# Patient Record
Sex: MALE | Race: WHITE | NOT HISPANIC OR LATINO | Employment: OTHER | URBAN - METROPOLITAN AREA
[De-identification: names, ages, dates, MRNs, and addresses within clinical notes are randomized per-mention and may not be internally consistent; named-entity substitution may affect disease eponyms.]

---

## 2020-08-15 ENCOUNTER — COMMUNICATION - HEALTHEAST (OUTPATIENT)
Dept: SCHEDULING | Facility: CLINIC | Age: 75
End: 2020-08-15

## 2020-10-13 ENCOUNTER — COMMUNICATION - HEALTHEAST (OUTPATIENT)
Dept: SCHEDULING | Facility: CLINIC | Age: 75
End: 2020-10-13

## 2021-01-31 ENCOUNTER — IMMUNIZATION (OUTPATIENT)
Dept: NURSING | Facility: CLINIC | Age: 76
End: 2021-01-31
Payer: COMMERCIAL

## 2021-01-31 PROCEDURE — 91300 PR COVID VAC PFIZER DIL RECON 30 MCG/0.3 ML IM: CPT

## 2021-01-31 PROCEDURE — 0001A PR COVID VAC PFIZER DIL RECON 30 MCG/0.3 ML IM: CPT

## 2021-02-21 ENCOUNTER — IMMUNIZATION (OUTPATIENT)
Dept: NURSING | Facility: CLINIC | Age: 76
End: 2021-02-21
Attending: FAMILY MEDICINE
Payer: COMMERCIAL

## 2021-02-21 PROCEDURE — 91300 PR COVID VAC PFIZER DIL RECON 30 MCG/0.3 ML IM: CPT

## 2021-02-21 PROCEDURE — 0002A PR COVID VAC PFIZER DIL RECON 30 MCG/0.3 ML IM: CPT

## 2021-03-07 ENCOUNTER — HEALTH MAINTENANCE LETTER (OUTPATIENT)
Age: 76
End: 2021-03-07

## 2021-06-16 PROBLEM — I10 HTN (HYPERTENSION): Status: ACTIVE | Noted: 2020-02-06

## 2021-06-16 PROBLEM — R09.02 HYPOXIA: Status: ACTIVE | Noted: 2020-08-13

## 2021-06-16 PROBLEM — F41.9 ANXIETY: Status: ACTIVE | Noted: 2020-02-06

## 2021-06-16 PROBLEM — E78.5 HYPERLIPIDEMIA: Status: ACTIVE | Noted: 2020-02-06

## 2021-06-16 PROBLEM — G47.9 SLEEP DISORDER: Status: ACTIVE | Noted: 2020-02-06

## 2021-10-11 ENCOUNTER — HEALTH MAINTENANCE LETTER (OUTPATIENT)
Age: 76
End: 2021-10-11

## 2022-03-27 ENCOUNTER — HEALTH MAINTENANCE LETTER (OUTPATIENT)
Age: 77
End: 2022-03-27

## 2022-09-02 ENCOUNTER — HOSPITAL ENCOUNTER (OUTPATIENT)
Facility: HOSPITAL | Age: 77
End: 2022-09-02
Attending: SURGERY | Admitting: SURGERY
Payer: COMMERCIAL

## 2022-09-02 ENCOUNTER — APPOINTMENT (OUTPATIENT)
Dept: ULTRASOUND IMAGING | Facility: HOSPITAL | Age: 77
DRG: 342 | End: 2022-09-02
Attending: FAMILY MEDICINE
Payer: COMMERCIAL

## 2022-09-02 ENCOUNTER — ANESTHESIA (OUTPATIENT)
Dept: SURGERY | Facility: HOSPITAL | Age: 77
DRG: 342 | End: 2022-09-02
Payer: COMMERCIAL

## 2022-09-02 ENCOUNTER — ANESTHESIA EVENT (OUTPATIENT)
Dept: SURGERY | Facility: HOSPITAL | Age: 77
DRG: 342 | End: 2022-09-02
Payer: COMMERCIAL

## 2022-09-02 ENCOUNTER — HOSPITAL ENCOUNTER (INPATIENT)
Facility: HOSPITAL | Age: 77
LOS: 1 days | Discharge: HOME | DRG: 342 | End: 2022-09-03
Attending: FAMILY MEDICINE | Admitting: SURGERY
Payer: COMMERCIAL

## 2022-09-02 ENCOUNTER — APPOINTMENT (OUTPATIENT)
Dept: RADIOLOGY | Facility: HOSPITAL | Age: 77
DRG: 342 | End: 2022-09-02
Attending: FAMILY MEDICINE
Payer: COMMERCIAL

## 2022-09-02 ENCOUNTER — APPOINTMENT (OUTPATIENT)
Dept: CT IMAGING | Facility: HOSPITAL | Age: 77
DRG: 342 | End: 2022-09-02
Attending: FAMILY MEDICINE
Payer: COMMERCIAL

## 2022-09-02 DIAGNOSIS — K35.30 ACUTE APPENDICITIS WITH LOCALIZED PERITONITIS, WITHOUT PERFORATION, ABSCESS, OR GANGRENE: ICD-10-CM

## 2022-09-02 DIAGNOSIS — I10 HYPERTENSION, UNSPECIFIED TYPE: Primary | ICD-10-CM

## 2022-09-02 DIAGNOSIS — R91.8 PULMONARY NODULES: ICD-10-CM

## 2022-09-02 PROBLEM — Z86.39 HISTORY OF DIABETES MELLITUS: Status: ACTIVE | Noted: 2022-09-02

## 2022-09-02 PROBLEM — E87.20 LACTIC ACIDEMIA: Status: ACTIVE | Noted: 2022-09-02

## 2022-09-02 PROBLEM — E87.1 HYPONATREMIA: Status: ACTIVE | Noted: 2022-09-02

## 2022-09-02 LAB
ALBUMIN SERPL-MCNC: 3.9 G/DL (ref 3.5–5)
ALBUMIN UR-MCNC: 20 MG/DL
ALP SERPL-CCNC: 49 U/L (ref 45–120)
ALT SERPL W P-5'-P-CCNC: 24 U/L (ref 0–45)
AMORPH CRY #/AREA URNS HPF: ABNORMAL /HPF
ANION GAP SERPL CALCULATED.3IONS-SCNC: 8 MMOL/L (ref 5–18)
APPEARANCE UR: CLEAR
AST SERPL W P-5'-P-CCNC: 21 U/L (ref 0–40)
BASOPHILS # BLD AUTO: 0.1 10E3/UL (ref 0–0.2)
BASOPHILS NFR BLD AUTO: 0 %
BILIRUB DIRECT SERPL-MCNC: 0.3 MG/DL
BILIRUB SERPL-MCNC: 1 MG/DL (ref 0–1)
BILIRUB UR QL STRIP: NEGATIVE
BUN SERPL-MCNC: 13 MG/DL (ref 8–28)
CALCIUM SERPL-MCNC: 10.6 MG/DL (ref 8.5–10.5)
CHLORIDE BLD-SCNC: 95 MMOL/L (ref 98–107)
CO2 SERPL-SCNC: 26 MMOL/L (ref 22–31)
COLOR UR AUTO: ABNORMAL
CREAT BLD-MCNC: 0.7 MG/DL (ref 0.7–1.3)
CREAT SERPL-MCNC: 0.9 MG/DL (ref 0.7–1.3)
EOSINOPHIL # BLD AUTO: 0 10E3/UL (ref 0–0.7)
EOSINOPHIL NFR BLD AUTO: 0 %
ERYTHROCYTE [DISTWIDTH] IN BLOOD BY AUTOMATED COUNT: 13.6 % (ref 10–15)
GFR SERPL CREATININE-BSD FRML MDRD: 87 ML/MIN/1.73M2
GFR SERPL CREATININE-BSD FRML MDRD: >60 ML/MIN/1.73M2
GLUCOSE BLD-MCNC: 154 MG/DL (ref 70–125)
GLUCOSE BLDC GLUCOMTR-MCNC: 160 MG/DL (ref 70–99)
GLUCOSE BLDC GLUCOMTR-MCNC: 190 MG/DL (ref 70–99)
GLUCOSE UR STRIP-MCNC: NEGATIVE MG/DL
HCT VFR BLD AUTO: 46.1 % (ref 40–53)
HGB BLD-MCNC: 15.8 G/DL (ref 13.3–17.7)
HGB UR QL STRIP: ABNORMAL
HOLD SPECIMEN: NORMAL
HOLD SPECIMEN: NORMAL
IMM GRANULOCYTES # BLD: 0.1 10E3/UL
IMM GRANULOCYTES NFR BLD: 1 %
KETONES UR STRIP-MCNC: NEGATIVE MG/DL
LACTATE SERPL-SCNC: 2.5 MMOL/L (ref 0.7–2)
LACTATE SERPL-SCNC: 2.5 MMOL/L (ref 0.7–2)
LEUKOCYTE ESTERASE UR QL STRIP: NEGATIVE
LIPASE SERPL-CCNC: 19 U/L (ref 0–52)
LYMPHOCYTES # BLD AUTO: 0.6 10E3/UL (ref 0.8–5.3)
LYMPHOCYTES NFR BLD AUTO: 4 %
MCH RBC QN AUTO: 30.4 PG (ref 26.5–33)
MCHC RBC AUTO-ENTMCNC: 34.3 G/DL (ref 31.5–36.5)
MCV RBC AUTO: 89 FL (ref 78–100)
MONOCYTES # BLD AUTO: 0.8 10E3/UL (ref 0–1.3)
MONOCYTES NFR BLD AUTO: 5 %
MUCOUS THREADS #/AREA URNS LPF: PRESENT /LPF
NEUTROPHILS # BLD AUTO: 15.2 10E3/UL (ref 1.6–8.3)
NEUTROPHILS NFR BLD AUTO: 90 %
NITRATE UR QL: NEGATIVE
NRBC # BLD AUTO: 0 10E3/UL
NRBC BLD AUTO-RTO: 0 /100
PH UR STRIP: 7 [PH] (ref 5–7)
PLATELET # BLD AUTO: 121 10E3/UL (ref 150–450)
PLATELET # BLD AUTO: 186 10E3/UL (ref 150–450)
POTASSIUM BLD-SCNC: 4.3 MMOL/L (ref 3.5–5)
PROT SERPL-MCNC: 7.2 G/DL (ref 6–8)
RBC # BLD AUTO: 5.2 10E6/UL (ref 4.4–5.9)
RBC URINE: 3 /HPF
SARS-COV-2 RNA RESP QL NAA+PROBE: NEGATIVE
SODIUM SERPL-SCNC: 129 MMOL/L (ref 136–145)
SP GR UR STRIP: 1.02 (ref 1–1.03)
UROBILINOGEN UR STRIP-MCNC: <2 MG/DL
WBC # BLD AUTO: 16.7 10E3/UL (ref 4–11)
WBC URINE: 1 /HPF

## 2022-09-02 PROCEDURE — 272N000001 HC OR GENERAL SUPPLY STERILE: Performed by: SURGERY

## 2022-09-02 PROCEDURE — 710N000009 HC RECOVERY PHASE 1, LEVEL 1, PER MIN: Performed by: SURGERY

## 2022-09-02 PROCEDURE — 250N000013 HC RX MED GY IP 250 OP 250 PS 637: Performed by: SURGERY

## 2022-09-02 PROCEDURE — 258N000003 HC RX IP 258 OP 636: Performed by: ANESTHESIOLOGY

## 2022-09-02 PROCEDURE — 83605 ASSAY OF LACTIC ACID: CPT | Performed by: FAMILY MEDICINE

## 2022-09-02 PROCEDURE — 96374 THER/PROPH/DIAG INJ IV PUSH: CPT | Mod: 59

## 2022-09-02 PROCEDURE — 120N000001 HC R&B MED SURG/OB

## 2022-09-02 PROCEDURE — 258N000003 HC RX IP 258 OP 636: Performed by: FAMILY MEDICINE

## 2022-09-02 PROCEDURE — 250N000009 HC RX 250: Performed by: NURSE ANESTHETIST, CERTIFIED REGISTERED

## 2022-09-02 PROCEDURE — 76705 ECHO EXAM OF ABDOMEN: CPT

## 2022-09-02 PROCEDURE — 83605 ASSAY OF LACTIC ACID: CPT | Performed by: INTERNAL MEDICINE

## 2022-09-02 PROCEDURE — 74177 CT ABD & PELVIS W/CONTRAST: CPT

## 2022-09-02 PROCEDURE — 99285 EMERGENCY DEPT VISIT HI MDM: CPT | Mod: 25

## 2022-09-02 PROCEDURE — 85049 AUTOMATED PLATELET COUNT: CPT | Performed by: SURGERY

## 2022-09-02 PROCEDURE — 250N000011 HC RX IP 250 OP 636: Performed by: SURGERY

## 2022-09-02 PROCEDURE — 82248 BILIRUBIN DIRECT: CPT | Performed by: FAMILY MEDICINE

## 2022-09-02 PROCEDURE — 250N000011 HC RX IP 250 OP 636: Performed by: NURSE ANESTHETIST, CERTIFIED REGISTERED

## 2022-09-02 PROCEDURE — 36415 COLL VENOUS BLD VENIPUNCTURE: CPT | Performed by: FAMILY MEDICINE

## 2022-09-02 PROCEDURE — 88304 TISSUE EXAM BY PATHOLOGIST: CPT | Mod: TC | Performed by: SURGERY

## 2022-09-02 PROCEDURE — 999N000141 HC STATISTIC PRE-PROCEDURE NURSING ASSESSMENT: Performed by: SURGERY

## 2022-09-02 PROCEDURE — C9803 HOPD COVID-19 SPEC COLLECT: HCPCS

## 2022-09-02 PROCEDURE — 93005 ELECTROCARDIOGRAM TRACING: CPT | Performed by: FAMILY MEDICINE

## 2022-09-02 PROCEDURE — 81001 URINALYSIS AUTO W/SCOPE: CPT | Performed by: FAMILY MEDICINE

## 2022-09-02 PROCEDURE — 85025 COMPLETE CBC W/AUTO DIFF WBC: CPT | Performed by: FAMILY MEDICINE

## 2022-09-02 PROCEDURE — 250N000013 HC RX MED GY IP 250 OP 250 PS 637: Performed by: INTERNAL MEDICINE

## 2022-09-02 PROCEDURE — 0DTJ4ZZ RESECTION OF APPENDIX, PERCUTANEOUS ENDOSCOPIC APPROACH: ICD-10-PCS | Performed by: SURGERY

## 2022-09-02 PROCEDURE — G0378 HOSPITAL OBSERVATION PER HR: HCPCS

## 2022-09-02 PROCEDURE — 250N000012 HC RX MED GY IP 250 OP 636 PS 637: Performed by: INTERNAL MEDICINE

## 2022-09-02 PROCEDURE — 82565 ASSAY OF CREATININE: CPT

## 2022-09-02 PROCEDURE — 250N000025 HC SEVOFLURANE, PER MIN: Performed by: SURGERY

## 2022-09-02 PROCEDURE — 258N000003 HC RX IP 258 OP 636: Performed by: NURSE ANESTHETIST, CERTIFIED REGISTERED

## 2022-09-02 PROCEDURE — 360N000076 HC SURGERY LEVEL 3, PER MIN: Performed by: SURGERY

## 2022-09-02 PROCEDURE — 82310 ASSAY OF CALCIUM: CPT | Performed by: FAMILY MEDICINE

## 2022-09-02 PROCEDURE — 250N000011 HC RX IP 250 OP 636: Performed by: FAMILY MEDICINE

## 2022-09-02 PROCEDURE — 250N000009 HC RX 250: Performed by: SURGERY

## 2022-09-02 PROCEDURE — U0003 INFECTIOUS AGENT DETECTION BY NUCLEIC ACID (DNA OR RNA); SEVERE ACUTE RESPIRATORY SYNDROME CORONAVIRUS 2 (SARS-COV-2) (CORONAVIRUS DISEASE [COVID-19]), AMPLIFIED PROBE TECHNIQUE, MAKING USE OF HIGH THROUGHPUT TECHNOLOGIES AS DESCRIBED BY CMS-2020-01-R: HCPCS | Performed by: FAMILY MEDICINE

## 2022-09-02 PROCEDURE — 36415 COLL VENOUS BLD VENIPUNCTURE: CPT | Performed by: SURGERY

## 2022-09-02 PROCEDURE — 258N000003 HC RX IP 258 OP 636: Performed by: INTERNAL MEDICINE

## 2022-09-02 PROCEDURE — 99222 1ST HOSP IP/OBS MODERATE 55: CPT | Mod: 57 | Performed by: SURGERY

## 2022-09-02 PROCEDURE — 370N000017 HC ANESTHESIA TECHNICAL FEE, PER MIN: Performed by: SURGERY

## 2022-09-02 PROCEDURE — 99233 SBSQ HOSP IP/OBS HIGH 50: CPT | Performed by: INTERNAL MEDICINE

## 2022-09-02 PROCEDURE — 44970 LAPAROSCOPY APPENDECTOMY: CPT | Performed by: SURGERY

## 2022-09-02 PROCEDURE — 71045 X-RAY EXAM CHEST 1 VIEW: CPT

## 2022-09-02 PROCEDURE — 83690 ASSAY OF LIPASE: CPT | Performed by: FAMILY MEDICINE

## 2022-09-02 RX ORDER — ONDANSETRON 2 MG/ML
INJECTION INTRAMUSCULAR; INTRAVENOUS PRN
Status: DISCONTINUED | OUTPATIENT
Start: 2022-09-02 | End: 2022-09-02

## 2022-09-02 RX ORDER — HEPARIN SODIUM 5000 [USP'U]/.5ML
5000 INJECTION, SOLUTION INTRAVENOUS; SUBCUTANEOUS EVERY 8 HOURS
Status: DISCONTINUED | OUTPATIENT
Start: 2022-09-03 | End: 2022-09-03 | Stop reason: HOSPADM

## 2022-09-02 RX ORDER — ASPIRIN/SOD BICARB/CITRIC ACID 324 MG
1 TABLET, EFFERVESCENT ORAL 2 TIMES DAILY PRN
Status: ON HOLD | COMMUNITY
End: 2023-06-21

## 2022-09-02 RX ORDER — ONDANSETRON 4 MG/1
4 TABLET, ORALLY DISINTEGRATING ORAL EVERY 30 MIN PRN
Status: DISCONTINUED | OUTPATIENT
Start: 2022-09-02 | End: 2022-09-02 | Stop reason: HOSPADM

## 2022-09-02 RX ORDER — DEXAMETHASONE SODIUM PHOSPHATE 4 MG/ML
INJECTION, SOLUTION INTRA-ARTICULAR; INTRALESIONAL; INTRAMUSCULAR; INTRAVENOUS; SOFT TISSUE PRN
Status: DISCONTINUED | OUTPATIENT
Start: 2022-09-02 | End: 2022-09-02

## 2022-09-02 RX ORDER — PIPERACILLIN SODIUM, TAZOBACTAM SODIUM 3; .375 G/15ML; G/15ML
3.38 INJECTION, POWDER, LYOPHILIZED, FOR SOLUTION INTRAVENOUS ONCE
Status: COMPLETED | OUTPATIENT
Start: 2022-09-02 | End: 2022-09-02

## 2022-09-02 RX ORDER — ACETAMINOPHEN 325 MG/1
975 TABLET ORAL ONCE
Status: COMPLETED | OUTPATIENT
Start: 2022-09-02 | End: 2022-09-02

## 2022-09-02 RX ORDER — IOPAMIDOL 755 MG/ML
100 INJECTION, SOLUTION INTRAVASCULAR ONCE
Status: COMPLETED | OUTPATIENT
Start: 2022-09-02 | End: 2022-09-02

## 2022-09-02 RX ORDER — FENTANYL CITRATE 50 UG/ML
25 INJECTION, SOLUTION INTRAMUSCULAR; INTRAVENOUS EVERY 5 MIN PRN
Status: DISCONTINUED | OUTPATIENT
Start: 2022-09-02 | End: 2022-09-02 | Stop reason: HOSPADM

## 2022-09-02 RX ORDER — POLYETHYLENE GLYCOL 3350 17 G/17G
17 POWDER, FOR SOLUTION ORAL DAILY
Status: DISCONTINUED | OUTPATIENT
Start: 2022-09-03 | End: 2022-09-03 | Stop reason: HOSPADM

## 2022-09-02 RX ORDER — CALCIUM CARBONATE 500 MG/1
500 TABLET, CHEWABLE ORAL 3 TIMES DAILY PRN
Status: DISCONTINUED | OUTPATIENT
Start: 2022-09-02 | End: 2022-09-03 | Stop reason: HOSPADM

## 2022-09-02 RX ORDER — CEFAZOLIN SODIUM/WATER 2 G/20 ML
2 SYRINGE (ML) INTRAVENOUS
Status: DISCONTINUED | OUTPATIENT
Start: 2022-09-02 | End: 2022-09-02 | Stop reason: HOSPADM

## 2022-09-02 RX ORDER — SODIUM CHLORIDE 9 MG/ML
INJECTION, SOLUTION INTRAVENOUS CONTINUOUS
Status: DISCONTINUED | OUTPATIENT
Start: 2022-09-02 | End: 2022-09-03

## 2022-09-02 RX ORDER — HYDRALAZINE HYDROCHLORIDE 20 MG/ML
10 INJECTION INTRAMUSCULAR; INTRAVENOUS EVERY 4 HOURS PRN
Status: DISCONTINUED | OUTPATIENT
Start: 2022-09-02 | End: 2022-09-03 | Stop reason: HOSPADM

## 2022-09-02 RX ORDER — FENTANYL CITRATE 50 UG/ML
25 INJECTION, SOLUTION INTRAMUSCULAR; INTRAVENOUS
Status: CANCELLED | OUTPATIENT
Start: 2022-09-02

## 2022-09-02 RX ORDER — BISACODYL 10 MG
10 SUPPOSITORY, RECTAL RECTAL DAILY PRN
Status: DISCONTINUED | OUTPATIENT
Start: 2022-09-02 | End: 2022-09-03 | Stop reason: HOSPADM

## 2022-09-02 RX ORDER — HYDROMORPHONE HYDROCHLORIDE 1 MG/ML
0.2 INJECTION, SOLUTION INTRAMUSCULAR; INTRAVENOUS; SUBCUTANEOUS EVERY 5 MIN PRN
Status: DISCONTINUED | OUTPATIENT
Start: 2022-09-02 | End: 2022-09-02 | Stop reason: HOSPADM

## 2022-09-02 RX ORDER — MEPERIDINE HYDROCHLORIDE 25 MG/ML
12.5 INJECTION INTRAMUSCULAR; INTRAVENOUS; SUBCUTANEOUS
Status: DISCONTINUED | OUTPATIENT
Start: 2022-09-02 | End: 2022-09-02 | Stop reason: HOSPADM

## 2022-09-02 RX ORDER — PIPERACILLIN SODIUM, TAZOBACTAM SODIUM 3; .375 G/15ML; G/15ML
3.38 INJECTION, POWDER, LYOPHILIZED, FOR SOLUTION INTRAVENOUS EVERY 8 HOURS
Status: DISCONTINUED | OUTPATIENT
Start: 2022-09-02 | End: 2022-09-03 | Stop reason: HOSPADM

## 2022-09-02 RX ORDER — ACETAMINOPHEN 325 MG/1
650 TABLET ORAL EVERY 4 HOURS PRN
Status: DISCONTINUED | OUTPATIENT
Start: 2022-09-05 | End: 2022-09-03 | Stop reason: HOSPADM

## 2022-09-02 RX ORDER — SODIUM CHLORIDE, SODIUM LACTATE, POTASSIUM CHLORIDE, CALCIUM CHLORIDE 600; 310; 30; 20 MG/100ML; MG/100ML; MG/100ML; MG/100ML
INJECTION, SOLUTION INTRAVENOUS CONTINUOUS
Status: DISCONTINUED | OUTPATIENT
Start: 2022-09-02 | End: 2022-09-02 | Stop reason: HOSPADM

## 2022-09-02 RX ORDER — NALOXONE HYDROCHLORIDE 0.4 MG/ML
0.2 INJECTION, SOLUTION INTRAMUSCULAR; INTRAVENOUS; SUBCUTANEOUS
Status: DISCONTINUED | OUTPATIENT
Start: 2022-09-02 | End: 2022-09-03 | Stop reason: HOSPADM

## 2022-09-02 RX ORDER — LANOLIN ALCOHOL/MO/W.PET/CERES
3 CREAM (GRAM) TOPICAL
Status: DISCONTINUED | OUTPATIENT
Start: 2022-09-02 | End: 2022-09-03 | Stop reason: HOSPADM

## 2022-09-02 RX ORDER — ONDANSETRON 2 MG/ML
4 INJECTION INTRAMUSCULAR; INTRAVENOUS EVERY 6 HOURS PRN
Status: DISCONTINUED | OUTPATIENT
Start: 2022-09-02 | End: 2022-09-03 | Stop reason: HOSPADM

## 2022-09-02 RX ORDER — NALOXONE HYDROCHLORIDE 0.4 MG/ML
0.4 INJECTION, SOLUTION INTRAMUSCULAR; INTRAVENOUS; SUBCUTANEOUS
Status: DISCONTINUED | OUTPATIENT
Start: 2022-09-02 | End: 2022-09-03 | Stop reason: HOSPADM

## 2022-09-02 RX ORDER — DIPHENHYDRAMINE HYDROCHLORIDE 50 MG/ML
12.5 INJECTION INTRAMUSCULAR; INTRAVENOUS EVERY 6 HOURS PRN
Status: DISCONTINUED | OUTPATIENT
Start: 2022-09-02 | End: 2022-09-03 | Stop reason: HOSPADM

## 2022-09-02 RX ORDER — DEXTROSE MONOHYDRATE 25 G/50ML
25-50 INJECTION, SOLUTION INTRAVENOUS
Status: DISCONTINUED | OUTPATIENT
Start: 2022-09-02 | End: 2022-09-03 | Stop reason: HOSPADM

## 2022-09-02 RX ORDER — BUPIVACAINE HYDROCHLORIDE AND EPINEPHRINE 2.5; 5 MG/ML; UG/ML
INJECTION, SOLUTION INFILTRATION; PERINEURAL PRN
Status: DISCONTINUED | OUTPATIENT
Start: 2022-09-02 | End: 2022-09-02 | Stop reason: HOSPADM

## 2022-09-02 RX ORDER — DIPHENHYDRAMINE HCL 12.5MG/5ML
12.5 LIQUID (ML) ORAL EVERY 6 HOURS PRN
Status: DISCONTINUED | OUTPATIENT
Start: 2022-09-02 | End: 2022-09-03 | Stop reason: HOSPADM

## 2022-09-02 RX ORDER — OXYCODONE HYDROCHLORIDE 5 MG/1
5 TABLET ORAL EVERY 4 HOURS PRN
Status: DISCONTINUED | OUTPATIENT
Start: 2022-09-02 | End: 2022-09-02 | Stop reason: HOSPADM

## 2022-09-02 RX ORDER — FAMOTIDINE 20 MG/1
20 TABLET, FILM COATED ORAL EVERY 12 HOURS
Status: DISCONTINUED | OUTPATIENT
Start: 2022-09-02 | End: 2022-09-03 | Stop reason: HOSPADM

## 2022-09-02 RX ORDER — LIDOCAINE HYDROCHLORIDE 10 MG/ML
INJECTION, SOLUTION INFILTRATION; PERINEURAL PRN
Status: DISCONTINUED | OUTPATIENT
Start: 2022-09-02 | End: 2022-09-02

## 2022-09-02 RX ORDER — NICOTINE POLACRILEX 4 MG
15-30 LOZENGE BUCCAL
Status: DISCONTINUED | OUTPATIENT
Start: 2022-09-02 | End: 2022-09-03 | Stop reason: HOSPADM

## 2022-09-02 RX ORDER — ONDANSETRON 2 MG/ML
4 INJECTION INTRAMUSCULAR; INTRAVENOUS EVERY 30 MIN PRN
Status: DISCONTINUED | OUTPATIENT
Start: 2022-09-02 | End: 2022-09-02 | Stop reason: HOSPADM

## 2022-09-02 RX ORDER — ONDANSETRON 4 MG/1
4 TABLET, ORALLY DISINTEGRATING ORAL EVERY 6 HOURS PRN
Status: DISCONTINUED | OUTPATIENT
Start: 2022-09-02 | End: 2022-09-03 | Stop reason: HOSPADM

## 2022-09-02 RX ORDER — HYDROMORPHONE HYDROCHLORIDE 1 MG/ML
.5-1 INJECTION, SOLUTION INTRAMUSCULAR; INTRAVENOUS; SUBCUTANEOUS
Status: DISCONTINUED | OUTPATIENT
Start: 2022-09-02 | End: 2022-09-03 | Stop reason: HOSPADM

## 2022-09-02 RX ORDER — DEXTROSE MONOHYDRATE, SODIUM CHLORIDE, AND POTASSIUM CHLORIDE 50; 1.49; 4.5 G/1000ML; G/1000ML; G/1000ML
INJECTION, SOLUTION INTRAVENOUS CONTINUOUS
Status: DISCONTINUED | OUTPATIENT
Start: 2022-09-02 | End: 2022-09-02

## 2022-09-02 RX ORDER — TRAZODONE HYDROCHLORIDE 50 MG/1
50 TABLET, FILM COATED ORAL AT BEDTIME
Status: DISCONTINUED | OUTPATIENT
Start: 2022-09-02 | End: 2022-09-03 | Stop reason: HOSPADM

## 2022-09-02 RX ORDER — TRAMADOL HYDROCHLORIDE 50 MG/1
50 TABLET ORAL EVERY 6 HOURS PRN
Status: DISCONTINUED | OUTPATIENT
Start: 2022-09-02 | End: 2022-09-03 | Stop reason: HOSPADM

## 2022-09-02 RX ORDER — FENTANYL CITRATE 50 UG/ML
INJECTION, SOLUTION INTRAMUSCULAR; INTRAVENOUS PRN
Status: DISCONTINUED | OUTPATIENT
Start: 2022-09-02 | End: 2022-09-02

## 2022-09-02 RX ORDER — PROPOFOL 10 MG/ML
INJECTION, EMULSION INTRAVENOUS PRN
Status: DISCONTINUED | OUTPATIENT
Start: 2022-09-02 | End: 2022-09-02

## 2022-09-02 RX ORDER — HYDROMORPHONE HCL IN WATER/PF 6 MG/30 ML
0.4 PATIENT CONTROLLED ANALGESIA SYRINGE INTRAVENOUS
Status: DISCONTINUED | OUTPATIENT
Start: 2022-09-02 | End: 2022-09-03 | Stop reason: HOSPADM

## 2022-09-02 RX ORDER — ACETAMINOPHEN 325 MG/1
975 TABLET ORAL EVERY 8 HOURS
Status: DISCONTINUED | OUTPATIENT
Start: 2022-09-02 | End: 2022-09-03 | Stop reason: HOSPADM

## 2022-09-02 RX ORDER — LIDOCAINE 40 MG/G
CREAM TOPICAL
Status: DISCONTINUED | OUTPATIENT
Start: 2022-09-02 | End: 2022-09-02 | Stop reason: HOSPADM

## 2022-09-02 RX ORDER — CEFAZOLIN SODIUM/WATER 2 G/20 ML
2 SYRINGE (ML) INTRAVENOUS SEE ADMIN INSTRUCTIONS
Status: DISCONTINUED | OUTPATIENT
Start: 2022-09-02 | End: 2022-09-02 | Stop reason: HOSPADM

## 2022-09-02 RX ORDER — AMOXICILLIN 250 MG
1 CAPSULE ORAL 2 TIMES DAILY
Status: DISCONTINUED | OUTPATIENT
Start: 2022-09-02 | End: 2022-09-03 | Stop reason: HOSPADM

## 2022-09-02 RX ORDER — PROCHLORPERAZINE MALEATE 5 MG
5 TABLET ORAL EVERY 6 HOURS PRN
Status: DISCONTINUED | OUTPATIENT
Start: 2022-09-02 | End: 2022-09-03 | Stop reason: HOSPADM

## 2022-09-02 RX ADMIN — SODIUM CHLORIDE, POTASSIUM CHLORIDE, SODIUM LACTATE AND CALCIUM CHLORIDE: 600; 310; 30; 20 INJECTION, SOLUTION INTRAVENOUS at 13:37

## 2022-09-02 RX ADMIN — ACETAMINOPHEN 975 MG: 325 TABLET ORAL at 20:47

## 2022-09-02 RX ADMIN — PHENYLEPHRINE HYDROCHLORIDE 100 MCG: 10 INJECTION INTRAVENOUS at 14:51

## 2022-09-02 RX ADMIN — ACETAMINOPHEN 975 MG: 325 TABLET ORAL at 13:38

## 2022-09-02 RX ADMIN — TRAZODONE HYDROCHLORIDE 50 MG: 50 TABLET ORAL at 20:47

## 2022-09-02 RX ADMIN — SUGAMMADEX 400 MG: 100 INJECTION, SOLUTION INTRAVENOUS at 15:18

## 2022-09-02 RX ADMIN — PIPERACILLIN AND TAZOBACTAM 3.38 G: 3; .375 INJECTION, POWDER, LYOPHILIZED, FOR SOLUTION INTRAVENOUS at 12:14

## 2022-09-02 RX ADMIN — PROPOFOL 200 MG: 10 INJECTION, EMULSION INTRAVENOUS at 14:13

## 2022-09-02 RX ADMIN — ROCURONIUM BROMIDE 50 MG: 50 INJECTION, SOLUTION INTRAVENOUS at 14:13

## 2022-09-02 RX ADMIN — FENTANYL CITRATE 100 MCG: 50 INJECTION, SOLUTION INTRAMUSCULAR; INTRAVENOUS at 14:13

## 2022-09-02 RX ADMIN — SODIUM CHLORIDE, POTASSIUM CHLORIDE, SODIUM LACTATE AND CALCIUM CHLORIDE 1000 ML: 600; 310; 30; 20 INJECTION, SOLUTION INTRAVENOUS at 12:08

## 2022-09-02 RX ADMIN — LIDOCAINE HYDROCHLORIDE 5 ML: 10 INJECTION, SOLUTION INFILTRATION; PERINEURAL at 14:13

## 2022-09-02 RX ADMIN — DOCUSATE SODIUM 50 MG AND SENNOSIDES 8.6 MG 1 TABLET: 8.6; 5 TABLET, FILM COATED ORAL at 20:47

## 2022-09-02 RX ADMIN — ONDANSETRON 4 MG: 2 INJECTION INTRAMUSCULAR; INTRAVENOUS at 14:44

## 2022-09-02 RX ADMIN — FAMOTIDINE 20 MG: 20 TABLET ORAL at 20:47

## 2022-09-02 RX ADMIN — INSULIN ASPART 2 UNITS: 100 INJECTION, SOLUTION INTRAVENOUS; SUBCUTANEOUS at 18:13

## 2022-09-02 RX ADMIN — SODIUM CHLORIDE, PRESERVATIVE FREE: 5 INJECTION INTRAVENOUS at 18:07

## 2022-09-02 RX ADMIN — HYDROMORPHONE HYDROCHLORIDE 0.5 MG: 1 INJECTION, SOLUTION INTRAMUSCULAR; INTRAVENOUS; SUBCUTANEOUS at 14:33

## 2022-09-02 RX ADMIN — IOPAMIDOL 100 ML: 755 INJECTION, SOLUTION INTRAVENOUS at 10:36

## 2022-09-02 RX ADMIN — DEXAMETHASONE SODIUM PHOSPHATE 4 MG: 4 INJECTION, SOLUTION INTRA-ARTICULAR; INTRALESIONAL; INTRAMUSCULAR; INTRAVENOUS; SOFT TISSUE at 14:13

## 2022-09-02 RX ADMIN — PIPERACILLIN AND TAZOBACTAM 3.38 G: 3; .375 INJECTION, POWDER, LYOPHILIZED, FOR SOLUTION INTRAVENOUS at 18:07

## 2022-09-02 ASSESSMENT — ACTIVITIES OF DAILY LIVING (ADL)
ADLS_ACUITY_SCORE: 35

## 2022-09-02 ASSESSMENT — ENCOUNTER SYMPTOMS
VOMITING: 1
ABDOMINAL PAIN: 1
SHORTNESS OF BREATH: 0
NAUSEA: 0

## 2022-09-02 NOTE — ANESTHESIA PREPROCEDURE EVALUATION
Anesthesia Pre-Procedure Evaluation    Patient: Kimani Glover   MRN: [id number] : 1945        Procedure : Procedure(s):  APPENDECTOMY, LAPAROSCOPIC          History reviewed. No pertinent past medical history.   History reviewed. No pertinent surgical history.   No Known Allergies   Social History     Tobacco Use     Smoking status: Never Smoker     Smokeless tobacco: Never Used   Substance Use Topics     Alcohol use: Yes     Alcohol/week: 15.0 standard drinks      Wt Readings from Last 1 Encounters:   22 106.6 kg (235 lb)        Anesthesia Evaluation            ROS/MED HX  ENT/Pulmonary:  - neg pulmonary ROS  (-) sleep apnea   Neurologic:  - neg neurologic ROS     Cardiovascular:     (+) Dyslipidemia hypertension-----    METS/Exercise Tolerance:     Hematologic:  - neg hematologic  ROS     Musculoskeletal:   (+) arthritis,     GI/Hepatic:  - neg GI/hepatic ROS     Renal/Genitourinary:  - neg Renal ROS     Endo:     (+) Obesity,     Psychiatric/Substance Use:     (+) psychiatric history anxiety     Infectious Disease:  - neg infectious disease ROS     Malignancy:       Other:            Physical Exam    Airway        Mallampati: III   TM distance: < 3 FB   Neck ROM: full   Mouth opening: > 3 cm    Respiratory Devices and Support         Dental     Comment: Very poor dentition.  Lower left canine very loose.    (+) loose, missing and chipped      Cardiovascular   cardiovascular exam normal          Pulmonary   pulmonary exam normal                OUTSIDE LABS:  CBC:   Lab Results   Component Value Date    WBC 16.7 (H) 2022    WBC 7.1 08/15/2020    HGB 15.8 2022    HGB 13.5 (L) 08/15/2020    HCT 46.1 2022    HCT 40.0 08/15/2020     2022     (L) 08/15/2020     BMP:   Lab Results   Component Value Date     (L) 2022     2020    POTASSIUM 4.3 2022    POTASSIUM 4.1 2020    CHLORIDE 95 (L) 2022    CHLORIDE 107 2020    CO2  26 09/02/2022    CO2 24 08/14/2020    BUN 13 09/02/2022    BUN 17 08/14/2020    CR 0.7 09/02/2022    CR 0.90 09/02/2022     (H) 09/02/2022     08/15/2020     COAGS: No results found for: PTT, INR, FIBR  POC: No results found for: BGM, HCG, HCGS  HEPATIC:   Lab Results   Component Value Date    ALBUMIN 3.9 09/02/2022    PROTTOTAL 7.2 09/02/2022    ALT 24 09/02/2022    AST 21 09/02/2022    ALKPHOS 49 09/02/2022    BILITOTAL 1.0 09/02/2022     OTHER:   Lab Results   Component Value Date    LACT 2.5 (H) 09/02/2022    A1C 5.3 08/14/2020    SUSAN 10.6 (H) 09/02/2022    LIPASE 19 09/02/2022       Anesthesia Plan    ASA Status:  3   NPO Status:  NPO Appropriate    Anesthesia Type: General.     - Airway: ETT   Induction: Intravenous, RSI, Propofol.   Maintenance: Balanced.        Consents    Anesthesia Plan(s) and associated risks, benefits, and realistic alternatives discussed. Questions answered and patient/representative(s) expressed understanding.     - Discussed: Risks, Benefits and Alternatives for BOTH SEDATION and the PROCEDURE were discussed     - Discussed with:  Patient      - Extended Intubation/Ventilatory Support Discussed: No.      - Patient is DNR/DNI Status: No    Use of blood products discussed: No .     Postoperative Care    Pain management: IV analgesics, Multi-modal analgesia.   PONV prophylaxis: Ondansetron (or other 5HT-3), Dexamethasone or Solumedrol     Comments:    Other Comments: GETA (glidescope)    RSI with succ    Ponv ppx            Hussain Lerma MD

## 2022-09-02 NOTE — ED PROVIDER NOTES
EMERGENCY DEPARTMENT ENCOUNTER      NAME: Kimani Glover  AGE: 76 year old male  YOB: 1945  MRN: [id number]  EVALUATION DATE & TIME: No admission date for patient encounter.    PCP: Anibal Chamorro    ED PROVIDER: Galindo Blackwood M.D.    Chief Complaint   Patient presents with     Abdominal Pain       FINAL IMPRESSION:  1. Acute appendicitis with localized peritonitis, without perforation, abscess, or gangrene    2. Pulmonary nodules      ED COURSE & MEDICAL DECISION MAKING:    Pertinent Labs & Imaging studies personally reviewed and interpreted by me. (See chart for details)    8:30 AM Patient seen and examined, prior records reviewed.  Patient was initially seen and history performed by medical student.  I met with the patient to gather history and to perform my initial exam. I discussed the plan for care while in the Emergency Department. PPE (gloves and surgical mask) was worn during patient encounters.  Differential diagnosis includes but not limited to gastritis, cholecystitis, pancreatitis, colitis, enteritis, diverticulitis, urinary tract infection, myocardial infarction, pneumonia appendicitis, AAA, cholelithiasis, ischemic bowel.  Patient presents with right upper quadrant and epigastric abdominal pain since last night.  On exam significant right upper quadrant tenderness and some epigastric tenderness.  Bedside ultrasound does not demonstrate any pericholecystic fluid or gallbladder wall thickening although the common bile duct appears mildly distended, there is no intrahepatic ductal dilatation.  Labs and ultrasound are ordered.  9:45 AM delay in care due to lab not starting testing.  Called lab.  10:24 AM I updated patient on results.  Leukocytosis, otherwise lipase and LFTs are normal and US negative for acute findings to explain pain.    11:07 AM CT scan personally reviewed and interpreted by me appears to show findings consistent with acute appendicitis.  Radiology interpretation  agrees, no perforation.  Zosyn is ordered and patient will be discussed with general surgery with plan for appendectomy.  11:51 AM I spoke with Dr. Carmona of General Surgery regarding patient.  Patient will likely need to be admitted postoperatively as procedure will not be done until later this afternoon.  Bed request is made.    At the conclusion of the encounter I discussed the results of all of the tests and the disposition. The questions were answered. The patient or family acknowledged understanding and was agreeable with the care plan.     PROCEDURES:   Procedures    MEDICATIONS GIVEN IN THE EMERGENCY:  Medications   lactated ringers BOLUS 1,000 mL (1,000 mLs Intravenous New Bag 9/2/22 1208)   iopamidol (ISOVUE-370) solution 100 mL (100 mLs Intravenous Given 9/2/22 1036)   piperacillin-tazobactam (ZOSYN) 3.375 g vial to attach to  mL bag (3.375 g Intravenous Given 9/2/22 1214)       NEW PRESCRIPTIONS STARTED AT TODAY'S ER VISIT  New Prescriptions    No medications on file       =================================================================    HPI    Patient information was obtained from: Patient      Kimani Glover is a 76 year old male with a pertinent history of anxiety, hyperlipidemia, and hypertension who presents to this ED via private vehicle for evaluation of abdominal pain. Patient states he has had RUQ abdominal pain since 1600 (~16 hours ago) following dinner yesterday. Patient has not eaten since. Pain is exacerbated with movement and has been constant since onset. He notes that he had a similar episode ~3 weeks ago that was intermittent but resolved on its own. Patient states that he vomited on 9/1 (~1 day ago) but not toady and he was dry heaving this morning. Patient denies urinary/bowel symptoms, nausea, chest pain, and shortness of breath. He has no history of abdominal surgery. Patient denies additional medical concerns or complaints at this time.       REVIEW OF SYSTEMS   Review  "of Systems   Respiratory: Negative for shortness of breath.    Cardiovascular: Negative for chest pain.   Gastrointestinal: Positive for abdominal pain (RUQ) and vomiting. Negative for nausea.   Genitourinary: Negative.    All other systems reviewed and are negative.      PAST MEDICAL HISTORY:  History reviewed. No pertinent past medical history.    PAST SURGICAL HISTORY:  History reviewed. No pertinent surgical history.    CURRENT MEDICATIONS:    Current Facility-Administered Medications   Medication     lactated ringers BOLUS 1,000 mL     Current Outpatient Medications   Medication     aspirin effervescent (HEIDE-SELTZER) 325 MG TBEF tablet     losartan-hydrochlorothiazide (HYZAAR) 100-12.5 mg per tablet     Multiple Vitamins-Minerals (MENS 50+ MULTI VITAMIN/MIN PO)     pravastatin (PRAVACHOL) 10 MG tablet     traZODone (DESYREL) 50 MG tablet     benzocaine-menthoL (CEPACOL) 15-3.6 mg     fish oil-omega-3 fatty acids (FISH OIL) 300-1,000 mg capsule     \  ALLERGIES:  No Known Allergies    FAMILY HISTORY:  History reviewed. No pertinent family history.    SOCIAL HISTORY:   Social History     Socioeconomic History     Marital status: Single   Tobacco Use     Smoking status: Never Smoker     Smokeless tobacco: Never Used   Substance and Sexual Activity     Alcohol use: Yes     Alcohol/week: 15.0 standard drinks     Drug use: Never     Sexual activity: Yes     Partners: Female     Birth control/protection: Condom       VITALS:  /68   Pulse 97   Temp 99.5  F (37.5  C) (Oral)   Resp 24   Ht 1.778 m (5' 10\")   Wt 106.6 kg (235 lb)   SpO2 100%   BMI 33.72 kg/m      PHYSICAL EXAM:  Physical Exam  Vitals and nursing note reviewed.   Constitutional:       Appearance: Normal appearance.   HENT:      Head: Normocephalic and atraumatic.      Right Ear: External ear normal.      Left Ear: External ear normal.      Nose: Nose normal.      Mouth/Throat:      Mouth: Mucous membranes are moist.   Eyes:      Extraocular " Movements: Extraocular movements intact.      Conjunctiva/sclera: Conjunctivae normal.      Pupils: Pupils are equal, round, and reactive to light.   Cardiovascular:      Rate and Rhythm: Normal rate and regular rhythm.   Pulmonary:      Effort: Pulmonary effort is normal.      Breath sounds: Normal breath sounds. No wheezing or rales.   Abdominal:      General: Abdomen is flat. There is distension.      Palpations: Abdomen is soft.      Tenderness: There is abdominal tenderness in the right upper quadrant. There is guarding and rebound. Positive signs include Starkey's sign.   Musculoskeletal:         General: Normal range of motion.      Cervical back: Normal range of motion and neck supple.      Right lower leg: No edema.      Left lower leg: No edema.   Lymphadenopathy:      Cervical: No cervical adenopathy.   Skin:     General: Skin is warm and dry.   Neurological:      General: No focal deficit present.      Mental Status: He is alert and oriented to person, place, and time. Mental status is at baseline.      Comments: No gross focal neurologic deficits   Psychiatric:         Mood and Affect: Mood normal.         Behavior: Behavior normal.         Thought Content: Thought content normal.          LAB:  All pertinent labs reviewed and interpreted.  Results for orders placed or performed during the hospital encounter of 09/02/22   US Abdomen Limited (RUQ)    Impression    IMPRESSION:  1.  Normal limited abdominal ultrasound.       CT Abdomen Pelvis w Contrast    Impression    IMPRESSION:   1.  Uncomplicated acute appendicitis right lower quadrant .  2.  Partially visualized smoothly marginated 10 mm right middle lobe nodule has developed and is indeterminate. Recommend CT chest.  3.  Moderate diffuse hepatic steatosis.    Extra Green Top (Lithium Heparin) Tube   Result Value Ref Range    Hold Specimen JIC    Extra Purple Top Tube   Result Value Ref Range    Hold Specimen JIC    Result Value Ref Range    Lipase 19  0 - 52 U/L   Basic metabolic panel   Result Value Ref Range    Sodium 129 (L) 136 - 145 mmol/L    Potassium 4.3 3.5 - 5.0 mmol/L    Chloride 95 (L) 98 - 107 mmol/L    Carbon Dioxide (CO2) 26 22 - 31 mmol/L    Anion Gap 8 5 - 18 mmol/L    Urea Nitrogen 13 8 - 28 mg/dL    Creatinine 0.90 0.70 - 1.30 mg/dL    Calcium 10.6 (H) 8.5 - 10.5 mg/dL    Glucose 154 (H) 70 - 125 mg/dL    GFR Estimate 87 >60 mL/min/1.73m2   Hepatic function panel   Result Value Ref Range    Bilirubin Total 1.0 0.0 - 1.0 mg/dL    Bilirubin Direct 0.3 <=0.5 mg/dL    Protein Total 7.2 6.0 - 8.0 g/dL    Albumin 3.9 3.5 - 5.0 g/dL    Alkaline Phosphatase 49 45 - 120 U/L    AST 21 0 - 40 U/L    ALT 24 0 - 45 U/L   UA with Microscopic reflex to Culture    Specimen: Urine, Clean Catch   Result Value Ref Range    Color Urine Orange (A) Colorless, Straw, Light Yellow, Yellow    Appearance Urine Clear Clear    Glucose Urine Negative Negative mg/dL    Bilirubin Urine Negative Negative    Ketones Urine Negative Negative mg/dL    Specific Gravity Urine 1.025 1.001 - 1.030    Blood Urine 0.03 mg/dL (A) Negative    pH Urine 7.0 5.0 - 7.0    Protein Albumin Urine 20  (A) Negative mg/dL    Urobilinogen Urine <2.0 <2.0 mg/dL    Nitrite Urine Negative Negative    Leukocyte Esterase Urine Negative Negative    Mucus Urine Present (A) None Seen /LPF    Amorphous Crystals Urine Few (A) None Seen /HPF    RBC Urine 3 (H) <=2 /HPF    WBC Urine 1 <=5 /HPF   CBC with platelets and differential   Result Value Ref Range    WBC Count 16.7 (H) 4.0 - 11.0 10e3/uL    RBC Count 5.20 4.40 - 5.90 10e6/uL    Hemoglobin 15.8 13.3 - 17.7 g/dL    Hematocrit 46.1 40.0 - 53.0 %    MCV 89 78 - 100 fL    MCH 30.4 26.5 - 33.0 pg    MCHC 34.3 31.5 - 36.5 g/dL    RDW 13.6 10.0 - 15.0 %    Platelet Count 186 150 - 450 10e3/uL    % Neutrophils 90 %    % Lymphocytes 4 %    % Monocytes 5 %    % Eosinophils 0 %    % Basophils 0 %    % Immature Granulocytes 1 %    NRBCs per 100 WBC 0 <1 /100     Absolute Neutrophils 15.2 (H) 1.6 - 8.3 10e3/uL    Absolute Lymphocytes 0.6 (L) 0.8 - 5.3 10e3/uL    Absolute Monocytes 0.8 0.0 - 1.3 10e3/uL    Absolute Eosinophils 0.0 0.0 - 0.7 10e3/uL    Absolute Basophils 0.1 0.0 - 0.2 10e3/uL    Absolute Immature Granulocytes 0.1 <=0.4 10e3/uL    Absolute NRBCs 0.0 10e3/uL   Lactic acid whole blood   Result Value Ref Range    Lactic Acid 2.5 (H) 0.7 - 2.0 mmol/L   Creatinine POCT   Result Value Ref Range    Creatinine POCT 0.7 0.7 - 1.3 mg/dL    GFR, ESTIMATED POCT >60 >60 mL/min/1.73m2     RADIOLOGY:  Reviewed all pertinent imaging. Please see official radiology report.  CT Abdomen Pelvis w Contrast   Final Result   IMPRESSION:    1.  Uncomplicated acute appendicitis right lower quadrant .   2.  Partially visualized smoothly marginated 10 mm right middle lobe nodule has developed and is indeterminate. Recommend CT chest.   3.  Moderate diffuse hepatic steatosis.       US Abdomen Limited (RUQ)   Final Result   IMPRESSION:   1.  Normal limited abdominal ultrasound.            XR Chest Port 1 View    (Results Pending)     EKG:    EKG personally reviewed and interpreted by me performed at 12:40 PM demonstrates sinus rhythm with first-degree AV block rate 96, , QTc 424, no acute ischemic changes, normal axis.  Compared to prior of August 2020, no acute changes are seen.    I, Maida Valera, am serving as a scribe to document services personally performed by Dr. Blackwood based on my observation and the provider's statements to me. I, Galindo Blackwood MD attest that Maida Valera is acting in a scribe capacity, has observed my performance of the services and has documented them in accordance with my direction.    Galindo Blackwood M.D.  Emergency Medicine  [facility] EMERGENCY DEPARTMENT  [facility] [facility] [id number] [phone]  Dept: [phone]     Galnido Blackwood MD  09/02/22 9480

## 2022-09-02 NOTE — CONSULTS
General surgery consult         ASSESSMENT     1. Acute appendicitis with localized peritonitis, without perforation, abscess, or gangrene             PLAN      Laparoscopic appendectomy         CHIEF COMPLAINT     Chief Complaint   Patient presents with     Abdominal Pain         HPI     Kimani Glover is a 76 year old male who presents to [facility] emergency department with right upper quadrant pain that started 16 hours prior to his arrival at 4 PM yesterday after eating.  The pain is exacerbated with movement.  He has had nausea and vomiting over the course of the last day.  He denies any chest pain denies any shortness of breath.  He has no history of prior abdominal surgery.  The patient was evaluated the CT scan found to have evidence of an acute appendicitis.         PAST MEDICAL HISTORY SURGICAL HISTORY     History reviewed. No pertinent past medical history. History reviewed. No pertinent surgical history.       CURRENT MEDICATIONS ALLERGIES     Current Outpatient Rx   Medication Sig Dispense Refill     benzocaine-menthoL (CEPACOL) 15-3.6 mg [BENZOCAINE-MENTHOL (CEPACOL) 15-3.6 MG] Take 1 lozenge by mouth every hour as needed.  0     fish oil-omega-3 fatty acids (FISH OIL) 300-1,000 mg capsule [FISH OIL-OMEGA-3 FATTY ACIDS (FISH OIL) 300-1,000 MG CAPSULE] Take 1 g by mouth daily.       FLUoxetine (PROZAC) 10 MG tablet [FLUOXETINE (PROZAC) 10 MG TABLET] Take 10 mg by mouth daily.       losartan-hydrochlorothiazide (HYZAAR) 100-12.5 mg per tablet [LOSARTAN-HYDROCHLOROTHIAZIDE (HYZAAR) 100-12.5 MG PER TABLET] Take 1 tablet by mouth daily.       multivitamin with minerals (THERA-M) 9 mg iron-400 mcg Tab tablet [MULTIVITAMIN WITH MINERALS (THERA-M) 9 MG IRON-400 MCG TAB TABLET] Take 1 tablet by mouth daily.       pravastatin (PRAVACHOL) 10 MG tablet [PRAVASTATIN (PRAVACHOL) 10 MG TABLET] Take 10 mg by mouth at bedtime.       traZODone (DESYREL) 50 MG tablet [TRAZODONE (DESYREL) 50 MG TABLET] Take 50 mg by  "mouth at bedtime.      No Known Allergies       FAMILY HISTORY   History reviewed. No pertinent family history.      SOCIAL HISTORY     Social History     Socioeconomic History     Marital status: Single     Spouse name: None     Number of children: None     Years of education: None     Highest education level: None   Tobacco Use     Smoking status: Never Smoker     Smokeless tobacco: Never Used   Substance and Sexual Activity     Alcohol use: Yes     Alcohol/week: 15.0 standard drinks     Drug use: Never     Sexual activity: Yes     Partners: Female     Birth control/protection: Condom         REVIEW OF SYSTEMS       12 point review of systems are unremarkable except for the symptoms described in the HPI    PHYSICAL EXAM     VITAL SIGNS: BP (!) 142/72   Pulse 95   Temp 99.4  F (37.4  C) (Oral)   Resp 16   Ht 1.778 m (5' 10\")   Wt 106.6 kg (235 lb)   SpO2 97%   BMI 33.72 kg/m     General : Alert, cooperative, appears stated age   Skin: Skin color, texture, turgor normal, no rashes or lesions   Lymph nodes: No obvious adenopathy   Head: Normocephalic, without obvious abnormality, atraumatic   HEENT:  conjunctiva/corneas clear, EOM's intact, no scleral icterus   Throat: Lips, mucosa, and tongue normal; teeth and gums normal    Neck: Supple, thyroid not enlarged   Back: No CVA tenderness   Lungs: Clear to auscultation bilaterally, respirations unlabored, no rales, rhonchi or wheezes   Chest Wall:No tenderness or deformity   Heart: Regular rate and rhythm, S1, S2 normal, no murmur, rub or gallop   Abdomen: Soft, tender to palpation in the right lower quadrant no guarding, + bowel sounds active,   Extremities : No obvious swelling,  Neurologic: Cranial Nerves II-XII normal, moves all extremities equally, no focal findings          RADIOLOGY      CT Abdomen Pelvis w Contrast   Final Result   IMPRESSION:    1.  Uncomplicated acute appendicitis right lower quadrant .   2.  Partially visualized smoothly marginated 10 " mm right middle lobe nodule has developed and is indeterminate. Recommend CT chest.   3.  Moderate diffuse hepatic steatosis.       US Abdomen Limited (RUQ)   Final Result   IMPRESSION:   1.  Normal limited abdominal ultrasound.            XR Chest Port 1 View    (Results Pending)       EKG     Reviewed        LABS     Results for orders placed or performed during the hospital encounter of 09/02/22   US Abdomen Limited (RUQ)    Impression    IMPRESSION:  1.  Normal limited abdominal ultrasound.       CT Abdomen Pelvis w Contrast    Impression    IMPRESSION:   1.  Uncomplicated acute appendicitis right lower quadrant .  2.  Partially visualized smoothly marginated 10 mm right middle lobe nodule has developed and is indeterminate. Recommend CT chest.  3.  Moderate diffuse hepatic steatosis.    Extra Green Top (Lithium Heparin) Tube   Result Value Ref Range    Hold Specimen JIC    Extra Purple Top Tube   Result Value Ref Range    Hold Specimen JIC    Result Value Ref Range    Lipase 19 0 - 52 U/L   Basic metabolic panel   Result Value Ref Range    Sodium 129 (L) 136 - 145 mmol/L    Potassium 4.3 3.5 - 5.0 mmol/L    Chloride 95 (L) 98 - 107 mmol/L    Carbon Dioxide (CO2) 26 22 - 31 mmol/L    Anion Gap 8 5 - 18 mmol/L    Urea Nitrogen 13 8 - 28 mg/dL    Creatinine 0.90 0.70 - 1.30 mg/dL    Calcium 10.6 (H) 8.5 - 10.5 mg/dL    Glucose 154 (H) 70 - 125 mg/dL    GFR Estimate 87 >60 mL/min/1.73m2   Hepatic function panel   Result Value Ref Range    Bilirubin Total 1.0 0.0 - 1.0 mg/dL    Bilirubin Direct 0.3 <=0.5 mg/dL    Protein Total 7.2 6.0 - 8.0 g/dL    Albumin 3.9 3.5 - 5.0 g/dL    Alkaline Phosphatase 49 45 - 120 U/L    AST 21 0 - 40 U/L    ALT 24 0 - 45 U/L   UA with Microscopic reflex to Culture    Specimen: Urine, Clean Catch   Result Value Ref Range    Color Urine Orange (A) Colorless, Straw, Light Yellow, Yellow    Appearance Urine Clear Clear    Glucose Urine Negative Negative mg/dL    Bilirubin Urine Negative  Negative    Ketones Urine Negative Negative mg/dL    Specific Gravity Urine 1.025 1.001 - 1.030    Blood Urine 0.03 mg/dL (A) Negative    pH Urine 7.0 5.0 - 7.0    Protein Albumin Urine 20  (A) Negative mg/dL    Urobilinogen Urine <2.0 <2.0 mg/dL    Nitrite Urine Negative Negative    Leukocyte Esterase Urine Negative Negative    Mucus Urine Present (A) None Seen /LPF    Amorphous Crystals Urine Few (A) None Seen /HPF    RBC Urine 3 (H) <=2 /HPF    WBC Urine 1 <=5 /HPF   CBC with platelets and differential   Result Value Ref Range    WBC Count 16.7 (H) 4.0 - 11.0 10e3/uL    RBC Count 5.20 4.40 - 5.90 10e6/uL    Hemoglobin 15.8 13.3 - 17.7 g/dL    Hematocrit 46.1 40.0 - 53.0 %    MCV 89 78 - 100 fL    MCH 30.4 26.5 - 33.0 pg    MCHC 34.3 31.5 - 36.5 g/dL    RDW 13.6 10.0 - 15.0 %    Platelet Count 186 150 - 450 10e3/uL    % Neutrophils 90 %    % Lymphocytes 4 %    % Monocytes 5 %    % Eosinophils 0 %    % Basophils 0 %    % Immature Granulocytes 1 %    NRBCs per 100 WBC 0 <1 /100    Absolute Neutrophils 15.2 (H) 1.6 - 8.3 10e3/uL    Absolute Lymphocytes 0.6 (L) 0.8 - 5.3 10e3/uL    Absolute Monocytes 0.8 0.0 - 1.3 10e3/uL    Absolute Eosinophils 0.0 0.0 - 0.7 10e3/uL    Absolute Basophils 0.1 0.0 - 0.2 10e3/uL    Absolute Immature Granulocytes 0.1 <=0.4 10e3/uL    Absolute NRBCs 0.0 10e3/uL   Lactic acid whole blood   Result Value Ref Range    Lactic Acid 2.5 (H) 0.7 - 2.0 mmol/L   Creatinine POCT   Result Value Ref Range    Creatinine POCT 0.7 0.7 - 1.3 mg/dL    GFR, ESTIMATED POCT >60 >60 mL/min/1.73m2           [facility]  [phone]

## 2022-09-02 NOTE — PROGRESS NOTES
[facility] [place] [facility] [place]      ASSESSMENT:    Principal Problem:    History of diabetes mellitus  Active Problems:    HTN (hypertension)    Acute appendicitis with localized peritonitis, without perforation, abscess, or gangrene    Pulmonary nodules    Hyponatremia    Lactic acidemia      PLAN:     76-year-old male with history of hypertension, hyperlipidemia, anxiety and sleep disorder and remote history of DM2 presents with appendicitis  [facility] [place]      History of DM2: Patient has not been on diabetic medications for years.  Last hemoglobin A1c was 5.4 9/15/21  -- Stop dextrose containing IV fluid  -- Placed on IV normal saline  -- Trend glucoses and cover with sliding scale insulin  -- Follow-up for glucose control      Acute appendicitis: Status post lap appendectomy 9/2/2022 with evidence of gangrenous appendix and localized peritonitis  --Continue IV Zosyn and IV fluids      Lactic acidemia: Likely secondary to above  --Trend for improvement with IV fluids      Hyponatremia: Likely secondary to elevated ADH state from acute infection abdominal pain  --Monitor for improvement with IV fluids in the a.m.      Incidental finding partially visualized smoothly marginated 10 mm right middle lobe nodule   -- Recommend CT chest as outpatient      History of hypertension: Hold home Hyzaar given acute illness, have as needed hydralazine available      History of hyperlipidemia: Resume home statin after discharge      History of anxiety and sleep disorder: Continue home trazodone          DVT PPX: Subcu heparin          Walt Ignacio D.O.              -------------------------------------------------------------------------------------------------------------  SUBJECTIVE: NAD. Denies any nausea, vomiting, abdominal pain, chest pain, SOB, new swelling, fevers, chills, confusion or headache.     Exam:  BP (!) 147/75 (BP Location: Right arm, Patient Position: Supine)   Pulse 88    "Temp 99.3  F (37.4  C) (Oral)   Resp 20   Ht 1.778 m (5' 10\")   Wt 111.9 kg (246 lb 9.6 oz)   SpO2 95%   BMI 35.38 kg/m    General: NAD  RESPIRATORY: Breathing nonlabored  CARDIOVASCULAR: No le edema bilat.   ABDOMEN: Deferred  NEUROLOGIC: Alert, motor grossly intact, speech clear      Diagnostics Reviewed:      Recent Results (from the past 24 hour(s))   Extra Green Top (Lithium Heparin) Tube    Collection Time: 09/02/22  8:26 AM   Result Value Ref Range    Hold Specimen JIC    Extra Purple Top Tube    Collection Time: 09/02/22  8:26 AM   Result Value Ref Range    Hold Specimen JIC    Lipase    Collection Time: 09/02/22  8:26 AM   Result Value Ref Range    Lipase 19 0 - 52 U/L   Basic metabolic panel    Collection Time: 09/02/22  8:26 AM   Result Value Ref Range    Sodium 129 (L) 136 - 145 mmol/L    Potassium 4.3 3.5 - 5.0 mmol/L    Chloride 95 (L) 98 - 107 mmol/L    Carbon Dioxide (CO2) 26 22 - 31 mmol/L    Anion Gap 8 5 - 18 mmol/L    Urea Nitrogen 13 8 - 28 mg/dL    Creatinine 0.90 0.70 - 1.30 mg/dL    Calcium 10.6 (H) 8.5 - 10.5 mg/dL    Glucose 154 (H) 70 - 125 mg/dL    GFR Estimate 87 >60 mL/min/1.73m2   Hepatic function panel    Collection Time: 09/02/22  8:26 AM   Result Value Ref Range    Bilirubin Total 1.0 0.0 - 1.0 mg/dL    Bilirubin Direct 0.3 <=0.5 mg/dL    Protein Total 7.2 6.0 - 8.0 g/dL    Albumin 3.9 3.5 - 5.0 g/dL    Alkaline Phosphatase 49 45 - 120 U/L    AST 21 0 - 40 U/L    ALT 24 0 - 45 U/L   CBC with platelets and differential    Collection Time: 09/02/22  8:26 AM   Result Value Ref Range    WBC Count 16.7 (H) 4.0 - 11.0 10e3/uL    RBC Count 5.20 4.40 - 5.90 10e6/uL    Hemoglobin 15.8 13.3 - 17.7 g/dL    Hematocrit 46.1 40.0 - 53.0 %    MCV 89 78 - 100 fL    MCH 30.4 26.5 - 33.0 pg    MCHC 34.3 31.5 - 36.5 g/dL    RDW 13.6 10.0 - 15.0 %    Platelet Count 186 150 - 450 10e3/uL    % Neutrophils 90 %    % Lymphocytes 4 %    % Monocytes 5 %    % Eosinophils 0 %    % Basophils 0 %    % " Immature Granulocytes 1 %    NRBCs per 100 WBC 0 <1 /100    Absolute Neutrophils 15.2 (H) 1.6 - 8.3 10e3/uL    Absolute Lymphocytes 0.6 (L) 0.8 - 5.3 10e3/uL    Absolute Monocytes 0.8 0.0 - 1.3 10e3/uL    Absolute Eosinophils 0.0 0.0 - 0.7 10e3/uL    Absolute Basophils 0.1 0.0 - 0.2 10e3/uL    Absolute Immature Granulocytes 0.1 <=0.4 10e3/uL    Absolute NRBCs 0.0 10e3/uL   UA with Microscopic reflex to Culture    Collection Time: 09/02/22  9:04 AM    Specimen: Urine, Clean Catch   Result Value Ref Range    Color Urine Orange (A) Colorless, Straw, Light Yellow, Yellow    Appearance Urine Clear Clear    Glucose Urine Negative Negative mg/dL    Bilirubin Urine Negative Negative    Ketones Urine Negative Negative mg/dL    Specific Gravity Urine 1.025 1.001 - 1.030    Blood Urine 0.03 mg/dL (A) Negative    pH Urine 7.0 5.0 - 7.0    Protein Albumin Urine 20  (A) Negative mg/dL    Urobilinogen Urine <2.0 <2.0 mg/dL    Nitrite Urine Negative Negative    Leukocyte Esterase Urine Negative Negative    Mucus Urine Present (A) None Seen /LPF    Amorphous Crystals Urine Few (A) None Seen /HPF    RBC Urine 3 (H) <=2 /HPF    WBC Urine 1 <=5 /HPF   Creatinine POCT    Collection Time: 09/02/22 10:35 AM   Result Value Ref Range    Creatinine POCT 0.7 0.7 - 1.3 mg/dL    GFR, ESTIMATED POCT >60 >60 mL/min/1.73m2   Lactic acid whole blood    Collection Time: 09/02/22 10:52 AM   Result Value Ref Range    Lactic Acid 2.5 (H) 0.7 - 2.0 mmol/L   Asymptomatic COVID-19 Virus (Coronavirus) by PCR Nasopharyngeal    Collection Time: 09/02/22 11:58 AM    Specimen: Nasopharyngeal; Swab   Result Value Ref Range    SARS CoV2 PCR Negative Negative   Platelet count    Collection Time: 09/02/22  5:14 PM   Result Value Ref Range    Platelet Count 121 (L) 150 - 450 10e3/uL   Lactic acid whole blood    Collection Time: 09/02/22  5:14 PM   Result Value Ref Range    Lactic Acid 2.5 (H) 0.7 - 2.0 mmol/L

## 2022-09-02 NOTE — PHARMACY-ADMISSION MEDICATION HISTORY
Pharmacy Note - Admission Medication History    Pertinent Provider Information: likely had aspirin this am in [employer] [place] product     ______________________________________________________________________    Prior To Admission (PTA) med list completed and updated in EMR.       PTA Med List   Medication Sig Last Dose     aspirin effervescent (HEIDE-SELTZER) 325 MG TBEF tablet Take 1 tablet by mouth 2 times daily as needed 9/2/2022 at Unknown time     losartan-hydrochlorothiazide (HYZAAR) 100-12.5 mg per tablet [LOSARTAN-HYDROCHLOROTHIAZIDE (HYZAAR) 100-12.5 MG PER TABLET] Take 1 tablet by mouth daily. 9/2/2022 at Unknown time     Multiple Vitamins-Minerals (MENS 50+ MULTI VITAMIN/MIN PO) Take 1 tablet by mouth daily 9/2/2022 at Unknown time     pravastatin (PRAVACHOL) 10 MG tablet [PRAVASTATIN (PRAVACHOL) 10 MG TABLET] Take 10 mg by mouth at bedtime. 9/1/2022 at Unknown time     traZODone (DESYREL) 50 MG tablet [TRAZODONE (DESYREL) 50 MG TABLET] Take 50 mg by mouth at bedtime. 9/1/2022 at Unknown time       Information source(s): Patient, Family member and [facility]  Method of interview communication: in-person    Summary of Changes to PTA Med List  New: heide seltzer  Discontinued: fluoxetine, fish oil  Changed: none    Patient was asked about OTC/herbal products specifically.  PTA med list reflects this.    In the past week, patient estimated taking medication this percent of the time:  greater than 90%.    Allergies were reviewed, assessed, and updated with the patient.      Patient does not use any multi-dose medications prior to admission.    The information provided in this note is only as accurate as the sources available at the time of the update(s).    Thank you for the opportunity to participate in the care of this patient.    Angelique Che RPH  9/2/2022 12:46 PM

## 2022-09-02 NOTE — ANESTHESIA PROCEDURE NOTES
Airway       Patient location during procedure: OR       Procedure Start/Stop Times: 9/2/2022 2:16 PM  Staff -        CRNA: Speedy Johnson APRN CRNA       Performed By: CRNA  Consent for Airway        Urgency: elective  Indications and Patient Condition       Indications for airway management: richie-procedural       Induction type:RSI (modified)       Mask difficulty assessment: 1 - vent by mask (minimal)    Final Airway Details       Final airway type: endotracheal airway       Successful airway: ETT - single  Endotracheal Airway Details        ETT size (mm): 8.0       Cuffed: yes       Successful intubation technique: video laryngoscopy (smooth intubation)       VL Blade Size: Glidescope 4       Grade View of Cords: 1       Adjucts: stylet       Position: Right       Measured from: gums/teeth       Secured at (cm): 24       Bite block used: None    Post intubation assessment        Placement verified by: capnometry, equal breath sounds and chest rise        Number of attempts at approach: 1       Secured with: silk tape       Ease of procedure: easy       Dentition: Intact and Unchanged    Medication(s) Administered   Medication Administration Time: 9/2/2022 2:16 PM

## 2022-09-02 NOTE — OP NOTE
Operative Note:  Laparoscopic Appendectomy;      Kimani Glover  1945    APPENDECTOMY, LAPAROSCOPIC (N/A)    Pre-Procedure Diagnosis:  Appendicitis [K37]     Post-Procedure Diagnosis:    *Gangrenous Appendicits with Localized Peritonitis    Surgeon(s):  Robi Carmona MD    Anesthesia Type:  GET      Findings:  Gangrenous appendix with localized peritonitis      Operative Report:    The patient was brought to the operating room placed in the supine position and given general endotracheal anesthesia.    The infra umbilical area was infused with local anesthesia and a 5 mm incision was made.  The 5 mm trocar was advanced into the intra-abdominal cavity under direct visualization of the laparoscope utilizing the Optiview technique.  A pneumoperitoneum was brought up to 15 mmHg and then under direct visualization 2 additional trochars were placed under direct vision additional laparoscope and a 5 mm trochars placed in the lower midline and a 5 mm trochars placed in the left lower quadrant of the abdomen.  Later in the case the periumbilical trocar was upsized to a 12 mm trocar.    The appendix was then identified by visualizing the right lower quadrant of the abdomen.  Was very deep in the fatty tissues inferomedial to the cecum.  To get to these tissues I did dissected through some omentum and epiploic all extension from the sigmoid colon with a white nodule in this fatty tissue.  This was dissected free and taken out as a specimen.      The appendix was gangrenous and buried in surrounding tissues with localized peritonitis.  The appendix was isolated from its surrounding structures with use of the [place] LigaSure device.  I dissected up and around the cecum such that I could see posterior to where the appendiceal orifice was located.  I skeletonized the tissues around the base of the appendix.  I then brought in a 60 mm blue load Endo SPEEDY stapler.  I stapled across the base of the appendix including some  of the cecum in that staple line to get back to good healthy tissue.  This one staple line completely mobilized the appendix which I placed in an Endo Catch bag and brought out through the 12 mm trocar.  I then closed that trocar site with a 0 Vicryl suture utilizing the Endo fascial closure device under direct visualization of the laparoscope.  I then left that suture loose at the fascia of the 12 mm trocar site and readvanced the trocar.  I irrigated the abdominal cavity copiously and cleaned the operative field   The pneumoperitoneum was aspirated with suction.  The trochars were removed the 0 Vicryl at the 12 mm trocar site was used to close the fascia of that trocar site.  All skin incisions were closed with 4-0 subcuticular Monocryl suture.  The wounds were dressed with Telfa and Tegaderm.  The site where the tube comes out was closed with gauze and tape.  The patient was extubated and taken to recovery.    Estimated Blood Loss: 5 cc    Specimens:    Appendix       Drains:   Drain Anterior Abdomen 19 Fr. (Active)       Complications:    None    Robi Carmona

## 2022-09-02 NOTE — ANESTHESIA CARE TRANSFER NOTE
Patient: Kimani Glover    Procedure: Procedure(s):  APPENDECTOMY, LAPAROSCOPIC       Diagnosis: Acute appendicitis with localized peritonitis, without perforation, abscess, or gangrene [K35.30]  Diagnosis Additional Information: No value filed.    Anesthesia Type:   General     Note:    Oropharynx: oropharynx clear of all foreign objects and spontaneously breathing  Level of Consciousness: awake and drowsy  Oxygen Supplementation: face mask  Level of Supplemental Oxygen (L/min / FiO2): 8    Dentition: dentition unchanged  Vital Signs Stable: post-procedure vital signs reviewed and stable  Report to RN Given: handoff report given  Patient transferred to: PACU    Handoff Report: Identifed the Patient, Identified the Reponsible Provider, Reviewed the pertinent medical history, Discussed the surgical course, Reviewed Intra-OP anesthesia mangement and issues during anesthesia, Set expectations for post-procedure period and Allowed opportunity for questions and acknowledgement of understanding      Vitals:  Vitals Value Taken Time   /76 09/02/22 1525   Temp     Pulse 94 09/02/22 1530   Resp 23 09/02/22 1530   SpO2 100 % 09/02/22 1530   Vitals shown include unvalidated device data.    Electronically Signed By: MARIAH Willard CRNA  September 2, 2022  3:31 PM

## 2022-09-02 NOTE — ANESTHESIA POSTPROCEDURE EVALUATION
Patient: Kimani Glover    Procedure: Procedure(s):  APPENDECTOMY, LAPAROSCOPIC       Anesthesia Type:  General    Note:     Postop Pain Control: Uneventful            Sign Out: Well controlled pain   PONV: No   Neuro/Psych: Uneventful            Sign Out: Acceptable/Baseline neuro status   Airway/Respiratory: Uneventful            Sign Out: Acceptable/Baseline resp. status   CV/Hemodynamics: Uneventful            Sign Out: Acceptable CV status; No obvious hypovolemia; No obvious fluid overload   Other NRE: NONE   DID A NON-ROUTINE EVENT OCCUR? No           Last vitals:  Vitals Value Taken Time   /77 09/02/22 1635   Temp 36.9  C (98.4  F) 09/02/22 1635   Pulse 92 09/02/22 1635   Resp 20 09/02/22 1635   SpO2 94 % 09/02/22 1635       Electronically Signed By: Princess Li MD  September 2, 2022  5:55 PM

## 2022-09-02 NOTE — ED TRIAGE NOTES
Pt arrives with RUQ pain since yesterday, worsening today - particularly with movement. Dry heaving this morning. No urinary symptoms.

## 2022-09-03 VITALS
WEIGHT: 246.6 LBS | TEMPERATURE: 98.1 F | HEART RATE: 73 BPM | HEIGHT: 70 IN | OXYGEN SATURATION: 95 % | RESPIRATION RATE: 20 BRPM | DIASTOLIC BLOOD PRESSURE: 56 MMHG | SYSTOLIC BLOOD PRESSURE: 121 MMHG | BODY MASS INDEX: 35.3 KG/M2

## 2022-09-03 LAB
ALBUMIN SERPL-MCNC: 3.1 G/DL (ref 3.5–5)
ALP SERPL-CCNC: 35 U/L (ref 45–120)
ALT SERPL W P-5'-P-CCNC: 20 U/L (ref 0–45)
ANION GAP SERPL CALCULATED.3IONS-SCNC: 5 MMOL/L (ref 5–18)
AST SERPL W P-5'-P-CCNC: 18 U/L (ref 0–40)
BILIRUB SERPL-MCNC: 0.8 MG/DL (ref 0–1)
BUN SERPL-MCNC: 11 MG/DL (ref 8–28)
CALCIUM SERPL-MCNC: 9.5 MG/DL (ref 8.5–10.5)
CHLORIDE BLD-SCNC: 104 MMOL/L (ref 98–107)
CO2 SERPL-SCNC: 25 MMOL/L (ref 22–31)
CREAT SERPL-MCNC: 0.85 MG/DL (ref 0.7–1.3)
ERYTHROCYTE [DISTWIDTH] IN BLOOD BY AUTOMATED COUNT: 14 % (ref 10–15)
GFR SERPL CREATININE-BSD FRML MDRD: 89 ML/MIN/1.73M2
GLUCOSE BLD-MCNC: 128 MG/DL (ref 70–125)
GLUCOSE BLDC GLUCOMTR-MCNC: 125 MG/DL (ref 70–99)
GLUCOSE BLDC GLUCOMTR-MCNC: 96 MG/DL (ref 70–99)
HCT VFR BLD AUTO: 39.6 % (ref 40–53)
HGB BLD-MCNC: 13.1 G/DL (ref 13.3–17.7)
LACTATE SERPL-SCNC: 1 MMOL/L (ref 0.7–2)
MCH RBC QN AUTO: 30 PG (ref 26.5–33)
MCHC RBC AUTO-ENTMCNC: 33.1 G/DL (ref 31.5–36.5)
MCV RBC AUTO: 91 FL (ref 78–100)
PLATELET # BLD AUTO: 120 10E3/UL (ref 150–450)
POTASSIUM BLD-SCNC: 4.3 MMOL/L (ref 3.5–5)
PROT SERPL-MCNC: 6 G/DL (ref 6–8)
RBC # BLD AUTO: 4.36 10E6/UL (ref 4.4–5.9)
SODIUM SERPL-SCNC: 134 MMOL/L (ref 136–145)
WBC # BLD AUTO: 12.3 10E3/UL (ref 4–11)

## 2022-09-03 PROCEDURE — 258N000003 HC RX IP 258 OP 636: Performed by: INTERNAL MEDICINE

## 2022-09-03 PROCEDURE — 85027 COMPLETE CBC AUTOMATED: CPT | Performed by: SURGERY

## 2022-09-03 PROCEDURE — 36415 COLL VENOUS BLD VENIPUNCTURE: CPT | Performed by: INTERNAL MEDICINE

## 2022-09-03 PROCEDURE — 83605 ASSAY OF LACTIC ACID: CPT | Performed by: INTERNAL MEDICINE

## 2022-09-03 PROCEDURE — 250N000013 HC RX MED GY IP 250 OP 250 PS 637: Performed by: SURGERY

## 2022-09-03 PROCEDURE — 250N000011 HC RX IP 250 OP 636: Performed by: SURGERY

## 2022-09-03 PROCEDURE — 99232 SBSQ HOSP IP/OBS MODERATE 35: CPT | Performed by: INTERNAL MEDICINE

## 2022-09-03 PROCEDURE — 80053 COMPREHEN METABOLIC PANEL: CPT | Performed by: SURGERY

## 2022-09-03 RX ORDER — ACETAMINOPHEN 500 MG
1000 TABLET ORAL EVERY 6 HOURS PRN
COMMUNITY
Start: 2022-09-03

## 2022-09-03 RX ORDER — LOSARTAN POTASSIUM AND HYDROCHLOROTHIAZIDE 12.5; 1 MG/1; MG/1
1 TABLET ORAL DAILY
Status: ON HOLD
Start: 2022-09-04 | End: 2023-06-21

## 2022-09-03 RX ADMIN — PIPERACILLIN AND TAZOBACTAM 3.38 G: 3; .375 INJECTION, POWDER, LYOPHILIZED, FOR SOLUTION INTRAVENOUS at 02:10

## 2022-09-03 RX ADMIN — PIPERACILLIN AND TAZOBACTAM 3.38 G: 3; .375 INJECTION, POWDER, LYOPHILIZED, FOR SOLUTION INTRAVENOUS at 09:00

## 2022-09-03 RX ADMIN — SODIUM CHLORIDE, PRESERVATIVE FREE: 5 INJECTION INTRAVENOUS at 05:13

## 2022-09-03 RX ADMIN — HEPARIN SODIUM 5000 UNITS: 10000 INJECTION, SOLUTION INTRAVENOUS; SUBCUTANEOUS at 08:59

## 2022-09-03 RX ADMIN — ACETAMINOPHEN 975 MG: 325 TABLET ORAL at 05:05

## 2022-09-03 RX ADMIN — POLYETHYLENE GLYCOL 3350 17 G: 17 POWDER, FOR SOLUTION ORAL at 08:15

## 2022-09-03 RX ADMIN — ACETAMINOPHEN 975 MG: 325 TABLET ORAL at 12:28

## 2022-09-03 RX ADMIN — DOCUSATE SODIUM 50 MG AND SENNOSIDES 8.6 MG 1 TABLET: 8.6; 5 TABLET, FILM COATED ORAL at 08:15

## 2022-09-03 RX ADMIN — FAMOTIDINE 20 MG: 20 TABLET ORAL at 08:16

## 2022-09-03 ASSESSMENT — ACTIVITIES OF DAILY LIVING (ADL)
ADLS_ACUITY_SCORE: 35

## 2022-09-03 NOTE — PLAN OF CARE
"  Problem: Plan of Care - These are the overarching goals to be used throughout the patient stay.    Goal: Plan of Care Review/Shift Note  Description: The Plan of Care Review/Shift note should be completed every shift.  The Outcome Evaluation is a brief statement about your assessment that the patient is improving, declining, or no change.  This information will be displayed automatically on your shift note.  9/2/2022 1903 by Ivett Gurrola RN  Outcome: Ongoing, Progressing  9/2/2022 1903 by Ivett Gurrola RN  Outcome: Ongoing, Progressing  Goal: Patient-Specific Goal (Individualized)  Description: You can add care plan individualizations to a care plan. Examples of Individualization might be:  \"Parent requests to be called daily at 9am for status\", \"I have a hard time hearing out of my right ear\", or \"Do not touch me to wake me up as it startles me\".  9/2/2022 1903 by Ivett Gurrola RN  Outcome: Ongoing, Progressing  9/2/2022 1903 by Ivett Gurrola RN  Outcome: Ongoing, Progressing  Goal: Absence of Hospital-Acquired Illness or Injury  9/2/2022 1903 by Ivett Gurrola RN  Outcome: Ongoing, Progressing  9/2/2022 1903 by Ivett Gurrola RN  Outcome: Ongoing, Progressing  Intervention: Identify and Manage Fall Risk  Recent Flowsheet Documentation  Taken 9/2/2022 1700 by Ivett Gurrola RN  Safety Promotion/Fall Prevention:   activity supervised   assistive device/personal items within reach   clutter free environment maintained   patient and family education   lighting adjusted  Intervention: Prevent and Manage VTE (Venous Thromboembolism) Risk  Recent Flowsheet Documentation  Taken 9/2/2022 1700 by Ivett Gurrola RN  VTE Prevention/Management: SCDs (sequential compression devices) on  Goal: Optimal Comfort and Wellbeing  9/2/2022 1903 by Ivett Gurrola RN  Outcome: Ongoing, Progressing  9/2/2022 1903 by Ivett Gurrola RN  Outcome: Ongoing, Progressing  Goal: Readiness " "for Transition of Care  9/2/2022 1903 by Ivett Gurrola, RN  Outcome: Ongoing, Progressing  9/2/2022 1903 by Ivett Gurrola, RN  Outcome: Ongoing, Progressing     Problem: Fluid and Electrolyte Imbalance (Surgery Nonspecified)  Goal: Fluid and Electrolyte Balance  Outcome: Ongoing, Progressing     Problem: Infection (Surgery Nonspecified)  Goal: Absence of Infection Signs and Symptoms  Outcome: Ongoing, Progressing     Problem: Pain (Surgery Nonspecified)  Goal: Acceptable Pain Control  Outcome: Ongoing, Progressing     Problem: Postoperative Urinary Retention (Surgery Nonspecified)  Goal: Effective Urinary Elimination  Outcome: Ongoing, Progressing     Problem: Respiratory Compromise (Surgery Nonspecified)  Goal: Effective Oxygenation and Ventilation  Outcome: Ongoing, Progressing  VSS post op. RA sats >90%. SCD\"S in place. Pt informed to call for assist to stand at bedside for next void. Pt voided 650cc clear, pale yellow urine. Bladder scan 56 after. Pt eating small amount clears, decreased appetite at this time. No c/o pain. Pt states having high pain tolerance. Educated re pain scale and not to wait to long if pain level increases. Pt refused ice to incisions at this time.     Goal Outcome Evaluation:                      "

## 2022-09-03 NOTE — PROGRESS NOTES
ASSESSMENT:  1. Acute appendicitis with localized peritonitis, without perforation, abscess, or gangrene    2. Pulmonary nodules        Kimani Glover is a 76 year old male who is s/p laparoscopic appendectomy on September 2.  He is recovering well.  Foresee no issues with him discharging home today.    PLAN:  1.  Okay for discharge home today  2.  With near normalization of his leukocyte count, I do not think he needs additional IV antibiotics or oral antibiotics on discharge    SUBJECTIVE:   He is doing well this morning.  Urinating without difficulty, pain is well controlled and tolerating a diet without any issue.      Patient Vitals for the past 24 hrs:   BP Temp Temp src Pulse Resp SpO2 Weight   09/03/22 0906 -- -- -- -- -- 96 % --   09/03/22 0824 -- -- -- -- -- 93 % --   09/03/22 0708 112/72 98.1  F (36.7  C) Oral 76 20 98 % --   09/03/22 0506 -- -- -- -- -- 97 % --   09/02/22 2343 111/61 98.4  F (36.9  C) Oral 82 20 97 % --   09/02/22 2000 -- -- -- -- -- (!) 86 % --   09/02/22 1905 118/58 98.8  F (37.1  C) Oral 91 20 92 % --   09/02/22 1805 130/68 99.2  F (37.3  C) Oral 91 18 93 % --   09/02/22 1704 (!) 147/75 99.3  F (37.4  C) Oral 88 20 95 % --   09/02/22 1635 (!) 142/77 98.4  F (36.9  C) Oral 92 20 94 % 111.9 kg (246 lb 9.6 oz)   09/02/22 1600 (!) 159/72 98.2  F (36.8  C) -- 103 -- 92 % --   09/02/22 1550 (!) 165/73 -- -- 102 24 97 % --   09/02/22 1540 121/57 -- -- 89 21 98 % --   09/02/22 1530 (!) 147/64 -- -- 94 23 100 % --   09/02/22 1520 (!) 163/76 99.6  F (37.6  C) Temporal -- -- 100 % --   09/02/22 1314 (!) 168/80 -- Oral 93 18 99 % --   09/02/22 1245 -- -- -- 97 24 100 % --   09/02/22 1230 -- -- -- 96 26 98 % --   09/02/22 1220 124/68 99.5  F (37.5  C) Oral 99 -- 98 % --   09/02/22 1215 124/68 -- -- 101 -- 98 % --         PHYSICAL EXAM:  GEN: No acute distress, comfortable  LUNGS: CTA bilaterally  CV:RRR  ABD: Soft, surgical dressings in place, not taken down during exam.  Only mild distention and  appropriate tenderness for postoperative day 1.  EXT:No cyanosis, edema or obvious abnormalities    09/02 0700 - 09/03 0659  In: 1910 [I.V.:1910]  Out: 1505 [Urine:1500]    Admission on 09/02/2022   Component Date Value     Hold Specimen 09/02/2022 JIC      Hold Specimen 09/02/2022 JIC      Lipase 09/02/2022 19      Sodium 09/02/2022 129 (A)     Potassium 09/02/2022 4.3      Chloride 09/02/2022 95 (A)     Carbon Dioxide (CO2) 09/02/2022 26      Anion Gap 09/02/2022 8      Urea Nitrogen 09/02/2022 13      Creatinine 09/02/2022 0.90      Calcium 09/02/2022 10.6 (A)     Glucose 09/02/2022 154 (A)     GFR Estimate 09/02/2022 87      Bilirubin Total 09/02/2022 1.0      Bilirubin Direct 09/02/2022 0.3      Protein Total 09/02/2022 7.2      Albumin 09/02/2022 3.9      Alkaline Phosphatase 09/02/2022 49      AST 09/02/2022 21      ALT 09/02/2022 24      Color Urine 09/02/2022 Orange (A)     Appearance Urine 09/02/2022 Clear      Glucose Urine 09/02/2022 Negative      Bilirubin Urine 09/02/2022 Negative      Ketones Urine 09/02/2022 Negative      Specific Gravity Urine 09/02/2022 1.025      Blood Urine 09/02/2022 0.03 mg/dL (A)     pH Urine 09/02/2022 7.0      Protein Albumin Urine 09/02/2022 20  (A)     Urobilinogen Urine 09/02/2022 <2.0      Nitrite Urine 09/02/2022 Negative      Leukocyte Esterase Urine 09/02/2022 Negative      Mucus Urine 09/02/2022 Present (A)     Amorphous Crystals Urine 09/02/2022 Few (A)     RBC Urine 09/02/2022 3 (A)     WBC Urine 09/02/2022 1      WBC Count 09/02/2022 16.7 (A)     RBC Count 09/02/2022 5.20      Hemoglobin 09/02/2022 15.8      Hematocrit 09/02/2022 46.1      MCV 09/02/2022 89      MCH 09/02/2022 30.4      MCHC 09/02/2022 34.3      RDW 09/02/2022 13.6      Platelet Count 09/02/2022 186      % Neutrophils 09/02/2022 90      % Lymphocytes 09/02/2022 4      % Monocytes 09/02/2022 5      % Eosinophils 09/02/2022 0      % Basophils 09/02/2022 0      % Immature Granulocytes 09/02/2022 1       NRBCs per 100 WBC 09/02/2022 0      Absolute Neutrophils 09/02/2022 15.2 (A)     Absolute Lymphocytes 09/02/2022 0.6 (A)     Absolute Monocytes 09/02/2022 0.8      Absolute Eosinophils 09/02/2022 0.0      Absolute Basophils 09/02/2022 0.1      Absolute Immature Granul* 09/02/2022 0.1      Absolute NRBCs 09/02/2022 0.0      Lactic Acid 09/02/2022 2.5 (A)     Creatinine POCT 09/02/2022 0.7      GFR, ESTIMATED POCT 09/02/2022 >60      SARS CoV2 PCR 09/02/2022 Negative      Platelet Count 09/02/2022 121 (A)     Lactic Acid 09/02/2022 2.5 (A)     GLUCOSE BY METER POCT 09/02/2022 190 (A)     GLUCOSE BY METER POCT 09/02/2022 160 (A)     Sodium 09/03/2022 134 (A)     Potassium 09/03/2022 4.3      Chloride 09/03/2022 104      Carbon Dioxide (CO2) 09/03/2022 25      Anion Gap 09/03/2022 5      Urea Nitrogen 09/03/2022 11      Creatinine 09/03/2022 0.85      Calcium 09/03/2022 9.5      Glucose 09/03/2022 128 (A)     Alkaline Phosphatase 09/03/2022 35 (A)     AST 09/03/2022 18      ALT 09/03/2022 20      Protein Total 09/03/2022 6.0      Albumin 09/03/2022 3.1 (A)     Bilirubin Total 09/03/2022 0.8      GFR Estimate 09/03/2022 89      WBC Count 09/03/2022 12.3 (A)     RBC Count 09/03/2022 4.36 (A)     Hemoglobin 09/03/2022 13.1 (A)     Hematocrit 09/03/2022 39.6 (A)     MCV 09/03/2022 91      MCH 09/03/2022 30.0      MCHC 09/03/2022 33.1      RDW 09/03/2022 14.0      Platelet Count 09/03/2022 120 (A)     Lactic Acid 09/03/2022 1.0      GLUCOSE BY METER POCT 09/03/2022 125 (A)          Caleb Mccarthy MD

## 2022-09-03 NOTE — PLAN OF CARE
"Goal Outcome Evaluation:  Patient able to sleep between cares.  Denies pain but states the lap sites on abdomen are \"tight\".   Voiding in large amounts and passed PVR's.  On scheduled tylenol and continues on IV zosyn.  Drinking well and will advance diet to regular for breakfast.  Did get up to the restroom overnight.                      "

## 2022-09-03 NOTE — ADDENDUM NOTE
Addendum  created 09/03/22 1142 by Ana Ferro APRN CRNA    Intraprocedure Meds edited, Intraprocedure Staff edited

## 2022-09-03 NOTE — DISCHARGE INSTRUCTIONS
Follow up: It is our practice to have all patients follow up with us 2-3 weeks after their surgery to ensure they are recovering well.  For straightforward laparoscopic procedures, this can be done either in clinic as a scheduled follow up appointment, or over the phone.  If you would like a scheduled follow up appointment in clinic, please call us at [phone] to schedule an appointment at your convenience.  If you would prefer to follow up with us by phone please let us know so that we may contact you 2-3 weeks following your procedure.        Diet: Regular diet. Patients can have difficulty with constipation following surgery, due in part to the administration of narcotic medications.  If you are suffering with constipation, you should avoid foods such as hard cheeses or red meat.  Foods high in fiber are recommended.      Activity: You should continue to be active at home, including ambulating frequently.  If possible try to limit the amount of time spent in bed.    Restrictions: You have no lifting restrictions post operatively, but may wish to avoid strenuous physical activity for 1-2 weeks.  You should limit your physical activity if it causes you discomfort; however, this should resolve within 1-2 weeks.   Walking does not count as strenuous physical activity.  You are safe to walk up and down stairs.  Following 2 weeks you may resume all normal physical activity.    Wound / drain care: You may remove your Band-aids after a period of 48 hours.  The small white strips on the incisions act like artificial scabs, and will begin to peel at the edges at around 7-10 days.  These can then be removed.    It is normal to have a small rim of red present around the incisions. This should not, however, extend beyond 1/4 inch from the incision.  If your incisions become increasingly tender, red, or draining, please contact us.       Bathing: You may shower after 48 hours from surgery.  It is ok to get your incisions  wet, but avoid rubbing them.  Avoid soaking in bath tubs, or swimming in lakes, pools, or streams for 4 weeks following surgery.

## 2022-09-03 NOTE — PROGRESS NOTES
[facility] Progress Note      ASSESSMENT:    Principal Problem:    History of diabetes mellitus  Active Problems:    HTN (hypertension)    Acute appendicitis with localized peritonitis, without perforation, abscess, or gangrene    Pulmonary nodules    Hyponatremia    Lactic acidemia      PLAN:     76-year-old male with history of hypertension, hyperlipidemia, anxiety and sleep disorder and remote history of DM2 presents with appendicitis  [facility] consulted for medical management      History of DM2: Patient has not been on diabetic medications for years.  Last hemoglobin A1c was 5.4 9/15/21  -- patient only required 2 U on insulin overnight despite getting dexamethasone and dextrose containing IV fluid perioperatively  -- if patient not requiring any more insulin today will stop checking  -- continue sliding scale insulin only as needed and change to diabetic diet      Acute appendicitis: Status post lap appendectomy 9/2/2022 with evidence of gangrenous appendix and localized peritonitis  -- defer antibiotics to surgery team      Lactic acidemia: Likely secondary to above  --resolved with IV fluids, will stop further IVF for now      Hyponatremia:   -- improved with IV fluids, will stop further IVF for now       Incidental finding partially visualized smoothly marginated 10 mm right middle lobe nodule   -- CT chest as outpatient ordered      History of hypertension: Resume home Hyzaar after dishcharge      History of hyperlipidemia: Resume home statin after discharge      History of anxiety and sleep disorder: Continue home trazodone          DVT PPX: Subcu heparin          Walt Ignacio D.O.              -------------------------------------------------------------------------------------------------------------  SUBJECTIVE: NAD. Denies any nausea, vomiting, abdominal pain, chest pain, SOB, new swelling, fevers, chills, confusion or headache.     Exam:  /72 (BP Location: Right arm)   Pulse 76    "Temp 98.1  F (36.7  C) (Oral)   Resp 20   Ht 1.778 m (5' 10\")   Wt 111.9 kg (246 lb 9.6 oz)   SpO2 98%   BMI 35.38 kg/m    General: NAD  RESPIRATORY: Breathing nonlabored  CARDIOVASCULAR: No le edema bilat.   ABDOMEN: mildly tender to palpation bilateral lower quadrants  NEUROLOGIC: Alert, motor grossly intact, speech clear      Diagnostics Reviewed:      Recent Results (from the past 24 hour(s))   Extra Green Top (Lithium Heparin) Tube    Collection Time: 09/02/22  8:26 AM   Result Value Ref Range    Hold Specimen JIC    Extra Purple Top Tube    Collection Time: 09/02/22  8:26 AM   Result Value Ref Range    Hold Specimen JIC    Lipase    Collection Time: 09/02/22  8:26 AM   Result Value Ref Range    Lipase 19 0 - 52 U/L   Basic metabolic panel    Collection Time: 09/02/22  8:26 AM   Result Value Ref Range    Sodium 129 (L) 136 - 145 mmol/L    Potassium 4.3 3.5 - 5.0 mmol/L    Chloride 95 (L) 98 - 107 mmol/L    Carbon Dioxide (CO2) 26 22 - 31 mmol/L    Anion Gap 8 5 - 18 mmol/L    Urea Nitrogen 13 8 - 28 mg/dL    Creatinine 0.90 0.70 - 1.30 mg/dL    Calcium 10.6 (H) 8.5 - 10.5 mg/dL    Glucose 154 (H) 70 - 125 mg/dL    GFR Estimate 87 >60 mL/min/1.73m2   Hepatic function panel    Collection Time: 09/02/22  8:26 AM   Result Value Ref Range    Bilirubin Total 1.0 0.0 - 1.0 mg/dL    Bilirubin Direct 0.3 <=0.5 mg/dL    Protein Total 7.2 6.0 - 8.0 g/dL    Albumin 3.9 3.5 - 5.0 g/dL    Alkaline Phosphatase 49 45 - 120 U/L    AST 21 0 - 40 U/L    ALT 24 0 - 45 U/L   CBC with platelets and differential    Collection Time: 09/02/22  8:26 AM   Result Value Ref Range    WBC Count 16.7 (H) 4.0 - 11.0 10e3/uL    RBC Count 5.20 4.40 - 5.90 10e6/uL    Hemoglobin 15.8 13.3 - 17.7 g/dL    Hematocrit 46.1 40.0 - 53.0 %    MCV 89 78 - 100 fL    MCH 30.4 26.5 - 33.0 pg    MCHC 34.3 31.5 - 36.5 g/dL    RDW 13.6 10.0 - 15.0 %    Platelet Count 186 150 - 450 10e3/uL    % Neutrophils 90 %    % Lymphocytes 4 %    % Monocytes 5 %    % " Eosinophils 0 %    % Basophils 0 %    % Immature Granulocytes 1 %    NRBCs per 100 WBC 0 <1 /100    Absolute Neutrophils 15.2 (H) 1.6 - 8.3 10e3/uL    Absolute Lymphocytes 0.6 (L) 0.8 - 5.3 10e3/uL    Absolute Monocytes 0.8 0.0 - 1.3 10e3/uL    Absolute Eosinophils 0.0 0.0 - 0.7 10e3/uL    Absolute Basophils 0.1 0.0 - 0.2 10e3/uL    Absolute Immature Granulocytes 0.1 <=0.4 10e3/uL    Absolute NRBCs 0.0 10e3/uL   UA with Microscopic reflex to Culture    Collection Time: 09/02/22  9:04 AM    Specimen: Urine, Clean Catch   Result Value Ref Range    Color Urine Orange (A) Colorless, Straw, Light Yellow, Yellow    Appearance Urine Clear Clear    Glucose Urine Negative Negative mg/dL    Bilirubin Urine Negative Negative    Ketones Urine Negative Negative mg/dL    Specific Gravity Urine 1.025 1.001 - 1.030    Blood Urine 0.03 mg/dL (A) Negative    pH Urine 7.0 5.0 - 7.0    Protein Albumin Urine 20  (A) Negative mg/dL    Urobilinogen Urine <2.0 <2.0 mg/dL    Nitrite Urine Negative Negative    Leukocyte Esterase Urine Negative Negative    Mucus Urine Present (A) None Seen /LPF    Amorphous Crystals Urine Few (A) None Seen /HPF    RBC Urine 3 (H) <=2 /HPF    WBC Urine 1 <=5 /HPF   Creatinine POCT    Collection Time: 09/02/22 10:35 AM   Result Value Ref Range    Creatinine POCT 0.7 0.7 - 1.3 mg/dL    GFR, ESTIMATED POCT >60 >60 mL/min/1.73m2   Lactic acid whole blood    Collection Time: 09/02/22 10:52 AM   Result Value Ref Range    Lactic Acid 2.5 (H) 0.7 - 2.0 mmol/L   Asymptomatic COVID-19 Virus (Coronavirus) by PCR Nasopharyngeal    Collection Time: 09/02/22 11:58 AM    Specimen: Nasopharyngeal; Swab   Result Value Ref Range    SARS CoV2 PCR Negative Negative   Platelet count    Collection Time: 09/02/22  5:14 PM   Result Value Ref Range    Platelet Count 121 (L) 150 - 450 10e3/uL   Lactic acid whole blood    Collection Time: 09/02/22  5:14 PM   Result Value Ref Range    Lactic Acid 2.5 (H) 0.7 - 2.0 mmol/L   Glucose by  meter    Collection Time: 09/02/22  5:56 PM   Result Value Ref Range    GLUCOSE BY METER POCT 190 (H) 70 - 99 mg/dL   Glucose by meter    Collection Time: 09/02/22  8:51 PM   Result Value Ref Range    GLUCOSE BY METER POCT 160 (H) 70 - 99 mg/dL   Comprehensive metabolic panel    Collection Time: 09/03/22  6:06 AM   Result Value Ref Range    Sodium 134 (L) 136 - 145 mmol/L    Potassium 4.3 3.5 - 5.0 mmol/L    Chloride 104 98 - 107 mmol/L    Carbon Dioxide (CO2) 25 22 - 31 mmol/L    Anion Gap 5 5 - 18 mmol/L    Urea Nitrogen 11 8 - 28 mg/dL    Creatinine 0.85 0.70 - 1.30 mg/dL    Calcium 9.5 8.5 - 10.5 mg/dL    Glucose 128 (H) 70 - 125 mg/dL    Alkaline Phosphatase 35 (L) 45 - 120 U/L    AST 18 0 - 40 U/L    ALT 20 0 - 45 U/L    Protein Total 6.0 6.0 - 8.0 g/dL    Albumin 3.1 (L) 3.5 - 5.0 g/dL    Bilirubin Total 0.8 0.0 - 1.0 mg/dL    GFR Estimate 89 >60 mL/min/1.73m2   CBC with platelets    Collection Time: 09/03/22  6:06 AM   Result Value Ref Range    WBC Count 12.3 (H) 4.0 - 11.0 10e3/uL    RBC Count 4.36 (L) 4.40 - 5.90 10e6/uL    Hemoglobin 13.1 (L) 13.3 - 17.7 g/dL    Hematocrit 39.6 (L) 40.0 - 53.0 %    MCV 91 78 - 100 fL    MCH 30.0 26.5 - 33.0 pg    MCHC 33.1 31.5 - 36.5 g/dL    RDW 14.0 10.0 - 15.0 %    Platelet Count 120 (L) 150 - 450 10e3/uL   Lactic acid whole blood    Collection Time: 09/03/22  6:06 AM   Result Value Ref Range    Lactic Acid 1.0 0.7 - 2.0 mmol/L   Glucose by meter    Collection Time: 09/03/22  7:29 AM   Result Value Ref Range    GLUCOSE BY METER POCT 125 (H) 70 - 99 mg/dL

## 2022-09-05 LAB
ATRIAL RATE - MUSE: 96 BPM
DIASTOLIC BLOOD PRESSURE - MUSE: 68 MMHG
INTERPRETATION ECG - MUSE: NORMAL
P AXIS - MUSE: 79 DEGREES
PR INTERVAL - MUSE: 252 MS
QRS DURATION - MUSE: 86 MS
QT - MUSE: 336 MS
QTC - MUSE: 424 MS
R AXIS - MUSE: 54 DEGREES
SYSTOLIC BLOOD PRESSURE - MUSE: 124 MMHG
T AXIS - MUSE: 17 DEGREES
VENTRICULAR RATE- MUSE: 96 BPM

## 2022-09-06 LAB
PATH REPORT.COMMENTS IMP SPEC: NORMAL
PATH REPORT.COMMENTS IMP SPEC: NORMAL
PATH REPORT.FINAL DX SPEC: NORMAL
PATH REPORT.GROSS SPEC: NORMAL
PATH REPORT.MICROSCOPIC SPEC OTHER STN: NORMAL
PATH REPORT.RELEVANT HX SPEC: NORMAL
PHOTO IMAGE: NORMAL

## 2022-09-06 PROCEDURE — 88304 TISSUE EXAM BY PATHOLOGIST: CPT | Mod: 26 | Performed by: PATHOLOGY

## 2022-09-06 NOTE — DISCHARGE SUMMARY
[facility] MEDICINE  DISCHARGE SUMMARY      Primary Care Physician: Anibal Chamorro              Admission Date: 9/2/2022    Discharge Provider: Walt Ignacio DO Discharge Date: 9/3/2022    Diet: see discharge orders below Code Status: Full Code    Activity: as tolerated       Condition at Discharge: Stable      REASON FOR ADMISSION (See Admission Note for Details)      Appendicitis     PRINCIPAL DISCHARGE DIAGNOSIS    Principal Problem:    History of diabetes mellitus  Active Problems:    HTN (hypertension)    Acute appendicitis with localized peritonitis, without perforation, abscess, or gangrene    Pulmonary nodules    Hyponatremia    Lactic acidemia        SIGNIFICANT FINDINGS (Imaging, labs):      See below    PENDING LABS      None    PROCEDURES ( this hospitalization only)       Lap appy    RECOMMENDATION FOR F/U VISIT      See below    DISPOSITION ( home, home care, TCU...)      Home    SUMMARY OF HOSPITAL COURSE:       76-year-old male with history of hypertension, hyperlipidemia, anxiety and sleep disorder and remote history of DM2 presents with appendicitis  [facility] consulted for medical management        History of DM2: Patient has not been on diabetic medications for years.  Last hemoglobin A1c was 5.4 9/15/21  -- patient only required 2 U on insulin overnight despite getting dexamethasone and dextrose containing IV fluid perioperatively  -- outpatient PCP follow up        Acute appendicitis: Status post lap appendectomy 9/2/2022 with evidence of gangrenous appendix and localized peritonitis  -- surgery ok with discharge home, no need for further ABX        Lactic acidemia: Likely secondary to above  --resolved with IV fluids         Hyponatremia:   -- improved with IV fluids         Incidental finding partially visualized smoothly marginated 10 mm right middle lobe nodule   -- CT chest ordered as outpatient         History of hypertension: Resume home  Hyzaar after dishcharge        History of hyperlipidemia: Resume home statin after discharge        History of anxiety and sleep disorder: Continue home trazodone       Discharge Medications with Med changes:         Review of your medicines      START taking      Dose / Directions   acetaminophen 500 MG tablet  Commonly known as: TYLENOL  Used for: Acute appendicitis with localized peritonitis, without perforation, abscess, or gangrene      Dose: 1,000 mg  Take 2 tablets (1,000 mg) by mouth every 6 hours as needed for mild pain  Refills: 0        CONTINUE these medicines which have NOT CHANGED      Dose / Directions   [place]iz[place] 325 MG Tbef tablet  Generic drug: aspirin effervescent      Dose: 1 tablet  Take 1 tablet by mouth 2 times daily as needed  Refills: 0     benzocaine-menthol 15-3.6 MG lozenge  Commonly known as: CEPACOL  Used for: Viral URI with cough      Dose: 1 lozenge  [BENZOCAINE-MENTHOL (CEPACOL) 15-3.6 MG] Take 1 lozenge by mouth every hour as needed.  Refills: 0     fish oil-omega-3 fatty acids 1000 MG capsule      Dose: 1 g  [FISH OIL-OMEGA-3 FATTY ACIDS (FISH OIL) 300-1,000 MG CAPSULE] Take 1 g by mouth daily.  Refills: 0     losartan-hydrochlorothiazide 100-12.5 MG tablet  Commonly known as: HYZAAR  Used for: Hypertension, unspecified type      Dose: 1 tablet  Take 1 tablet by mouth daily  Refills: 0     MENS 50+ MULTI VITAMIN/MIN PO      Dose: 1 tablet  Take 1 tablet by mouth daily  Refills: 0     pravastatin 10 MG tablet  Commonly known as: PRAVACHOL      Dose: 10 mg  [PRAVASTATIN (PRAVACHOL) 10 MG TABLET] Take 10 mg by mouth at bedtime.  Refills: 0     traZODone 50 MG tablet  Commonly known as: DESYREL      Dose: 50 mg  [TRAZODONE (DESYREL) 50 MG TABLET] Take 50 mg by mouth at bedtime.  Refills: 0           Where to get your medicines      Some of these will need a paper prescription and others can be bought over the counter. Ask your nurse if you have questions.    You don't need a  prescription for these medications    acetaminophen 500 MG tablet                      Discharge Procedure Orders   CT Chest w/o Contrast   Standing Status: Future Standing Exp. Date: 09/03/23     Order Specific Question Answer Comments   Clinical Info for the Interpreting Provider Incidental finding partially visualized smoothly marginated 10 mm right middle lobe nodule    Priority Routine      Reason for your hospital stay   Order Comments: Appendicitis     Follow-up and recommended labs and tests    Order Comments: Follow up with primary care provider, Anibal Chamorro, within 7 days for hospital follow- up.     Activity   Order Comments: Your activity upon discharge: activity as tolerated     Order Specific Question Answer Comments   Is discharge order? Yes      Diet   Order Comments: Follow this diet upon discharge: Regular diet     Order Specific Question Answer Comments   Is discharge order? Yes                 CC:Anibal Chamorro

## 2022-09-25 ENCOUNTER — HEALTH MAINTENANCE LETTER (OUTPATIENT)
Age: 77
End: 2022-09-25

## 2023-01-30 ENCOUNTER — HEALTH MAINTENANCE LETTER (OUTPATIENT)
Age: 78
End: 2023-01-30

## 2023-06-11 ENCOUNTER — APPOINTMENT (OUTPATIENT)
Dept: RADIOLOGY | Facility: HOSPITAL | Age: 78
DRG: 270 | End: 2023-06-11
Attending: PHYSICIAN ASSISTANT
Payer: COMMERCIAL

## 2023-06-11 ENCOUNTER — HOSPITAL ENCOUNTER (INPATIENT)
Facility: CLINIC | Age: 78
LOS: 10 days | Discharge: HOME | DRG: 270 | End: 2023-06-21
Attending: INTERNAL MEDICINE | Admitting: INTERNAL MEDICINE
Payer: COMMERCIAL

## 2023-06-11 ENCOUNTER — HOSPITAL ENCOUNTER (INPATIENT)
Facility: HOSPITAL | Age: 78
LOS: 1 days | Discharge: ANOTHER HOSPITAL | DRG: 270 | End: 2023-06-11
Attending: EMERGENCY MEDICINE | Admitting: EMERGENCY MEDICINE
Payer: COMMERCIAL

## 2023-06-11 ENCOUNTER — APPOINTMENT (OUTPATIENT)
Dept: CARDIOLOGY | Facility: HOSPITAL | Age: 78
DRG: 270 | End: 2023-06-11
Attending: INTERNAL MEDICINE
Payer: COMMERCIAL

## 2023-06-11 ENCOUNTER — APPOINTMENT (OUTPATIENT)
Dept: GENERAL RADIOLOGY | Facility: CLINIC | Age: 78
DRG: 270 | End: 2023-06-11
Attending: STUDENT IN AN ORGANIZED HEALTH CARE EDUCATION/TRAINING PROGRAM
Payer: COMMERCIAL

## 2023-06-11 VITALS
HEIGHT: 69 IN | OXYGEN SATURATION: 99 % | BODY MASS INDEX: 34.07 KG/M2 | SYSTOLIC BLOOD PRESSURE: 64 MMHG | DIASTOLIC BLOOD PRESSURE: 38 MMHG | HEART RATE: 2 BPM | WEIGHT: 230 LBS | RESPIRATION RATE: 14 BRPM | TEMPERATURE: 94.4 F

## 2023-06-11 DIAGNOSIS — R00.1 BRADYCARDIA: ICD-10-CM

## 2023-06-11 DIAGNOSIS — R57.0 CARDIOGENIC SHOCK (H): ICD-10-CM

## 2023-06-11 DIAGNOSIS — I95.9 HYPOTENSION, UNSPECIFIED HYPOTENSION TYPE: ICD-10-CM

## 2023-06-11 DIAGNOSIS — I21.3 ST ELEVATION MYOCARDIAL INFARCTION (STEMI), UNSPECIFIED ARTERY (H): ICD-10-CM

## 2023-06-11 DIAGNOSIS — I24.9 ACS (ACUTE CORONARY SYNDROME) (H): ICD-10-CM

## 2023-06-11 DIAGNOSIS — I10 PRIMARY HYPERTENSION: ICD-10-CM

## 2023-06-11 DIAGNOSIS — E78.5 HYPERLIPIDEMIA, UNSPECIFIED HYPERLIPIDEMIA TYPE: ICD-10-CM

## 2023-06-11 DIAGNOSIS — I48.0 PAROXYSMAL ATRIAL FIBRILLATION (H): ICD-10-CM

## 2023-06-11 DIAGNOSIS — T68.XXXA HYPOTHERMIA, INITIAL ENCOUNTER: ICD-10-CM

## 2023-06-11 DIAGNOSIS — I21.11 ST ELEVATION MYOCARDIAL INFARCTION INVOLVING RIGHT CORONARY ARTERY (H): Primary | ICD-10-CM

## 2023-06-11 DIAGNOSIS — I45.9 HEART BLOCK: ICD-10-CM

## 2023-06-11 DIAGNOSIS — I21.4 NSTEMI (NON-ST ELEVATED MYOCARDIAL INFARCTION) (H): Primary | ICD-10-CM

## 2023-06-11 LAB
ABO/RH(D): NORMAL
ACT BLD: 160 SECONDS (ref 74–150)
ACT BLD: 284 SECONDS (ref 74–150)
ACT BLD: 380 SECONDS (ref 74–150)
ACT BLD: 467 SECONDS (ref 74–150)
ALBUMIN SERPL BCG-MCNC: 3.2 G/DL (ref 3.5–5.2)
ALBUMIN SERPL BCG-MCNC: 3.5 G/DL (ref 3.5–5.2)
ALLEN'S TEST: ABNORMAL
ALLEN'S TEST: ABNORMAL
ALP SERPL-CCNC: 48 U/L (ref 40–129)
ALP SERPL-CCNC: 60 U/L (ref 40–129)
ALT SERPL W P-5'-P-CCNC: 138 U/L (ref 10–50)
ALT SERPL W P-5'-P-CCNC: 162 U/L (ref 10–50)
ANION GAP SERPL CALCULATED.3IONS-SCNC: 15 MMOL/L (ref 7–15)
ANION GAP SERPL CALCULATED.3IONS-SCNC: 18 MMOL/L (ref 7–15)
ANION GAP SERPL CALCULATED.3IONS-SCNC: 18 MMOL/L (ref 7–15)
ANION GAP SERPL CALCULATED.3IONS-SCNC: 21 MMOL/L (ref 7–15)
ANTIBODY SCREEN: NEGATIVE
APTT PPP: 28 SECONDS (ref 22–38)
APTT PPP: >240 SECONDS (ref 22–38)
AST SERPL W P-5'-P-CCNC: 189 U/L (ref 10–50)
AST SERPL W P-5'-P-CCNC: 307 U/L (ref 10–50)
BASE EXCESS BLDA CALC-SCNC: -6.9 MMOL/L (ref -9–1.8)
BASE EXCESS BLDA CALC-SCNC: -7.4 MMOL/L (ref -9–1.8)
BASOPHILS # BLD AUTO: 0.1 10E3/UL (ref 0–0.2)
BASOPHILS # BLD AUTO: 0.1 10E3/UL (ref 0–0.2)
BASOPHILS NFR BLD AUTO: 0 %
BASOPHILS NFR BLD AUTO: 1 %
BILIRUB SERPL-MCNC: 0.2 MG/DL
BILIRUB SERPL-MCNC: 0.3 MG/DL
BLD PROD TYP BPU: NORMAL
BLD PROD TYP BPU: NORMAL
BLOOD COMPONENT TYPE: NORMAL
BLOOD COMPONENT TYPE: NORMAL
BUN SERPL-MCNC: 24.2 MG/DL (ref 8–23)
BUN SERPL-MCNC: 24.3 MG/DL (ref 8–23)
BUN SERPL-MCNC: 25.3 MG/DL (ref 8–23)
BUN SERPL-MCNC: 25.4 MG/DL (ref 8–23)
CA-I BLD-MCNC: 4.9 MG/DL (ref 4.4–5.2)
CALCIUM SERPL-MCNC: 8.9 MG/DL (ref 8.8–10.2)
CALCIUM SERPL-MCNC: 8.9 MG/DL (ref 8.8–10.2)
CALCIUM SERPL-MCNC: 9.3 MG/DL (ref 8.8–10.2)
CALCIUM SERPL-MCNC: 9.5 MG/DL (ref 8.8–10.2)
CHLORIDE SERPL-SCNC: 103 MMOL/L (ref 98–107)
CHLORIDE SERPL-SCNC: 104 MMOL/L (ref 98–107)
CHLORIDE SERPL-SCNC: 105 MMOL/L (ref 98–107)
CHLORIDE SERPL-SCNC: 106 MMOL/L (ref 98–107)
CODING SYSTEM: NORMAL
CODING SYSTEM: NORMAL
CREAT SERPL-MCNC: 1.65 MG/DL (ref 0.67–1.17)
CREAT SERPL-MCNC: 1.7 MG/DL (ref 0.67–1.17)
CREAT SERPL-MCNC: 1.72 MG/DL (ref 0.67–1.17)
CREAT SERPL-MCNC: 1.75 MG/DL (ref 0.67–1.17)
CROSSMATCH: NORMAL
CROSSMATCH: NORMAL
DEPRECATED HCO3 PLAS-SCNC: 14 MMOL/L (ref 22–29)
DEPRECATED HCO3 PLAS-SCNC: 16 MMOL/L (ref 22–29)
EOSINOPHIL # BLD AUTO: 0 10E3/UL (ref 0–0.7)
EOSINOPHIL # BLD AUTO: 0.1 10E3/UL (ref 0–0.7)
EOSINOPHIL NFR BLD AUTO: 0 %
EOSINOPHIL NFR BLD AUTO: 1 %
ERYTHROCYTE [DISTWIDTH] IN BLOOD BY AUTOMATED COUNT: 14.6 % (ref 10–15)
ERYTHROCYTE [DISTWIDTH] IN BLOOD BY AUTOMATED COUNT: 14.6 % (ref 10–15)
ERYTHROCYTE [DISTWIDTH] IN BLOOD BY AUTOMATED COUNT: 14.7 % (ref 10–15)
ERYTHROCYTE [DISTWIDTH] IN BLOOD BY AUTOMATED COUNT: 14.8 % (ref 10–15)
FIBRINOGEN PPP-MCNC: 318 MG/DL (ref 170–490)
GFR SERPL CREATININE-BSD FRML MDRD: 39 ML/MIN/1.73M2
GFR SERPL CREATININE-BSD FRML MDRD: 40 ML/MIN/1.73M2
GFR SERPL CREATININE-BSD FRML MDRD: 41 ML/MIN/1.73M2
GFR SERPL CREATININE-BSD FRML MDRD: 42 ML/MIN/1.73M2
GLUCOSE BLDC GLUCOMTR-MCNC: 253 MG/DL (ref 70–99)
GLUCOSE BLDC GLUCOMTR-MCNC: 293 MG/DL (ref 70–99)
GLUCOSE SERPL-MCNC: 256 MG/DL (ref 70–99)
GLUCOSE SERPL-MCNC: 258 MG/DL (ref 70–99)
GLUCOSE SERPL-MCNC: 271 MG/DL (ref 70–99)
GLUCOSE SERPL-MCNC: 309 MG/DL (ref 70–99)
HCO3 BLD-SCNC: 17 MMOL/L (ref 21–28)
HCO3 BLD-SCNC: 19 MMOL/L (ref 21–28)
HCT VFR BLD AUTO: 36 % (ref 40–53)
HCT VFR BLD AUTO: 36.5 % (ref 40–53)
HCT VFR BLD AUTO: 39.9 % (ref 40–53)
HCT VFR BLD AUTO: 40.8 % (ref 40–53)
HGB BLD-MCNC: 11.9 G/DL (ref 13.3–17.7)
HGB BLD-MCNC: 12.3 G/DL (ref 13.3–17.7)
HGB BLD-MCNC: 13.4 G/DL (ref 13.3–17.7)
HGB BLD-MCNC: 13.4 G/DL (ref 13.3–17.7)
IMM GRANULOCYTES # BLD: 0.1 10E3/UL
IMM GRANULOCYTES # BLD: 0.2 10E3/UL
IMM GRANULOCYTES NFR BLD: 1 %
IMM GRANULOCYTES NFR BLD: 1 %
INR PPP: 1.07 (ref 0.85–1.15)
INR PPP: 1.18 (ref 0.85–1.15)
LACTATE SERPL-SCNC: 3.1 MMOL/L (ref 0.7–2)
LACTATE SERPL-SCNC: 5.3 MMOL/L (ref 0.7–2)
LACTATE SERPL-SCNC: 5.6 MMOL/L (ref 0.7–2)
LACTATE SERPL-SCNC: 7.5 MMOL/L (ref 0.7–2)
LVEF ECHO: NORMAL
LYMPHOCYTES # BLD AUTO: 1.2 10E3/UL (ref 0.8–5.3)
LYMPHOCYTES # BLD AUTO: 1.7 10E3/UL (ref 0.8–5.3)
LYMPHOCYTES NFR BLD AUTO: 13 %
LYMPHOCYTES NFR BLD AUTO: 6 %
MAGNESIUM SERPL-MCNC: 1.6 MG/DL (ref 1.7–2.3)
MAGNESIUM SERPL-MCNC: 2.1 MG/DL (ref 1.7–2.3)
MCH RBC QN AUTO: 28.4 PG (ref 26.5–33)
MCH RBC QN AUTO: 28.5 PG (ref 26.5–33)
MCH RBC QN AUTO: 28.7 PG (ref 26.5–33)
MCH RBC QN AUTO: 29.1 PG (ref 26.5–33)
MCHC RBC AUTO-ENTMCNC: 32.6 G/DL (ref 31.5–36.5)
MCHC RBC AUTO-ENTMCNC: 32.8 G/DL (ref 31.5–36.5)
MCHC RBC AUTO-ENTMCNC: 33.6 G/DL (ref 31.5–36.5)
MCHC RBC AUTO-ENTMCNC: 34.2 G/DL (ref 31.5–36.5)
MCV RBC AUTO: 85 FL (ref 78–100)
MCV RBC AUTO: 85 FL (ref 78–100)
MCV RBC AUTO: 87 FL (ref 78–100)
MCV RBC AUTO: 87 FL (ref 78–100)
MONOCYTES # BLD AUTO: 0.7 10E3/UL (ref 0–1.3)
MONOCYTES # BLD AUTO: 1.2 10E3/UL (ref 0–1.3)
MONOCYTES NFR BLD AUTO: 6 %
MONOCYTES NFR BLD AUTO: 6 %
NEUTROPHILS # BLD AUTO: 10 10E3/UL (ref 1.6–8.3)
NEUTROPHILS # BLD AUTO: 16.8 10E3/UL (ref 1.6–8.3)
NEUTROPHILS NFR BLD AUTO: 78 %
NEUTROPHILS NFR BLD AUTO: 87 %
NRBC # BLD AUTO: 0 10E3/UL
NRBC # BLD AUTO: 0 10E3/UL
NRBC BLD AUTO-RTO: 0 /100
NRBC BLD AUTO-RTO: 0 /100
O2/TOTAL GAS SETTING VFR VENT: 37 %
O2/TOTAL GAS SETTING VFR VENT: 8 %
OXYHGB MFR BLD: 97 % (ref 92–100)
PCO2 BLD: 32 MM HG (ref 35–45)
PCO2 BLD: 36 MM HG (ref 35–45)
PH BLD: 7.32 [PH] (ref 7.35–7.45)
PH BLD: 7.34 [PH] (ref 7.35–7.45)
PHOSPHATE SERPL-MCNC: 3.5 MG/DL (ref 2.5–4.5)
PHOSPHATE SERPL-MCNC: 3.6 MG/DL (ref 2.5–4.5)
PLATELET # BLD AUTO: 196 10E3/UL (ref 150–450)
PLATELET # BLD AUTO: 234 10E3/UL (ref 150–450)
PLATELET # BLD AUTO: 250 10E3/UL (ref 150–450)
PLATELET # BLD AUTO: 255 10E3/UL (ref 150–450)
PO2 BLD: 148 MM HG (ref 80–105)
PO2 BLD: 99 MM HG (ref 80–105)
POTASSIUM SERPL-SCNC: 3.7 MMOL/L (ref 3.4–5.3)
POTASSIUM SERPL-SCNC: 3.8 MMOL/L (ref 3.4–5.3)
PROT SERPL-MCNC: 5.6 G/DL (ref 6.4–8.3)
PROT SERPL-MCNC: 5.8 G/DL (ref 6.4–8.3)
RBC # BLD AUTO: 4.19 10E6/UL (ref 4.4–5.9)
RBC # BLD AUTO: 4.22 10E6/UL (ref 4.4–5.9)
RBC # BLD AUTO: 4.67 10E6/UL (ref 4.4–5.9)
RBC # BLD AUTO: 4.71 10E6/UL (ref 4.4–5.9)
SARS-COV-2 RNA RESP QL NAA+PROBE: NEGATIVE
SODIUM SERPL-SCNC: 137 MMOL/L (ref 136–145)
SODIUM SERPL-SCNC: 138 MMOL/L (ref 136–145)
SODIUM SERPL-SCNC: 138 MMOL/L (ref 136–145)
SODIUM SERPL-SCNC: 139 MMOL/L (ref 136–145)
SPECIMEN EXPIRATION DATE: NORMAL
TROPONIN T SERPL HS-MCNC: 106 NG/L
TROPONIN T SERPL HS-MCNC: 1182 NG/L
TROPONIN T SERPL HS-MCNC: 4705 NG/L
UFH PPP CHRO-ACNC: 0.25 IU/ML
UNIT ABO/RH: NORMAL
UNIT ABO/RH: NORMAL
UNIT NUMBER: NORMAL
UNIT NUMBER: NORMAL
UNIT STATUS: NORMAL
UNIT STATUS: NORMAL
UNIT TYPE ISBT: 5100
UNIT TYPE ISBT: 5100
WBC # BLD AUTO: 12.8 10E3/UL (ref 4–11)
WBC # BLD AUTO: 18.9 10E3/UL (ref 4–11)
WBC # BLD AUTO: 19.5 10E3/UL (ref 4–11)
WBC # BLD AUTO: 22.7 10E3/UL (ref 4–11)

## 2023-06-11 PROCEDURE — 82962 GLUCOSE BLOOD TEST: CPT

## 2023-06-11 PROCEDURE — 82310 ASSAY OF CALCIUM: CPT | Performed by: PHYSICIAN ASSISTANT

## 2023-06-11 PROCEDURE — 93010 ELECTROCARDIOGRAM REPORT: CPT | Performed by: INTERNAL MEDICINE

## 2023-06-11 PROCEDURE — 93005 ELECTROCARDIOGRAM TRACING: CPT | Performed by: EMERGENCY MEDICINE

## 2023-06-11 PROCEDURE — 999N000075 HC STATISTIC IABP MONITORING

## 2023-06-11 PROCEDURE — C1894 INTRO/SHEATH, NON-LASER: HCPCS | Performed by: INTERNAL MEDICINE

## 2023-06-11 PROCEDURE — 99285 EMERGENCY DEPT VISIT HI MDM: CPT | Mod: 25

## 2023-06-11 PROCEDURE — 258N000003 HC RX IP 258 OP 636: Performed by: INTERNAL MEDICINE

## 2023-06-11 PROCEDURE — 85730 THROMBOPLASTIN TIME PARTIAL: CPT | Performed by: PHYSICIAN ASSISTANT

## 2023-06-11 PROCEDURE — C1725 CATH, TRANSLUMIN NON-LASER: HCPCS | Performed by: INTERNAL MEDICINE

## 2023-06-11 PROCEDURE — 85027 COMPLETE CBC AUTOMATED: CPT

## 2023-06-11 PROCEDURE — 3E043XZ INTRODUCTION OF VASOPRESSOR INTO CENTRAL VEIN, PERCUTANEOUS APPROACH: ICD-10-PCS | Performed by: STUDENT IN AN ORGANIZED HEALTH CARE EDUCATION/TRAINING PROGRAM

## 2023-06-11 PROCEDURE — 33967 INSERT I-AORT PERCUT DEVICE: CPT | Performed by: INTERNAL MEDICINE

## 2023-06-11 PROCEDURE — 33210 INSERT ELECTRD/PM CATH SNGL: CPT | Performed by: INTERNAL MEDICINE

## 2023-06-11 PROCEDURE — C1874 STENT, COATED/COV W/DEL SYS: HCPCS | Performed by: INTERNAL MEDICINE

## 2023-06-11 PROCEDURE — 93325 DOPPLER ECHO COLOR FLOW MAPG: CPT | Mod: 26 | Performed by: INTERNAL MEDICINE

## 2023-06-11 PROCEDURE — 83605 ASSAY OF LACTIC ACID: CPT | Performed by: INTERNAL MEDICINE

## 2023-06-11 PROCEDURE — 92941 PRQ TRLML REVSC TOT OCCL AMI: CPT | Mod: RC | Performed by: INTERNAL MEDICINE

## 2023-06-11 PROCEDURE — 84450 TRANSFERASE (AST) (SGOT): CPT

## 2023-06-11 PROCEDURE — 250N000009 HC RX 250: Performed by: INTERNAL MEDICINE

## 2023-06-11 PROCEDURE — 33999 UNLISTED PX CARDIAC SURGERY: CPT | Performed by: INTERNAL MEDICINE

## 2023-06-11 PROCEDURE — 83605 ASSAY OF LACTIC ACID: CPT | Performed by: STUDENT IN AN ORGANIZED HEALTH CARE EDUCATION/TRAINING PROGRAM

## 2023-06-11 PROCEDURE — 250N000011 HC RX IP 250 OP 636: Performed by: PHYSICIAN ASSISTANT

## 2023-06-11 PROCEDURE — C9606 PERC D-E COR REVASC W AMI S: HCPCS | Performed by: INTERNAL MEDICINE

## 2023-06-11 PROCEDURE — 84100 ASSAY OF PHOSPHORUS: CPT | Performed by: INTERNAL MEDICINE

## 2023-06-11 PROCEDURE — 82803 BLOOD GASES ANY COMBINATION: CPT | Performed by: INTERNAL MEDICINE

## 2023-06-11 PROCEDURE — 82330 ASSAY OF CALCIUM: CPT | Performed by: INTERNAL MEDICINE

## 2023-06-11 PROCEDURE — C1757 CATH, THROMBECTOMY/EMBOLECT: HCPCS | Performed by: INTERNAL MEDICINE

## 2023-06-11 PROCEDURE — 93321 DOPPLER ECHO F-UP/LMTD STD: CPT | Mod: 26 | Performed by: INTERNAL MEDICINE

## 2023-06-11 PROCEDURE — 85730 THROMBOPLASTIN TIME PARTIAL: CPT | Performed by: INTERNAL MEDICINE

## 2023-06-11 PROCEDURE — 96365 THER/PROPH/DIAG IV INF INIT: CPT

## 2023-06-11 PROCEDURE — 250N000011 HC RX IP 250 OP 636: Performed by: INTERNAL MEDICINE

## 2023-06-11 PROCEDURE — 85520 HEPARIN ASSAY: CPT | Performed by: STUDENT IN AN ORGANIZED HEALTH CARE EDUCATION/TRAINING PROGRAM

## 2023-06-11 PROCEDURE — C1730 CATH, EP, 19 OR FEW ELECT: HCPCS | Performed by: INTERNAL MEDICINE

## 2023-06-11 PROCEDURE — 85347 COAGULATION TIME ACTIVATED: CPT

## 2023-06-11 PROCEDURE — 82805 BLOOD GASES W/O2 SATURATION: CPT

## 2023-06-11 PROCEDURE — B2101ZZ FLUOROSCOPY OF SINGLE CORONARY ARTERY USING LOW OSMOLAR CONTRAST: ICD-10-PCS | Performed by: INTERNAL MEDICINE

## 2023-06-11 PROCEDURE — 36415 COLL VENOUS BLD VENIPUNCTURE: CPT | Performed by: PHYSICIAN ASSISTANT

## 2023-06-11 PROCEDURE — 86923 COMPATIBILITY TEST ELECTRIC: CPT | Performed by: EMERGENCY MEDICINE

## 2023-06-11 PROCEDURE — 255N000002 HC RX 255 OP 636: Performed by: INTERNAL MEDICINE

## 2023-06-11 PROCEDURE — 85025 COMPLETE CBC W/AUTO DIFF WBC: CPT | Performed by: PHYSICIAN ASSISTANT

## 2023-06-11 PROCEDURE — 250N000012 HC RX MED GY IP 250 OP 636 PS 637

## 2023-06-11 PROCEDURE — 3E043XZ INTRODUCTION OF VASOPRESSOR INTO CENTRAL VEIN, PERCUTANEOUS APPROACH: ICD-10-PCS | Performed by: EMERGENCY MEDICINE

## 2023-06-11 PROCEDURE — 272N000001 HC OR GENERAL SUPPLY STERILE: Performed by: INTERNAL MEDICINE

## 2023-06-11 PROCEDURE — 93454 CORONARY ARTERY ANGIO S&I: CPT | Mod: 26 | Performed by: INTERNAL MEDICINE

## 2023-06-11 PROCEDURE — 85610 PROTHROMBIN TIME: CPT | Performed by: PHYSICIAN ASSISTANT

## 2023-06-11 PROCEDURE — 999N000065 XR CHEST PORT 1 VIEW

## 2023-06-11 PROCEDURE — 250N000011 HC RX IP 250 OP 636: Performed by: EMERGENCY MEDICINE

## 2023-06-11 PROCEDURE — 86901 BLOOD TYPING SEROLOGIC RH(D): CPT | Performed by: EMERGENCY MEDICINE

## 2023-06-11 PROCEDURE — 87635 SARS-COV-2 COVID-19 AMP PRB: CPT | Performed by: STUDENT IN AN ORGANIZED HEALTH CARE EDUCATION/TRAINING PROGRAM

## 2023-06-11 PROCEDURE — 5A1223Z PERFORMANCE OF CARDIAC PACING, CONTINUOUS: ICD-10-PCS | Performed by: INTERNAL MEDICINE

## 2023-06-11 PROCEDURE — 999N000157 HC STATISTIC RCP TIME EA 10 MIN

## 2023-06-11 PROCEDURE — 120N000001 HC R&B MED SURG/OB

## 2023-06-11 PROCEDURE — 250N000011 HC RX IP 250 OP 636: Performed by: STUDENT IN AN ORGANIZED HEALTH CARE EDUCATION/TRAINING PROGRAM

## 2023-06-11 PROCEDURE — 5A02210 ASSISTANCE WITH CARDIAC OUTPUT USING BALLOON PUMP, CONTINUOUS: ICD-10-PCS | Performed by: INTERNAL MEDICINE

## 2023-06-11 PROCEDURE — 71045 X-RAY EXAM CHEST 1 VIEW: CPT | Mod: 26 | Performed by: RADIOLOGY

## 2023-06-11 PROCEDURE — 85610 PROTHROMBIN TIME: CPT | Performed by: INTERNAL MEDICINE

## 2023-06-11 PROCEDURE — 84484 ASSAY OF TROPONIN QUANT: CPT | Performed by: STUDENT IN AN ORGANIZED HEALTH CARE EDUCATION/TRAINING PROGRAM

## 2023-06-11 PROCEDURE — 250N000013 HC RX MED GY IP 250 OP 250 PS 637: Performed by: STUDENT IN AN ORGANIZED HEALTH CARE EDUCATION/TRAINING PROGRAM

## 2023-06-11 PROCEDURE — 99291 CRITICAL CARE FIRST HOUR: CPT | Mod: 25 | Performed by: INTERNAL MEDICINE

## 2023-06-11 PROCEDURE — 80053 COMPREHEN METABOLIC PANEL: CPT | Performed by: INTERNAL MEDICINE

## 2023-06-11 PROCEDURE — 5A02210 ASSISTANCE WITH CARDIAC OUTPUT USING BALLOON PUMP, CONTINUOUS: ICD-10-PCS | Performed by: STUDENT IN AN ORGANIZED HEALTH CARE EDUCATION/TRAINING PROGRAM

## 2023-06-11 PROCEDURE — 86850 RBC ANTIBODY SCREEN: CPT | Performed by: EMERGENCY MEDICINE

## 2023-06-11 PROCEDURE — 84484 ASSAY OF TROPONIN QUANT: CPT | Performed by: PHYSICIAN ASSISTANT

## 2023-06-11 PROCEDURE — 93454 CORONARY ARTERY ANGIO S&I: CPT | Performed by: INTERNAL MEDICINE

## 2023-06-11 PROCEDURE — 3E043PZ INTRODUCTION OF PLATELET INHIBITOR INTO CENTRAL VEIN, PERCUTANEOUS APPROACH: ICD-10-PCS | Performed by: INTERNAL MEDICINE

## 2023-06-11 PROCEDURE — 258N000003 HC RX IP 258 OP 636

## 2023-06-11 PROCEDURE — 93005 ELECTROCARDIOGRAM TRACING: CPT

## 2023-06-11 PROCEDURE — 027035Z DILATION OF CORONARY ARTERY, ONE ARTERY WITH TWO DRUG-ELUTING INTRALUMINAL DEVICES, PERCUTANEOUS APPROACH: ICD-10-PCS | Performed by: INTERNAL MEDICINE

## 2023-06-11 PROCEDURE — 71045 X-RAY EXAM CHEST 1 VIEW: CPT

## 2023-06-11 PROCEDURE — 250N000013 HC RX MED GY IP 250 OP 250 PS 637: Performed by: PHYSICIAN ASSISTANT

## 2023-06-11 PROCEDURE — B211YZZ FLUOROSCOPY OF MULTIPLE CORONARY ARTERIES USING OTHER CONTRAST: ICD-10-PCS | Performed by: INTERNAL MEDICINE

## 2023-06-11 PROCEDURE — 36415 COLL VENOUS BLD VENIPUNCTURE: CPT | Performed by: INTERNAL MEDICINE

## 2023-06-11 PROCEDURE — 85025 COMPLETE CBC W/AUTO DIFF WBC: CPT | Performed by: INTERNAL MEDICINE

## 2023-06-11 PROCEDURE — 02C03ZZ EXTIRPATION OF MATTER FROM CORONARY ARTERY, ONE ARTERY, PERCUTANEOUS APPROACH: ICD-10-PCS | Performed by: INTERNAL MEDICINE

## 2023-06-11 PROCEDURE — 83735 ASSAY OF MAGNESIUM: CPT | Performed by: INTERNAL MEDICINE

## 2023-06-11 PROCEDURE — 36556 INSERT NON-TUNNEL CV CATH: CPT | Performed by: INTERNAL MEDICINE

## 2023-06-11 PROCEDURE — 02HK3JZ INSERTION OF PACEMAKER LEAD INTO RIGHT VENTRICLE, PERCUTANEOUS APPROACH: ICD-10-PCS | Performed by: INTERNAL MEDICINE

## 2023-06-11 PROCEDURE — C1751 CATH, INF, PER/CENT/MIDLINE: HCPCS | Performed by: INTERNAL MEDICINE

## 2023-06-11 PROCEDURE — 999N000185 HC STATISTIC TRANSPORT TIME EA 15 MIN

## 2023-06-11 PROCEDURE — 82805 BLOOD GASES W/O2 SATURATION: CPT | Performed by: INTERNAL MEDICINE

## 2023-06-11 PROCEDURE — C1887 CATHETER, GUIDING: HCPCS | Performed by: INTERNAL MEDICINE

## 2023-06-11 PROCEDURE — 93308 TTE F-UP OR LMTD: CPT

## 2023-06-11 PROCEDURE — 93308 TTE F-UP OR LMTD: CPT | Mod: 26 | Performed by: INTERNAL MEDICINE

## 2023-06-11 PROCEDURE — 200N000002 HC R&B ICU UMMC

## 2023-06-11 PROCEDURE — 258N000003 HC RX IP 258 OP 636: Performed by: STUDENT IN AN ORGANIZED HEALTH CARE EDUCATION/TRAINING PROGRAM

## 2023-06-11 PROCEDURE — 80048 BASIC METABOLIC PNL TOTAL CA: CPT | Performed by: INTERNAL MEDICINE

## 2023-06-11 PROCEDURE — 85027 COMPLETE CBC AUTOMATED: CPT | Performed by: STUDENT IN AN ORGANIZED HEALTH CARE EDUCATION/TRAINING PROGRAM

## 2023-06-11 PROCEDURE — 85384 FIBRINOGEN ACTIVITY: CPT | Performed by: INTERNAL MEDICINE

## 2023-06-11 PROCEDURE — 93325 DOPPLER ECHO COLOR FLOW MAPG: CPT

## 2023-06-11 PROCEDURE — 96375 TX/PRO/DX INJ NEW DRUG ADDON: CPT

## 2023-06-11 PROCEDURE — C1769 GUIDE WIRE: HCPCS | Performed by: INTERNAL MEDICINE

## 2023-06-11 DEVICE — STENT CORONARY DES SYNERGY XD MR US 4.00X16MM H7493941816400: Type: IMPLANTABLE DEVICE | Site: CORONARY | Status: FUNCTIONAL

## 2023-06-11 DEVICE — STENT CORONARY SYNERGY XD MR US 3.5X48MM H7493941848350: Type: IMPLANTABLE DEVICE | Site: CORONARY | Status: FUNCTIONAL

## 2023-06-11 DEVICE — CATH CENTRAL LINE MULTI LUMEN 7FRX20CM CDC-45703-XP1A: Type: IMPLANTABLE DEVICE | Status: FUNCTIONAL

## 2023-06-11 RX ORDER — BISACODYL 5 MG
15 TABLET, DELAYED RELEASE (ENTERIC COATED) ORAL DAILY PRN
Status: DISCONTINUED | OUTPATIENT
Start: 2023-06-11 | End: 2023-06-21 | Stop reason: HOSPADM

## 2023-06-11 RX ORDER — EPTIFIBATIDE 2 MG/ML
INJECTION, SOLUTION INTRAVENOUS CONTINUOUS PRN
Status: DISCONTINUED | OUTPATIENT
Start: 2023-06-11 | End: 2023-06-11 | Stop reason: HOSPADM

## 2023-06-11 RX ORDER — DOPAMINE HYDROCHLORIDE 160 MG/100ML
12 INJECTION, SOLUTION INTRAVENOUS CONTINUOUS
Status: DISCONTINUED | OUTPATIENT
Start: 2023-06-11 | End: 2023-06-13

## 2023-06-11 RX ORDER — PANTOPRAZOLE SODIUM 40 MG/1
40 TABLET, DELAYED RELEASE ORAL
Status: DISCONTINUED | OUTPATIENT
Start: 2023-06-11 | End: 2023-06-19

## 2023-06-11 RX ORDER — LIDOCAINE 40 MG/G
CREAM TOPICAL
Status: DISCONTINUED | OUTPATIENT
Start: 2023-06-11 | End: 2023-06-21 | Stop reason: HOSPADM

## 2023-06-11 RX ORDER — AMOXICILLIN 250 MG
1 CAPSULE ORAL 2 TIMES DAILY
Status: DISCONTINUED | OUTPATIENT
Start: 2023-06-11 | End: 2023-06-21 | Stop reason: HOSPADM

## 2023-06-11 RX ORDER — POLYETHYLENE GLYCOL 3350 17 G/17G
17 POWDER, FOR SOLUTION ORAL DAILY PRN
Status: DISCONTINUED | OUTPATIENT
Start: 2023-06-11 | End: 2023-06-21 | Stop reason: HOSPADM

## 2023-06-11 RX ORDER — ASPIRIN 81 MG/1
324 TABLET, CHEWABLE ORAL ONCE
Status: COMPLETED | OUTPATIENT
Start: 2023-06-11 | End: 2023-06-11

## 2023-06-11 RX ORDER — ONDANSETRON 2 MG/ML
INJECTION INTRAMUSCULAR; INTRAVENOUS
Status: DISCONTINUED | OUTPATIENT
Start: 2023-06-11 | End: 2023-06-11 | Stop reason: HOSPADM

## 2023-06-11 RX ORDER — HEPARIN SODIUM 1000 [USP'U]/ML
INJECTION, SOLUTION INTRAVENOUS; SUBCUTANEOUS
Status: DISCONTINUED | OUTPATIENT
Start: 2023-06-11 | End: 2023-06-11 | Stop reason: HOSPADM

## 2023-06-11 RX ORDER — MAGNESIUM SULFATE HEPTAHYDRATE 40 MG/ML
2 INJECTION, SOLUTION INTRAVENOUS ONCE
Status: COMPLETED | OUTPATIENT
Start: 2023-06-11 | End: 2023-06-11

## 2023-06-11 RX ORDER — IODIXANOL 320 MG/ML
INJECTION, SOLUTION INTRAVASCULAR
Status: DISCONTINUED | OUTPATIENT
Start: 2023-06-11 | End: 2023-06-11 | Stop reason: HOSPADM

## 2023-06-11 RX ORDER — ACETAMINOPHEN 325 MG/1
650 TABLET ORAL EVERY 4 HOURS PRN
Status: DISCONTINUED | OUTPATIENT
Start: 2023-06-11 | End: 2023-06-21 | Stop reason: HOSPADM

## 2023-06-11 RX ORDER — BISACODYL 5 MG
10 TABLET, DELAYED RELEASE (ENTERIC COATED) ORAL DAILY PRN
Status: DISCONTINUED | OUTPATIENT
Start: 2023-06-11 | End: 2023-06-21 | Stop reason: HOSPADM

## 2023-06-11 RX ORDER — BISACODYL 5 MG
5 TABLET, DELAYED RELEASE (ENTERIC COATED) ORAL DAILY PRN
Status: DISCONTINUED | OUTPATIENT
Start: 2023-06-11 | End: 2023-06-21 | Stop reason: HOSPADM

## 2023-06-11 RX ORDER — NITROGLYCERIN 5 MG/ML
VIAL (ML) INTRAVENOUS
Status: DISCONTINUED | OUTPATIENT
Start: 2023-06-11 | End: 2023-06-11 | Stop reason: HOSPADM

## 2023-06-11 RX ORDER — EPTIFIBATIDE 2 MG/ML
INJECTION, SOLUTION INTRAVENOUS
Status: DISCONTINUED | OUTPATIENT
Start: 2023-06-11 | End: 2023-06-11 | Stop reason: HOSPADM

## 2023-06-11 RX ORDER — NICOTINE POLACRILEX 4 MG
15-30 LOZENGE BUCCAL
Status: DISCONTINUED | OUTPATIENT
Start: 2023-06-11 | End: 2023-06-21 | Stop reason: HOSPADM

## 2023-06-11 RX ORDER — SODIUM CHLORIDE 9 MG/ML
INJECTION, SOLUTION INTRAVENOUS CONTINUOUS
Status: DISCONTINUED | OUTPATIENT
Start: 2023-06-11 | End: 2023-06-20

## 2023-06-11 RX ORDER — HEPARIN SODIUM 10000 [USP'U]/100ML
0-5000 INJECTION, SOLUTION INTRAVENOUS CONTINUOUS
Status: DISCONTINUED | OUTPATIENT
Start: 2023-06-11 | End: 2023-06-13

## 2023-06-11 RX ORDER — ACETAMINOPHEN 650 MG/1
650 SUPPOSITORY RECTAL EVERY 4 HOURS PRN
Status: DISCONTINUED | OUTPATIENT
Start: 2023-06-11 | End: 2023-06-21 | Stop reason: HOSPADM

## 2023-06-11 RX ORDER — MAGNESIUM HYDROXIDE/ALUMINUM HYDROXICE/SIMETHICONE 120; 1200; 1200 MG/30ML; MG/30ML; MG/30ML
30 SUSPENSION ORAL EVERY 4 HOURS PRN
Status: DISCONTINUED | OUTPATIENT
Start: 2023-06-11 | End: 2023-06-21 | Stop reason: HOSPADM

## 2023-06-11 RX ORDER — AMOXICILLIN 250 MG
2 CAPSULE ORAL 2 TIMES DAILY
Status: DISCONTINUED | OUTPATIENT
Start: 2023-06-11 | End: 2023-06-21 | Stop reason: HOSPADM

## 2023-06-11 RX ORDER — NITROGLYCERIN 0.4 MG/1
0.4 TABLET SUBLINGUAL EVERY 5 MIN PRN
Status: DISCONTINUED | OUTPATIENT
Start: 2023-06-11 | End: 2023-06-21 | Stop reason: HOSPADM

## 2023-06-11 RX ORDER — HEPARIN SODIUM 5000 [USP'U]/.5ML
5000 INJECTION, SOLUTION INTRAVENOUS; SUBCUTANEOUS EVERY 8 HOURS
Status: DISCONTINUED | OUTPATIENT
Start: 2023-06-11 | End: 2023-06-11

## 2023-06-11 RX ORDER — ASPIRIN 81 MG/1
81 TABLET ORAL DAILY
Status: DISCONTINUED | OUTPATIENT
Start: 2023-06-12 | End: 2023-06-18

## 2023-06-11 RX ORDER — ATROPINE SULFATE 0.1 MG/ML
0.5 INJECTION INTRAVENOUS ONCE
Status: COMPLETED | OUTPATIENT
Start: 2023-06-11 | End: 2023-06-11

## 2023-06-11 RX ORDER — DEXTROSE MONOHYDRATE 25 G/50ML
25-50 INJECTION, SOLUTION INTRAVENOUS
Status: DISCONTINUED | OUTPATIENT
Start: 2023-06-11 | End: 2023-06-21 | Stop reason: HOSPADM

## 2023-06-11 RX ORDER — DOBUTAMINE HYDROCHLORIDE 200 MG/100ML
5 INJECTION INTRAVENOUS CONTINUOUS
Status: DISCONTINUED | OUTPATIENT
Start: 2023-06-11 | End: 2023-06-11

## 2023-06-11 RX ORDER — MAGNESIUM CARB/ALUMINUM HYDROX 105-160MG
148 TABLET,CHEWABLE ORAL
Status: DISCONTINUED | OUTPATIENT
Start: 2023-06-11 | End: 2023-06-11

## 2023-06-11 RX ORDER — HEPARIN SODIUM 5000 [USP'U]/.5ML
5000 INJECTION, SOLUTION INTRAVENOUS; SUBCUTANEOUS EVERY 12 HOURS
Status: DISCONTINUED | OUTPATIENT
Start: 2023-06-11 | End: 2023-06-11

## 2023-06-11 RX ORDER — ACETAMINOPHEN 325 MG/1
650 TABLET ORAL EVERY 4 HOURS PRN
Status: DISCONTINUED | OUTPATIENT
Start: 2023-06-11 | End: 2023-06-11

## 2023-06-11 RX ORDER — ATORVASTATIN CALCIUM 40 MG/1
80 TABLET, FILM COATED ORAL EVERY EVENING
Status: DISCONTINUED | OUTPATIENT
Start: 2023-06-11 | End: 2023-06-21 | Stop reason: HOSPADM

## 2023-06-11 RX ADMIN — EPINEPHRINE 0.01 MCG/KG/MIN: 1 INJECTION INTRAMUSCULAR; INTRAVENOUS; SUBCUTANEOUS at 11:03

## 2023-06-11 RX ADMIN — MAGNESIUM SULFATE IN WATER 2 G: 40 INJECTION, SOLUTION INTRAVENOUS at 18:50

## 2023-06-11 RX ADMIN — TICAGRELOR 180 MG: 90 TABLET ORAL at 18:50

## 2023-06-11 RX ADMIN — DOPAMINE HYDROCHLORIDE 2 MCG/KG/MIN: 160 INJECTION, SOLUTION INTRAVENOUS at 08:39

## 2023-06-11 RX ADMIN — ATROPINE SULFATE 0.5 MG: 0.1 INJECTION INTRAVENOUS at 08:27

## 2023-06-11 RX ADMIN — HEPARIN SODIUM 7500 UNITS: 1000 INJECTION INTRAVENOUS; SUBCUTANEOUS at 08:55

## 2023-06-11 RX ADMIN — TICAGRELOR 180 MG: 90 TABLET ORAL at 08:48

## 2023-06-11 RX ADMIN — PANTOPRAZOLE SODIUM 40 MG: 40 TABLET, DELAYED RELEASE ORAL at 18:50

## 2023-06-11 RX ADMIN — DOPAMINE HYDROCHLORIDE 12 MCG/KG/MIN: 160 INJECTION, SOLUTION INTRAVENOUS at 17:02

## 2023-06-11 RX ADMIN — ASPIRIN 324 MG: 81 TABLET, CHEWABLE ORAL at 08:42

## 2023-06-11 RX ADMIN — DOCUSATE SODIUM 50 MG AND SENNOSIDES 8.6 MG 1 TABLET: 8.6; 5 TABLET, FILM COATED ORAL at 19:23

## 2023-06-11 RX ADMIN — INSULIN ASPART 1 UNITS: 100 INJECTION, SOLUTION INTRAVENOUS; SUBCUTANEOUS at 21:57

## 2023-06-11 RX ADMIN — DOPAMINE HYDROCHLORIDE 8 MCG/KG/MIN: 160 INJECTION, SOLUTION INTRAVENOUS at 09:33

## 2023-06-11 RX ADMIN — EPINEPHRINE 0.02 MCG/KG/MIN: 1 INJECTION INTRAMUSCULAR; INTRAVENOUS; SUBCUTANEOUS at 18:17

## 2023-06-11 RX ADMIN — DOBUTAMINE HYDROCHLORIDE 5 MCG/KG/MIN: 200 INJECTION INTRAVENOUS at 17:02

## 2023-06-11 RX ADMIN — DOPAMINE HYDROCHLORIDE 17 MCG/KG/MIN: 160 INJECTION, SOLUTION INTRAVENOUS at 21:41

## 2023-06-11 RX ADMIN — SODIUM CHLORIDE 500 ML: 9 INJECTION, SOLUTION INTRAVENOUS at 22:26

## 2023-06-11 RX ADMIN — DOPAMINE HYDROCHLORIDE 12 MCG/KG/MIN: 160 INJECTION, SOLUTION INTRAVENOUS at 09:51

## 2023-06-11 RX ADMIN — HEPARIN SODIUM AND DEXTROSE 1200 UNITS/HR: 10000; 5 INJECTION INTRAVENOUS at 17:17

## 2023-06-11 RX ADMIN — DOPAMINE HYDROCHLORIDE 12 MCG/KG/MIN: 160 INJECTION, SOLUTION INTRAVENOUS at 17:51

## 2023-06-11 RX ADMIN — ATORVASTATIN CALCIUM 80 MG: 80 TABLET, FILM COATED ORAL at 19:23

## 2023-06-11 ASSESSMENT — ACTIVITIES OF DAILY LIVING (ADL)
ADLS_ACUITY_SCORE: 35
ADLS_ACUITY_SCORE: 42
ADLS_ACUITY_SCORE: 42
ADLS_ACUITY_SCORE: 39
ADLS_ACUITY_SCORE: 35
ADLS_ACUITY_SCORE: 35
ADLS_ACUITY_SCORE: 43
ADLS_ACUITY_SCORE: 42

## 2023-06-11 NOTE — Clinical Note
Balloon prepped per 's instructions. Pt is already scheduled for EGD/Colon on 1/14/19. Does she need to be moved to the Friday of that week 1/18/19 to make it an exact 10 weeks or can she stay scheduled on 1/14/19? Please advise.    Thanks,  Hermila

## 2023-06-11 NOTE — Clinical Note
Temporary pacemaker Rate= 80bpmPaced mA= 42Pacer wire was captured appropriately, Pacer is set and Demand on Standby

## 2023-06-11 NOTE — ED TRIAGE NOTES
Patient presents here via [facility] for evaluation of bradycardia and hypotension. He woke this morning and passed out, falling to the floor. Family got him up and he passed out again. EMS ekg revealed an inferior MI. Heart rate in in the 30's.

## 2023-06-11 NOTE — Clinical Note
Max pressure = 12 hilton. Total duration = 20 seconds.     Max pressure = 14 hilton. Total duration = 20 seconds.    Balloon reinflated a second time: Max pressure = 14 hilton. Total duration = 20 seconds.

## 2023-06-11 NOTE — Clinical Note
Stent deployed in the middle right coronary artery. Max pressure = 16 hilton. Total duration = 20 seconds.

## 2023-06-11 NOTE — Clinical Note
Max pressure = 14 hilton. Total duration = 12 seconds.     Max pressure = 14 hilton. Total duration = 10 seconds.  Balloon reinflated a fourth time: Max pressure = 14 hilton. Total duration = 8 seconds.

## 2023-06-11 NOTE — Clinical Note
Max pressure = 16 hilton. Total duration = 10 seconds.     Max pressure = 14 hilton. Total duration = 8 seconds.    Balloon reinflated a second time: Max pressure = 14 hilton. Total duration = 8 seconds. Max pressure = 14 hilton. Total duration = 8 seconds.

## 2023-06-11 NOTE — PROGRESS NOTES
Admitted/transferred from: [facility]  Reason for admission/transfer: STEMI/IABP  2 RN skin assessment: completed by Vikki TAO  Result of skin assessment and interventions/actions: No intervention needed  Height, weight, drug calc weight: Done  Patient belongings No belongings w/ patient.  MDRO education added to care plan NA  ?

## 2023-06-11 NOTE — Clinical Note
Max pressure = 12 hilton. Total duration = 12 seconds.     Max pressure = 12 hilton. Total duration = 8 seconds.    Balloon reinflated a second time: Max pressure = 12 hilton. Total duration = 8 seconds.

## 2023-06-11 NOTE — Clinical Note
Max pressure = 14 hilton. Total duration = 8 seconds.     Max pressure = 14 hilton. Total duration = 10 seconds.    Balloon reinflated a second time: Max pressure = 14 hilton. Total duration = 10 seconds.  Balloon reinflated a third time: Max pressure = 14 hilton. Total duration = 14 seconds.

## 2023-06-11 NOTE — Clinical Note
Max pressure = 14 hilton. Total duration = 10 seconds.     Max pressure = 16 hilton. Total duration = 10 seconds.    Balloon reinflated a second time: Max pressure = 16 hilton. Total duration = 10 seconds.  Balloon reinflated a third time: Max pressure = 16 hilton. Total duration = 10 seconds.  Balloon reinflated a fourth time: Max pressure = 16 hilton. Total duration = 10 seconds.  Balloon reinflated a fourth time: Max pressure = 14 hilton. Total durati on = 8 seconds.

## 2023-06-11 NOTE — ED NOTES
Nursing Assessment: Cardiovascular: Noting elevation in inferior leads, rhythm appears to be in 3rd degree block. Patient is pale. He denies chest pain, but does report neck and upper back pain.Skin is cool to touch. Respiratory: Spontaneous respirations. Oxygen per nasal cannula 2 liters. Mentation: Patient is awake and answering questions appropriately.

## 2023-06-11 NOTE — Clinical Note
The first balloon was inserted into the right coronary artery and proximal right coronary artery.Max pressure = 6 hilton. Total duration = 60 seconds.

## 2023-06-11 NOTE — PROGRESS NOTES
[facility]         Intra-Aortic Balloon Pump Admission/Monitoring:       Arrival Date: 6/11/2023  Arrival Time: 1251    Insertion Date: 6/11/2023  Insertion Time: 0957 per OSH cath lab report  Insertion Locations: OSH ([place] cath lab)    Line Assessment:  Site: Left Femoral Artery  Balloon Volume: 50 ml  Catheter Size: 8 Fr  Fiberoptic: YES  Sheath: NO  Helium Tubing: no blood (brown flecks) present in tubing     IABP Settings:  Mode: Auto  Trigger: EKG  Ratio: 1:1  Augmentation %: 100 %  Machine #: [id number]    Patient Parameters:  IABP Heart Rate: 78 (06/11/23 1356)  Assisted Systolic: 87 (06/11/23 1356)  Assisted Diastolic: 51 (06/11/23 1356)  Pump Mean: 80 (06/11/23 1356)  Augmented Diastolic: 133 (06/11/23 1356)  Unassisted Systolic: 104 (06/11/23 1356)  Unassisted Diastolic: 56 (06/11/23 1356)    Austin Hall  ECMO Specialist  6/11/2023 2:01 PM    Edith Carlin RT on 6/11/2023 at 7:36 PM

## 2023-06-11 NOTE — Clinical Note
Max pressure = 16 hilton. Total duration = 24 seconds.     Max pressure = 12 hilton. Total duration = 10 seconds.  Balloon reinflated a fourth time: Max pressure = 12 hilton. Total duration = 10 seconds.

## 2023-06-11 NOTE — Clinical Note
Max pressure = 10 hilton. Total duration = 8 seconds.     Max pressure = 10 hilton. Total duration = 10 seconds.    Balloon reinflated a second time: Max pressure = 10 hilton. Total duration = 10 seconds. Max pressure = 10 hilton. Total duration = 7 seconds.  Balloon reinflated a fourth time: Max pressure = 12 hilton. Total duration = 11 seconds.

## 2023-06-11 NOTE — Clinical Note
Patient education provided. Unable to obtain informed consent due to emergent nature of procedure; however, patient verbally agrees to proceed with procedure.

## 2023-06-11 NOTE — H&P
[facility]    Cardiology ICU History and Physical      HPI:     Kimani Glover is a 77 year old male who was admitted on 6/11/2023 following STEMI and PCI. Patient presented to [facility] ED after near syncope. Patient was reportedly with his family when he fell and almost lost consciousness. EMS was called and he was found to be lethargic with SBP 58 mmHg and bradycardic HR 30s. He was brought to [place] ED where he was found to be bradycardic 32 bpm and hypotensive. EKG showed inferior STEMI. He was started on dopamine and taken to the cath lab for emergent angiography.     Coronary angiogram showed occlusion of dominant RCA. PCI was attempted but was complicated by dissection and no-reflow. Temporary pace maker was placed. He was transferred to [facility] CICU for further management.     On arrival patient hemodynamically stable on 12 dopamine and 0.01 epinephrine. Patient comfortable without chest pain, shortness of breath, lightheadedness, dizziness, fevers, chills.     Medications:   I have reviewed this patient's current medications    No past medical history on file.    No family history on file.  Social History     Socioeconomic History     Marital status: Single     Spouse name: Not on file     Number of children: Not on file     Years of education: Not on file     Highest education level: Not on file   Occupational History     Not on file   Tobacco Use     Smoking status: Never     Smokeless tobacco: Never   Vaping Use     Vaping status: Not on file   Substance and Sexual Activity     Alcohol use: Yes     Alcohol/week: 15.0 standard drinks of alcohol     Drug use: Never     Sexual activity: Yes     Partners: Female     Birth control/protection: Condom   Other Topics Concern     Not on file   Social History Narrative     Not on file     Social Determinants of Health     Financial Resource Strain: Not on file   Food Insecurity: Not on file   Transportation Needs:  Not on file   Physical Activity: Not on file   Stress: Not on file   Social Connections: Not on file   Intimate Partner Violence: Not on file   Housing Stability: Not on file       Review of Systems:   Unable to obtain ROS as patient is intubated and sedated     Physical Exam:   /89 (BP Location: Right arm)   Pulse 77   Temp 96.8  F (36  C) (Axillary)   Resp 19   SpO2 98%      GEN: intubated, sedated   HEENT: no icterus  CV: RRR, normal s1/s2, no murmurs/rubs/s3/s4, no heave.   CHEST: Crackles bilaterally  ABD: soft, NT/ND, NABS  : no flank/suprapubic tenderness  NEURO: intubated sedated  EXTREMITIES: LE pulses bilaterally       Data:     Lab Results   Component Value Date    WBC 18.9 (H) 06/11/2023    WBC 19.5 (H) 06/11/2023    WBC 12.8 (H) 06/11/2023    HGB 12.3 (L) 06/11/2023    HGB 13.4 06/11/2023    HGB 13.4 06/11/2023    HCT 36.0 (L) 06/11/2023    HCT 40.8 06/11/2023    HCT 39.9 (L) 06/11/2023     06/11/2023     06/11/2023     06/11/2023     06/11/2023     06/11/2023     (L) 09/03/2022    POTASSIUM 3.7 06/11/2023    POTASSIUM 3.8 06/11/2023    POTASSIUM 4.3 09/03/2022    CHLORIDE 104 06/11/2023    CHLORIDE 106 06/11/2023    CHLORIDE 104 09/03/2022    CO2 16 (L) 06/11/2023    CO2 16 (L) 06/11/2023    CO2 25 09/03/2022    BUN 24.3 (H) 06/11/2023    BUN 24.2 (H) 06/11/2023    BUN 11 09/03/2022    CR 1.65 (H) 06/11/2023    CR 1.75 (H) 06/11/2023    CR 0.85 09/03/2022     (H) 06/11/2023     (H) 06/11/2023     (H) 06/11/2023     (H) 06/11/2023    AST 18 09/03/2022    AST 21 09/02/2022     (H) 06/11/2023    ALT 20 09/03/2022    ALT 24 09/02/2022    ALKPHOS 60 06/11/2023    ALKPHOS 35 (L) 09/03/2022    ALKPHOS 49 09/02/2022    BILITOTAL 0.3 06/11/2023    BILITOTAL 0.8 09/03/2022    BILITOTAL 1.0 09/02/2022    INR 1.18 (H) 06/11/2023    INR 1.07 06/11/2023       Recent Results (from the past 24 hour(s))   XR Chest Port 1 View     Narrative    EXAM: XR CHEST PORT 1 VIEW  LOCATION: [facility]  DATE/TIME: 2023 8:31 AM CDT    INDICATION: chest pain  COMPARISON: 2023 , 2022      Impression    IMPRESSION: No pleural fluid or pneumothorax.  No airspace disease or edema. Central enlargement of the mediastinum is favored to represent pulmonary artery enlargement, which could be seen with pulmonary hypertension. There are degenerative changes in   the spine and right shoulder.   Echocardiogram Limited   Result Value    LVEF  > 65%    [place] [address]     Name: CUATE ELE  MRN: [id number]  : 1945  Study Date: 2023 11:25 AM  Age: 77 yrs  Gender: Male  Reason For Study: Acute Myocardial Infarction  Referring Physician: Filemon Leavitt  Performed By: ARIA     BSA: 2.2 m2  Height: 69 in  Weight: 230 lb  HR: 87  BP: 89/45 mmHg  ______________________________________________________________________________  Procedure  Limited Portable Echo Adult.  ______________________________________________________________________________  Interpretation Summary     The right ventricle is mild to moderately dilated.  Severely decreased right ventricular systolic function  Left ventricular size, wall motion and function are normal. The ejection  fraction is > 65%.  Trivial pericardial effusion  ______________________________________________________________________________  Left Ventricle  The left ventricle is not well visualized. Left ventricular size, wall motion  and function are normal. The ejection fraction is > 65%. There is normal left  ventricular wall thickness. Regional wall motion abnormalities cannot be  excluded due to limited visualization.     Right Ventricle  The right ventricle is mild to moderately dilated. Severely decreased right  ventricular systolic function. TAPSE is abnormal, which is consistent with  abnormal right ventricular  systolic function. There is a pacemaker lead in the  right ventricle.     Atria  Normal left atrial size. The right atrium is moderate to severely dilated.     Mitral Valve  The mitral valve is not well visualized.     Tricuspid Valve  The tricuspid valve is not well visualized, but is grossly normal. There is  mild to moderate (1-2+) tricuspid regurgitation.     Aortic Valve  Aortic valve leaflets appear normal. There is no evidence of aortic stenosis  or clinically significant aortic regurgitation.     Vessels  The aorta root cannot be assessed. The thoracic aorta cannot be assessed. IVC  diameter <2.1 cm collapsing >50% with sniff suggests a normal RA pressure of 3  mmHg.     Pericardium  Trivial pericardial effusion.     ______________________________________________________________________________  MMode/2D Measurements & Calculations  TAPSE: 1.1 cm     ______________________________________________________________________________  Report approved by: Robert Ta 06/11/2023 12:13 PM         Cardiac Catheterization    Narrative    Acute inferolateral STEMI with cardiogenic shock, RV infarct, due to   totally occluded right coronary artery.  80% lesion in first diagonal but no significant disease in LAD or left   circumflex main trunk vessels.  Complex intervention to right coronary artery with multiple balloon   balloon inflations, intracoronary thrombectomy and drug-eluting stent   implant x2  Patient treated with intravenous dopamine, epinephrine, with placement of   intra-aortic balloon pump, temporary pacemaker wire.     XR Chest Port 1 View    Narrative    EXAM: XR CHEST PORT 1 VIEW  6/11/2023 3:15 PM     HISTORY:  Confirm placement of the IABP       COMPARISON:  None    FINDINGS:   AP portable supine radiograph of the chest. Superior margin of the  IABP marker is positioned approximately 6 cm inferior to the anisa.  Trachea is midline. Low lung volumes. Cardiomediastinal silhouette  and  pulmonary vasculature are within normal limits. No focal airspace  opacity, pleural effusion or appreciable pneumothorax.    No acute osseous abnormality. Visualized upper abdomen is  unremarkable.        Impression    IMPRESSION:   1. Superior margin of the IABP marker is positioned approximately 6 cm  inferior to the anisa. Recommend advancement.  2. No acute airspace disease    I have personally reviewed the examination and initial interpretation  and I agree with the findings.    JEREMY BRIDGES MD         SYSTEM ID:  [id number]   Cardiac Catheterization    Narrative       Dist RCA lesion is 70% stenosed.     RPDA lesion is 50% stenosed.    The RCA is a dominant artery with TIMI2 flow. It is stented from the   ostium to the genu inferius, the stents are patent.   The RCA has a 70% lesion in its distal segment, that does not require   emergency treatment.            Assessment and Plan:     Kimani Glover is a 77 year old male with history of HLD, HTN, obesity who presents with inferior STEMI and bradycardia, PCI complicated by RCA dissection and no reflow, trasnferred here with balloon pump for hemodynamic support and consideration of ECMO.     Neurology: # Awake and Alert      Cardiovascular / Hemodynamics: # Inferior STEMI  # RCA dissection with stenting of proximal to mid RCA  #Non-culprit 80% D1 lesion  # No reflow (DEMARCUS 0)  # IABP in place  TTE: pending   EKG: Inferior ST elevation sinus rhythm  - IABP 1:1 - daily CXR  - Continue ASA 81mg and ticagrelor 90mg BID   - Atorvastatin 80mg daily  - Hold ACE/ARB for now   - Holding beta blocker given shock  - Lipid panel        Pulmonary: #Hypoxemic Respiratory Failure  On 8L oxymask  CXR:   - Wean O2  - Daily CXR   - Consider scheduled duonebs if signs of lung dz     GI and Nutrition: # Shock liver 2/2 cardiogenic shock  No known medical hx.   -  Bowel regimen - senna     #GI Prophylaxis: PPI; Protonix 40 mg daily   Renal, Fluid and Electrolytes: # Acute  Renal Injury, likely 2/2  ATN  # Lactic acidosis - 5.6  - Cr 1.65  - Monitor urine output  - trend lactate   Infectious Disease: # Leukocytosis  Leukocytosis c/w shock  - Monitor for signs of infection given lines, and leukocytosis  - COVID pending     Hematology and Oncology: # Anemia of critical illness  Hgb stable. No e/o bleeding.  - ASA/ticagrelor for PAULINE  - Transfuse for Hgb<7  - DVT PPX: Heparin gtt     Endocrinology: # Hyperglycemia   - No known medical history.    Lines: Mckenzie  R femoral temporary pacemaker  L femoral IABP       Family update by me today: Yes      Code Status: FULL    Adiel Tobin MD   Cardiology Fellow, PGY-5

## 2023-06-11 NOTE — ED PROVIDER NOTES
EMERGENCY DEPARTMENT ENCOUNTER      NAME: Kimani Glover  AGE: 77 year old male  YOB: 1945  MRN: [id number]  EVALUATION DATE & TIME: 2023  8:20 AM    PCP: Anibal Chamorro    ED PROVIDER: Robi Michelle D.O.      Chief Complaint   Patient presents with     Bradycardia       FINAL IMPRESSION:  1. ACS (acute coronary syndrome) (H)    2. ST elevation myocardial infarction (STEMI), unspecified artery (H)    3. Bradycardia    4. Heart block    5. Hypotension, unspecified hypotension type    6. Hypothermia, initial encounter        ED COURSE & MEDICAL DECISION MAKIN:16 AM I met with the patient to gather history and to perform my initial exam. I discussed the plan for care while in the Emergency Department.  8:24 AM Spoke with cardiology who want atropine and dopamine   8:25 AM Spoke with pharmacy to order dopamine.   8:30 AM Reviewed the X-ray which showed not medialstinal widening   8:33 AM Dopamine was administered.   8:39 AM Order for atropine placed.  8:48 AM Dopamine dose was doubled. Previous dose did not increase blood pressure.  8:50 AM Patient become more alert and started speaking more clearly.           Pertinent Labs & Imaging studies reviewed. (See chart for details)  77 year old male presents to the Emergency Department for evaluation of near syncopal episode x2 at home.  Patient's family does report that he never actually fully lost consciousness.  Was somewhat weak and tired, but not have any chest pain.  He did report that he was somewhat short of breath.  Prior to arrival, EMS did call to alert us that he was bradycardic, hypotensive, and had an inferior MI on their EKG.  When he arrived, he was lethargic, with some slurring of his speech secondary to his lethargy.  Heart rate was 32, but he was hypotensive.  Our EKG here did confirm an inferior MI, and I did speak with the cardiac interventionalist.  His recommendation was to attempt atropine and dopamine to improve both blood  pressure and heart rate.  Heart rate did not improve with atropine or dopamine, however he did have mild improvement in his blood pressure, and with this his mentation dramatically improved, he was more alert, and his speech became more clear.  Chest x-ray did not show any evidence of mediastinal widening, and based on history that we are able to get, it was believed that patient had no head injury therefore I did believe it would be appropriate to start the heparin drip.  Additionally aspirin and Brilinta are given.  Patient was taken emergently to the Cath Lab for PCI and potential for percutaneous pacemaker placement.    Medical Decision Making    History:    Supplemental history from: Documented in chart, if applicable    External Record(s) reviewed: Documented in chart, if applicable.    Work Up:    Chart documentation includes differential considered and any EKGs or imaging independently interpreted by provider, where specified.    In additional to work up documented, I considered the following work up: Documented in chart, if applicable.    External consultation:    Discussion of management with another provider: Documented in chart, if applicable    Complicating factors:    Care impacted by chronic illness: Hyperlipidemia and Hypertension    Care affected by social determinants of health: N/A    Disposition considerations: Admit.        At the conclusion of the encounter I discussed the results of all of the tests and the disposition. The questions were answered. The patient or family acknowledged understanding and was agreeable with the care plan.      CRITICAL CARE:  Critical Care  Performed by: FELIPA SOLANO  Authorized by: FELIPA SOLANO  Total critical care time: 40 minutes  Critical care time was exclusive of separately billable procedures and treating other patients.  Critical care was necessary to treat or prevent imminent or life-threatening deterioration of the following conditions:  STEMI  Critical care was time spent personally by me on the following activities: development of treatment plan with patient or surrogate, discussions with consultants, examination of patient, evaluation of patient's response to treatment, obtaining history from patient or surrogate, ordering and performing treatments and interventions, ordering and review of laboratory studies, ordering and review of radiographic studies and re-evaluation of patient's condition, this excludes any separately billable procedures.        HPI    Patient information was obtained from: EMS, patient and son    Use of : N/A        Kimani Glover is a 77 year old male who presents with bradycardia and hypotension.      Per EMS, patient was at home with his family when he passed out and fell at 7:30. His family found hims when they heard him fall and placed him on his bed. He tried to get out of his bed and passed out and fell again. He has no history of falls. EMS took blood pressure which was 58 systolic. Patient was given fluids but no medications. EMS notes he is sleepy but going in and out of alertness.     Per patient: He denies nausea or shortness of breath. Notes pain in neck and upper back.     Per son, patient fell down and when he found him he was 100 coherent. He notes that he speech was heavy but not slurred. Notes that after the first fall the patient wanted to stay on the ground by the took him to lay down in his bed. He tried to get out of bed and fell again.     REVIEW OF SYSTEMS  Limited due to patient lethargy    PAST MEDICAL HISTORY:  History reviewed. No pertinent past medical history.    PAST SURGICAL HISTORY:  Past Surgical History:   Procedure Laterality Date     LAPAROSCOPIC APPENDECTOMY N/A 9/2/2022    Procedure: APPENDECTOMY, LAPAROSCOPIC;  Surgeon: Robi Carmona MD;  Location: [facility] OR         CURRENT MEDICATIONS:    Current Facility-Administered Medications   Medication     DOPamine  "400 mg in 250 mL D5W PERIPHERAL infusion     lidocaine 1 %     Current Outpatient Medications   Medication     acetaminophen (TYLENOL) 500 MG tablet     aspirin effervescent (HEIDE-SELTZER) 325 MG TBEF tablet     benzocaine-menthoL (CEPACOL) 15-3.6 mg     fish oil-omega-3 fatty acids (FISH OIL) 300-1,000 mg capsule     losartan-hydrochlorothiazide (HYZAAR) 100-12.5 MG tablet     Multiple Vitamins-Minerals (MENS 50+ MULTI VITAMIN/MIN PO)     pravastatin (PRAVACHOL) 10 MG tablet     traZODone (DESYREL) 50 MG tablet         ALLERGIES:  No Known Allergies    FAMILY HISTORY:  History reviewed. No pertinent family history.    SOCIAL HISTORY:  Social History     Socioeconomic History     Marital status: Single   Tobacco Use     Smoking status: Never     Smokeless tobacco: Never   Substance and Sexual Activity     Alcohol use: Yes     Alcohol/week: 15.0 standard drinks of alcohol     Drug use: Never     Sexual activity: Yes     Partners: Female     Birth control/protection: Condom       VITALS:  Patient Vitals for the past 24 hrs:   BP Temp Temp src Pulse Resp SpO2 Height Weight   06/11/23 0902 (!) 64/38 -- -- -- -- -- -- --   06/11/23 0850 -- (!) 94.4  F (34.7  C) Rectal (!) 2 14 99 % -- --   06/11/23 0824 (!) 89/45 -- -- (!) 33 15 99 % 1.753 m (5' 9\") 104.3 kg (230 lb)   06/11/23 0820 -- -- -- -- -- -- -- 104.3 kg (229 lb 15 oz)       PHYSICAL EXAM    VITAL SIGNS: BP (!) 64/38   Pulse (!) 2   Temp (!) 94.4  F (34.7  C) (Rectal)   Resp 14   Ht 1.753 m (5' 9\")   Wt 104.3 kg (230 lb)   SpO2 99%   BMI 33.97 kg/m      General Appearance: well-nourished, no acute distress, Lethargic   Head:  Normocephalic, without obvious abnormality, atraumatic  Eyes:  PERRL, conjunctiva/corneas clear, EOM's intact,  ENT:  Lips, mucosa, and tongue normal, membranes are moist without pallor  Neck:  Normal ROM, symmetrical, trachea midline    Cardio:  Regular rate and rhythm, no murmur, rub or gallop, 2+ pulses symmetric in all " extremities. Bradycardia   Pulm:  Clear to auscultation bilaterally, respirations unlabored,  Abdomen:  Soft, non-tender, no rebound or guarding.  Musculoskeletal: no edema, no cyanosis  Integument:  Warm, Dry, No erythema, No rash.    Neurologic:  Lethargic, answering yes/no questions.     LABS  Results for orders placed or performed during the hospital encounter of 06/11/23 (from the past 24 hour(s))   XR Chest Port 1 View    Narrative    EXAM: XR CHEST PORT 1 VIEW  LOCATION: [facility]  DATE/TIME: 6/11/2023 8:31 AM CDT    INDICATION: chest pain  COMPARISON: 05/24/2023 , 09/02/2022      Impression    IMPRESSION: No pleural fluid or pneumothorax.  No airspace disease or edema. Central enlargement of the mediastinum is favored to represent pulmonary artery enlargement, which could be seen with pulmonary hypertension. There are degenerative changes in   the spine and right shoulder.   CBC with platelets differential    Narrative    The following orders were created for panel order CBC with platelets differential.  Procedure                               Abnormality         Status                     ---------                               -----------         ------                     CBC with platelets and d...[[id number]]  Abnormal            Final result                 Please view results for these tests on the individual orders.   INR   Result Value Ref Range    INR 1.07 0.85 - 1.15   Partial thromboplastin time   Result Value Ref Range    aPTT 28 22 - 38 Seconds   Basic metabolic panel   Result Value Ref Range    Sodium 137 136 - 145 mmol/L    Potassium 3.8 3.4 - 5.3 mmol/L    Chloride 106 98 - 107 mmol/L    Carbon Dioxide (CO2) 16 (L) 22 - 29 mmol/L    Anion Gap 15 7 - 15 mmol/L    Urea Nitrogen 24.2 (H) 8.0 - 23.0 mg/dL    Creatinine 1.75 (H) 0.67 - 1.17 mg/dL    Calcium 9.5 8.8 - 10.2 mg/dL    Glucose 258 (H) 70 - 99 mg/dL    GFR Estimate 39 (L) >60 mL/min/1.73m2   Troponin T, High  Sensitivity   Result Value Ref Range    Troponin T, High Sensitivity 106 (HH) <=22 ng/L   CBC with platelets and differential   Result Value Ref Range    WBC Count 12.8 (H) 4.0 - 11.0 10e3/uL    RBC Count 4.67 4.40 - 5.90 10e6/uL    Hemoglobin 13.4 13.3 - 17.7 g/dL    Hematocrit 39.9 (L) 40.0 - 53.0 %    MCV 85 78 - 100 fL    MCH 28.7 26.5 - 33.0 pg    MCHC 33.6 31.5 - 36.5 g/dL    RDW 14.7 10.0 - 15.0 %    Platelet Count 250 150 - 450 10e3/uL    % Neutrophils 78 %    % Lymphocytes 13 %    % Monocytes 6 %    % Eosinophils 1 %    % Basophils 1 %    % Immature Granulocytes 1 %    NRBCs per 100 WBC 0 <1 /100    Absolute Neutrophils 10.0 (H) 1.6 - 8.3 10e3/uL    Absolute Lymphocytes 1.7 0.8 - 5.3 10e3/uL    Absolute Monocytes 0.7 0.0 - 1.3 10e3/uL    Absolute Eosinophils 0.1 0.0 - 0.7 10e3/uL    Absolute Basophils 0.1 0.0 - 0.2 10e3/uL    Absolute Immature Granulocytes 0.1 <=0.4 10e3/uL    Absolute NRBCs 0.0 10e3/uL         RADIOLOGY  XR Chest Port 1 View   Final Result   IMPRESSION: No pleural fluid or pneumothorax.  No airspace disease or edema. Central enlargement of the mediastinum is favored to represent pulmonary artery enlargement, which could be seen with pulmonary hypertension. There are degenerative changes in    the spine and right shoulder.      Cardiac Catheterization    (Results Pending)        I have independently interpreted the above image, no significant mediastinal widening or pneumothorax on chest x-ray. See radiology report for detail.    EKG:    Rate: 33 bpm  Rhythm: Junctional bradycardia  Axis: Normal  Interval: Normal  Conduction: Normal  QRS: Normal  ST: Elevations in the inferior leads as well as V4, V5, V6 depression lead aVL and aVR  T-wave: Normal  QT: Not prolonged  Comparison EKG: ST elevations were not present on 2 September 2022  Impression: STEMI  I have independently reviewed and interpreted today's EKG, pending Cardiologist read          MEDICATIONS GIVEN IN THE  EMERGENCY:  Medications   DOPamine 400 mg in 250 mL D5W PERIPHERAL infusion (12 mcg/kg/min × 104.3 kg Intravenous $New Bag 6/11/23 0951)   lidocaine 1 % (10 mLs Infiltration $Given 6/11/23 0952)   aspirin (ASA) chewable tablet 324 mg (324 mg Oral $Given 6/11/23 0842)   ticagrelor (BRILINTA) tablet 180 mg (180 mg Oral $Given 6/11/23 0848)   heparin (porcine) injection 1000 units/mL (rounds in 500 unit increments) (7,500 Units Intravenous $Given 6/11/23 0855)   atropine injection 0.5 mg (0.5 mg Intravenous $Given 6/11/23 0827)       NEW PRESCRIPTIONS STARTED AT TODAY'S ER VISIT  New Prescriptions    No medications on file        I, Bill Mary, am serving as a scribe to document services personally performed by Robi Michelle D.O., based on my observations and the provider's statements to me.  I, Robi Michelle D.O., attest that Bill Mary is acting in a scribe capacity, has observed my performance of the services and has documented them in accordance with my direction.     Robi Michelle D.O.  Emergency Medicine  [facility] EMERGENCY DEPARTMENT  [address] [address] [phone]  Dept: [phone]     Robi Michelle,   06/11/23 1015

## 2023-06-11 NOTE — Clinical Note
Max pressure = 10 hilton. Total duration = 8 seconds.     Max pressure = 10 hilton. Total duration = 6 seconds.  Balloon reinflated a fourth time: Max pressure = 10 hilton. Total duration = 8 seconds.

## 2023-06-11 NOTE — Clinical Note
Max pressure = 12 hilton. Total duration = 10 seconds.     Max pressure = 10 hilton. Total duration = 8 seconds.    Balloon reinflated a second time: Max pressure = 10 hilton. Total duration = 8 seconds. Max pressure = 10 hilton. Total duration = 10 seconds.  Balloon reinflated a fourth time:

## 2023-06-12 ENCOUNTER — APPOINTMENT (OUTPATIENT)
Dept: ULTRASOUND IMAGING | Facility: CLINIC | Age: 78
DRG: 270 | End: 2023-06-12
Payer: COMMERCIAL

## 2023-06-12 ENCOUNTER — APPOINTMENT (OUTPATIENT)
Dept: CARDIOLOGY | Facility: CLINIC | Age: 78
DRG: 270 | End: 2023-06-12
Attending: STUDENT IN AN ORGANIZED HEALTH CARE EDUCATION/TRAINING PROGRAM
Payer: COMMERCIAL

## 2023-06-12 ENCOUNTER — APPOINTMENT (OUTPATIENT)
Dept: GENERAL RADIOLOGY | Facility: CLINIC | Age: 78
DRG: 270 | End: 2023-06-12
Attending: INTERNAL MEDICINE
Payer: COMMERCIAL

## 2023-06-12 LAB
ALBUMIN SERPL BCG-MCNC: 3.1 G/DL (ref 3.5–5.2)
ALBUMIN SERPL BCG-MCNC: 3.2 G/DL (ref 3.5–5.2)
ALBUMIN SERPL BCG-MCNC: 3.3 G/DL (ref 3.5–5.2)
ALLEN'S TEST: ABNORMAL
ALLEN'S TEST: ABNORMAL
ALP SERPL-CCNC: 46 U/L (ref 40–129)
ALT SERPL W P-5'-P-CCNC: 122 U/L (ref 10–50)
ALT SERPL W P-5'-P-CCNC: 124 U/L (ref 10–50)
ALT SERPL W P-5'-P-CCNC: 136 U/L (ref 10–50)
ANION GAP SERPL CALCULATED.3IONS-SCNC: 11 MMOL/L (ref 7–15)
ANION GAP SERPL CALCULATED.3IONS-SCNC: 13 MMOL/L (ref 7–15)
ANION GAP SERPL CALCULATED.3IONS-SCNC: 19 MMOL/L (ref 7–15)
ANION GAP SERPL CALCULATED.3IONS-SCNC: 20 MMOL/L (ref 7–15)
AST SERPL W P-5'-P-CCNC: 329 U/L (ref 10–50)
AST SERPL W P-5'-P-CCNC: 339 U/L (ref 10–50)
AST SERPL W P-5'-P-CCNC: 344 U/L (ref 10–50)
ATRIAL RATE - MUSE: NORMAL BPM
BASE EXCESS BLDA CALC-SCNC: -12.1 MMOL/L
BASE EXCESS BLDA CALC-SCNC: -14 MMOL/L
BASE EXCESS BLDA CALC-SCNC: -5.5 MMOL/L (ref -9–1.8)
BASE EXCESS BLDA CALC-SCNC: -8.4 MMOL/L (ref -9–1.8)
BASE EXCESS BLDV CALC-SCNC: -0.4 MMOL/L (ref -7.7–1.9)
BASE EXCESS BLDV CALC-SCNC: -0.4 MMOL/L (ref -7.7–1.9)
BASE EXCESS BLDV CALC-SCNC: -4 MMOL/L (ref -7.7–1.9)
BASE EXCESS BLDV CALC-SCNC: -6.9 MMOL/L (ref -7.7–1.9)
BASE EXCESS BLDV CALC-SCNC: -8.3 MMOL/L
BASE EXCESS BLDV CALC-SCNC: 1 MMOL/L (ref -7.7–1.9)
BILIRUB SERPL-MCNC: 0.2 MG/DL
BILIRUB SERPL-MCNC: 0.3 MG/DL
BILIRUB SERPL-MCNC: 0.4 MG/DL
BUN SERPL-MCNC: 26.4 MG/DL (ref 8–23)
BUN SERPL-MCNC: 28.5 MG/DL (ref 8–23)
BUN SERPL-MCNC: 30.8 MG/DL (ref 8–23)
BUN SERPL-MCNC: 31.4 MG/DL (ref 8–23)
CA-I BLD-MCNC: 4.7 MG/DL (ref 4.4–5.2)
CALCIUM SERPL-MCNC: 8.6 MG/DL (ref 8.8–10.2)
CALCIUM SERPL-MCNC: 8.9 MG/DL (ref 8.8–10.2)
CALCIUM SERPL-MCNC: 9.1 MG/DL (ref 8.8–10.2)
CALCIUM SERPL-MCNC: 9.1 MG/DL (ref 8.8–10.2)
CHLORIDE SERPL-SCNC: 101 MMOL/L (ref 98–107)
CHLORIDE SERPL-SCNC: 101 MMOL/L (ref 98–107)
CHLORIDE SERPL-SCNC: 102 MMOL/L (ref 98–107)
CHLORIDE SERPL-SCNC: 104 MMOL/L (ref 98–107)
COHGB MFR BLD: 90 % (ref 95–96)
COHGB MFR BLD: 94 % (ref 95–96)
CREAT SERPL-MCNC: 1.83 MG/DL (ref 0.67–1.17)
CREAT SERPL-MCNC: 2.03 MG/DL (ref 0.67–1.17)
CREAT SERPL-MCNC: 2.04 MG/DL (ref 0.67–1.17)
CREAT SERPL-MCNC: 2.16 MG/DL (ref 0.67–1.17)
DEPRECATED HCO3 PLAS-SCNC: 14 MMOL/L (ref 22–29)
DEPRECATED HCO3 PLAS-SCNC: 15 MMOL/L (ref 22–29)
DEPRECATED HCO3 PLAS-SCNC: 21 MMOL/L (ref 22–29)
DEPRECATED HCO3 PLAS-SCNC: 22 MMOL/L (ref 22–29)
DIASTOLIC BLOOD PRESSURE - MUSE: NORMAL MMHG
ERYTHROCYTE [DISTWIDTH] IN BLOOD BY AUTOMATED COUNT: 15 % (ref 10–15)
GFR SERPL CREATININE-BSD FRML MDRD: 30 ML/MIN/1.73M2
GFR SERPL CREATININE-BSD FRML MDRD: 33 ML/MIN/1.73M2
GFR SERPL CREATININE-BSD FRML MDRD: 33 ML/MIN/1.73M2
GFR SERPL CREATININE-BSD FRML MDRD: 37 ML/MIN/1.73M2
GLUCOSE BLDC GLUCOMTR-MCNC: 128 MG/DL (ref 70–99)
GLUCOSE BLDC GLUCOMTR-MCNC: 132 MG/DL (ref 70–99)
GLUCOSE BLDC GLUCOMTR-MCNC: 132 MG/DL (ref 70–99)
GLUCOSE BLDC GLUCOMTR-MCNC: 138 MG/DL (ref 70–99)
GLUCOSE BLDC GLUCOMTR-MCNC: 142 MG/DL (ref 70–99)
GLUCOSE BLDC GLUCOMTR-MCNC: 143 MG/DL (ref 70–99)
GLUCOSE BLDC GLUCOMTR-MCNC: 144 MG/DL (ref 70–99)
GLUCOSE BLDC GLUCOMTR-MCNC: 145 MG/DL (ref 70–99)
GLUCOSE BLDC GLUCOMTR-MCNC: 158 MG/DL (ref 70–99)
GLUCOSE BLDC GLUCOMTR-MCNC: 171 MG/DL (ref 70–99)
GLUCOSE BLDC GLUCOMTR-MCNC: 213 MG/DL (ref 70–99)
GLUCOSE BLDC GLUCOMTR-MCNC: 226 MG/DL (ref 70–99)
GLUCOSE BLDC GLUCOMTR-MCNC: 269 MG/DL (ref 70–99)
GLUCOSE BLDC GLUCOMTR-MCNC: 270 MG/DL (ref 70–99)
GLUCOSE BLDC GLUCOMTR-MCNC: 304 MG/DL (ref 70–99)
GLUCOSE SERPL-MCNC: 136 MG/DL (ref 70–99)
GLUCOSE SERPL-MCNC: 149 MG/DL (ref 70–99)
GLUCOSE SERPL-MCNC: 270 MG/DL (ref 70–99)
GLUCOSE SERPL-MCNC: 316 MG/DL (ref 70–99)
HBA1C MFR BLD: 5.6 %
HCO3 BLD-SCNC: 16 MMOL/L (ref 21–28)
HCO3 BLD-SCNC: 18 MMOL/L (ref 21–28)
HCO3 BLDA-SCNC: 14 MMOL/L (ref 23–29)
HCO3 BLDA-SCNC: 15 MMOL/L (ref 23–29)
HCO3 BLDV-SCNC: 18 MMOL/L (ref 24–30)
HCO3 BLDV-SCNC: 19 MMOL/L (ref 21–28)
HCO3 BLDV-SCNC: 21 MMOL/L (ref 21–28)
HCO3 BLDV-SCNC: 24 MMOL/L (ref 21–28)
HCT VFR BLD AUTO: 34.5 % (ref 40–53)
HGB BLD-MCNC: 11.4 G/DL (ref 13.3–17.7)
INTERPRETATION ECG - MUSE: NORMAL
LACTATE SERPL-SCNC: 1.7 MMOL/L (ref 0.7–2)
LACTATE SERPL-SCNC: 2.2 MMOL/L (ref 0.7–2)
LACTATE SERPL-SCNC: 2.2 MMOL/L (ref 0.7–2)
LACTATE SERPL-SCNC: 6.6 MMOL/L (ref 0.7–2)
LACTATE SERPL-SCNC: 8.8 MMOL/L (ref 0.7–2)
LVEF ECHO: NORMAL
MAGNESIUM SERPL-MCNC: 2.1 MG/DL (ref 1.7–2.3)
MAGNESIUM SERPL-MCNC: 2.1 MG/DL (ref 1.7–2.3)
MCH RBC QN AUTO: 28.6 PG (ref 26.5–33)
MCHC RBC AUTO-ENTMCNC: 33 G/DL (ref 31.5–36.5)
MCV RBC AUTO: 87 FL (ref 78–100)
MRSA DNA SPEC QL NAA+PROBE: NEGATIVE
O2/TOTAL GAS SETTING VFR VENT: 0 %
O2/TOTAL GAS SETTING VFR VENT: 37 %
OXYHGB MFR BLD: 96 % (ref 92–100)
OXYHGB MFR BLD: 97 % (ref 92–100)
OXYHGB MFR BLDV: 41 % (ref 70–75)
OXYHGB MFR BLDV: 45 % (ref 70–75)
OXYHGB MFR BLDV: 53 % (ref 70–75)
OXYHGB MFR BLDV: 57 % (ref 70–75)
OXYHGB MFR BLDV: 61 % (ref 70–75)
P AXIS - MUSE: NORMAL DEGREES
PCO2 BLD: 28 MM HG (ref 35–45)
PCO2 BLD: 29 MM HG (ref 35–45)
PCO2 BLDA: 39 MM HG (ref 35–45)
PCO2 BLDA: 42 MM HG (ref 35–45)
PCO2 BLDV: 32 MM HG (ref 40–50)
PCO2 BLDV: 37 MM HG (ref 40–50)
PCO2 BLDV: 37 MM HG (ref 40–50)
PCO2 BLDV: 38 MM HG (ref 35–50)
PCO2 BLDV: 38 MM HG (ref 40–50)
PCO2 BLDV: 39 MM HG (ref 40–50)
PH BLD: 7.36 [PH] (ref 7.35–7.45)
PH BLD: 7.4 [PH] (ref 7.35–7.45)
PH BLDA: 7.18 [PH] (ref 7.37–7.44)
PH BLDA: 7.19 [PH] (ref 7.37–7.44)
PH BLDV: 7.29 [PH] (ref 7.35–7.45)
PH BLDV: 7.32 [PH] (ref 7.32–7.43)
PH BLDV: 7.37 [PH] (ref 7.32–7.43)
PH BLDV: 7.4 [PH] (ref 7.32–7.43)
PH BLDV: 7.41 [PH] (ref 7.32–7.43)
PH BLDV: 7.48 [PH] (ref 7.32–7.43)
PHOSPHATE SERPL-MCNC: 3.9 MG/DL (ref 2.5–4.5)
PLATELET # BLD AUTO: 186 10E3/UL (ref 150–450)
PO2 BLD: 106 MM HG (ref 80–105)
PO2 BLD: 92 MM HG (ref 80–105)
PO2 BLDA: 64 MM HG (ref 75–85)
PO2 BLDA: 75 MM HG (ref 75–85)
PO2 BLDV: 24 MM HG (ref 25–47)
PO2 BLDV: 26 MM HG (ref 25–47)
PO2 BLDV: 29 MM HG (ref 25–47)
PO2 BLDV: 30 MM HG (ref 25–47)
PO2 BLDV: 35 MM HG (ref 25–47)
PO2 BLDV: 80 MM HG (ref 25–47)
POTASSIUM SERPL-SCNC: 3.7 MMOL/L (ref 3.4–5.3)
POTASSIUM SERPL-SCNC: 3.8 MMOL/L (ref 3.4–5.3)
POTASSIUM SERPL-SCNC: 3.9 MMOL/L (ref 3.4–5.3)
POTASSIUM SERPL-SCNC: 3.9 MMOL/L (ref 3.4–5.3)
PR INTERVAL - MUSE: NORMAL MS
PROCALCITONIN SERPL IA-MCNC: 0.92 NG/ML
PROT SERPL-MCNC: 5.5 G/DL (ref 6.4–8.3)
QRS DURATION - MUSE: 82 MS
QT - MUSE: 366 MS
QTC - MUSE: 430 MS
R AXIS - MUSE: -8 DEGREES
RBC # BLD AUTO: 3.98 10E6/UL (ref 4.4–5.9)
SA TARGET DNA: NEGATIVE
SATV LHE POCT: 94.9 % (ref 70–75)
SODIUM SERPL-SCNC: 134 MMOL/L (ref 136–145)
SODIUM SERPL-SCNC: 135 MMOL/L (ref 136–145)
SODIUM SERPL-SCNC: 136 MMOL/L (ref 136–145)
SODIUM SERPL-SCNC: 138 MMOL/L (ref 136–145)
SYSTOLIC BLOOD PRESSURE - MUSE: NORMAL MMHG
T AXIS - MUSE: 55 DEGREES
UFH PPP CHRO-ACNC: 0.3 IU/ML
VENTRICULAR RATE- MUSE: 83 BPM
WBC # BLD AUTO: 20.8 10E3/UL (ref 4–11)

## 2023-06-12 PROCEDURE — 83605 ASSAY OF LACTIC ACID: CPT | Performed by: STUDENT IN AN ORGANIZED HEALTH CARE EDUCATION/TRAINING PROGRAM

## 2023-06-12 PROCEDURE — 83735 ASSAY OF MAGNESIUM: CPT | Performed by: INTERNAL MEDICINE

## 2023-06-12 PROCEDURE — 85520 HEPARIN ASSAY: CPT | Performed by: INTERNAL MEDICINE

## 2023-06-12 PROCEDURE — 82330 ASSAY OF CALCIUM: CPT | Performed by: INTERNAL MEDICINE

## 2023-06-12 PROCEDURE — 250N000013 HC RX MED GY IP 250 OP 250 PS 637: Performed by: STUDENT IN AN ORGANIZED HEALTH CARE EDUCATION/TRAINING PROGRAM

## 2023-06-12 PROCEDURE — 250N000011 HC RX IP 250 OP 636: Performed by: STUDENT IN AN ORGANIZED HEALTH CARE EDUCATION/TRAINING PROGRAM

## 2023-06-12 PROCEDURE — 71045 X-RAY EXAM CHEST 1 VIEW: CPT | Mod: 26 | Performed by: RADIOLOGY

## 2023-06-12 PROCEDURE — 93325 DOPPLER ECHO COLOR FLOW MAPG: CPT

## 2023-06-12 PROCEDURE — 250N000009 HC RX 250

## 2023-06-12 PROCEDURE — 99291 CRITICAL CARE FIRST HOUR: CPT | Mod: 25

## 2023-06-12 PROCEDURE — 82805 BLOOD GASES W/O2 SATURATION: CPT | Performed by: NURSE PRACTITIONER

## 2023-06-12 PROCEDURE — 999N000075 HC STATISTIC IABP MONITORING

## 2023-06-12 PROCEDURE — 999N000015 HC STATISTIC ARTERIAL MONITORING DAILY

## 2023-06-12 PROCEDURE — 71045 X-RAY EXAM CHEST 1 VIEW: CPT

## 2023-06-12 PROCEDURE — 87086 URINE CULTURE/COLONY COUNT: CPT | Performed by: NURSE PRACTITIONER

## 2023-06-12 PROCEDURE — 999N000155 HC STATISTIC RAPCV CVP MONITORING

## 2023-06-12 PROCEDURE — 93925 LOWER EXTREMITY STUDY: CPT

## 2023-06-12 PROCEDURE — 85027 COMPLETE CBC AUTOMATED: CPT | Performed by: STUDENT IN AN ORGANIZED HEALTH CARE EDUCATION/TRAINING PROGRAM

## 2023-06-12 PROCEDURE — 250N000011 HC RX IP 250 OP 636

## 2023-06-12 PROCEDURE — 84145 PROCALCITONIN (PCT): CPT | Performed by: NURSE PRACTITIONER

## 2023-06-12 PROCEDURE — 83036 HEMOGLOBIN GLYCOSYLATED A1C: CPT

## 2023-06-12 PROCEDURE — 84100 ASSAY OF PHOSPHORUS: CPT | Performed by: INTERNAL MEDICINE

## 2023-06-12 PROCEDURE — 250N000013 HC RX MED GY IP 250 OP 250 PS 637: Performed by: INTERNAL MEDICINE

## 2023-06-12 PROCEDURE — 200N000002 HC R&B ICU UMMC

## 2023-06-12 PROCEDURE — 93325 DOPPLER ECHO COLOR FLOW MAPG: CPT | Mod: 26 | Performed by: INTERNAL MEDICINE

## 2023-06-12 PROCEDURE — 250N000013 HC RX MED GY IP 250 OP 250 PS 637

## 2023-06-12 PROCEDURE — 93321 DOPPLER ECHO F-UP/LMTD STD: CPT | Mod: 26 | Performed by: INTERNAL MEDICINE

## 2023-06-12 PROCEDURE — 82805 BLOOD GASES W/O2 SATURATION: CPT | Performed by: INTERNAL MEDICINE

## 2023-06-12 PROCEDURE — 87641 MR-STAPH DNA AMP PROBE: CPT | Performed by: NURSE PRACTITIONER

## 2023-06-12 PROCEDURE — 36415 COLL VENOUS BLD VENIPUNCTURE: CPT | Performed by: INTERNAL MEDICINE

## 2023-06-12 PROCEDURE — 250N000011 HC RX IP 250 OP 636: Performed by: NURSE PRACTITIONER

## 2023-06-12 PROCEDURE — 93308 TTE F-UP OR LMTD: CPT | Mod: 26 | Performed by: INTERNAL MEDICINE

## 2023-06-12 PROCEDURE — 93925 LOWER EXTREMITY STUDY: CPT | Mod: 26 | Performed by: RADIOLOGY

## 2023-06-12 PROCEDURE — 255N000002 HC RX 255 OP 636: Performed by: INTERNAL MEDICINE

## 2023-06-12 PROCEDURE — 83605 ASSAY OF LACTIC ACID: CPT | Performed by: INTERNAL MEDICINE

## 2023-06-12 PROCEDURE — C8924 2D TTE W OR W/O FOL W/CON,FU: HCPCS

## 2023-06-12 PROCEDURE — 84450 TRANSFERASE (AST) (SGOT): CPT

## 2023-06-12 PROCEDURE — 82805 BLOOD GASES W/O2 SATURATION: CPT

## 2023-06-12 PROCEDURE — 80053 COMPREHEN METABOLIC PANEL: CPT | Performed by: INTERNAL MEDICINE

## 2023-06-12 PROCEDURE — 87040 BLOOD CULTURE FOR BACTERIA: CPT | Performed by: INTERNAL MEDICINE

## 2023-06-12 RX ORDER — FUROSEMIDE 10 MG/ML
40 INJECTION INTRAMUSCULAR; INTRAVENOUS ONCE
Status: COMPLETED | OUTPATIENT
Start: 2023-06-12 | End: 2023-06-12

## 2023-06-12 RX ORDER — PIPERACILLIN SODIUM, TAZOBACTAM SODIUM 3; .375 G/15ML; G/15ML
3.38 INJECTION, POWDER, LYOPHILIZED, FOR SOLUTION INTRAVENOUS EVERY 6 HOURS
Status: DISCONTINUED | OUTPATIENT
Start: 2023-06-12 | End: 2023-06-16

## 2023-06-12 RX ORDER — NOREPINEPHRINE BITARTRATE 0.06 MG/ML
.01-.6 INJECTION, SOLUTION INTRAVENOUS CONTINUOUS
Status: DISCONTINUED | OUTPATIENT
Start: 2023-06-12 | End: 2023-06-13

## 2023-06-12 RX ORDER — METHOCARBAMOL 500 MG/1
500 TABLET, FILM COATED ORAL EVERY 6 HOURS PRN
Status: DISCONTINUED | OUTPATIENT
Start: 2023-06-12 | End: 2023-06-14

## 2023-06-12 RX ORDER — IOPAMIDOL 755 MG/ML
INJECTION, SOLUTION INTRAVASCULAR
Status: DISCONTINUED | OUTPATIENT
Start: 2023-06-11 | End: 2023-06-19

## 2023-06-12 RX ORDER — POTASSIUM CHLORIDE 1.5 G/1.58G
20 POWDER, FOR SOLUTION ORAL ONCE
Status: COMPLETED | OUTPATIENT
Start: 2023-06-12 | End: 2023-06-12

## 2023-06-12 RX ORDER — PIPERACILLIN SODIUM, TAZOBACTAM SODIUM 3; .375 G/15ML; G/15ML
3.38 INJECTION, POWDER, LYOPHILIZED, FOR SOLUTION INTRAVENOUS EVERY 8 HOURS
Status: DISCONTINUED | OUTPATIENT
Start: 2023-06-12 | End: 2023-06-12

## 2023-06-12 RX ORDER — FUROSEMIDE 10 MG/ML
80 INJECTION INTRAMUSCULAR; INTRAVENOUS ONCE
Status: COMPLETED | OUTPATIENT
Start: 2023-06-12 | End: 2023-06-12

## 2023-06-12 RX ORDER — CALCIUM CARBONATE 500 MG/1
500 TABLET, CHEWABLE ORAL 3 TIMES DAILY PRN
Status: DISCONTINUED | OUTPATIENT
Start: 2023-06-12 | End: 2023-06-21 | Stop reason: HOSPADM

## 2023-06-12 RX ORDER — NICOTINE POLACRILEX 4 MG
15-30 LOZENGE BUCCAL
Status: DISCONTINUED | OUTPATIENT
Start: 2023-06-12 | End: 2023-06-12

## 2023-06-12 RX ORDER — DEXTROSE MONOHYDRATE 25 G/50ML
25-50 INJECTION, SOLUTION INTRAVENOUS
Status: DISCONTINUED | OUTPATIENT
Start: 2023-06-12 | End: 2023-06-12

## 2023-06-12 RX ORDER — DEXTROSE MONOHYDRATE 100 MG/ML
INJECTION, SOLUTION INTRAVENOUS CONTINUOUS PRN
Status: DISCONTINUED | OUTPATIENT
Start: 2023-06-12 | End: 2023-06-21 | Stop reason: HOSPADM

## 2023-06-12 RX ADMIN — ACETAMINOPHEN 650 MG: 325 TABLET, FILM COATED ORAL at 00:41

## 2023-06-12 RX ADMIN — ACETAMINOPHEN 650 MG: 325 TABLET, FILM COATED ORAL at 14:45

## 2023-06-12 RX ADMIN — HUMAN INSULIN 4 UNITS/HR: 100 INJECTION, SOLUTION SUBCUTANEOUS at 17:35

## 2023-06-12 RX ADMIN — DOPAMINE HYDROCHLORIDE 15 MCG/KG/MIN: 160 INJECTION, SOLUTION INTRAVENOUS at 16:37

## 2023-06-12 RX ADMIN — ASPIRIN 81 MG: 81 TABLET ORAL at 08:29

## 2023-06-12 RX ADMIN — TICAGRELOR 90 MG: 90 TABLET ORAL at 19:48

## 2023-06-12 RX ADMIN — DOPAMINE HYDROCHLORIDE 15 MCG/KG/MIN: 160 INJECTION, SOLUTION INTRAVENOUS at 12:22

## 2023-06-12 RX ADMIN — HUMAN ALBUMIN MICROSPHERES AND PERFLUTREN 6 ML: 10; .22 INJECTION, SOLUTION INTRAVENOUS at 10:06

## 2023-06-12 RX ADMIN — DOPAMINE HYDROCHLORIDE 15 MCG/KG/MIN: 160 INJECTION, SOLUTION INTRAVENOUS at 21:03

## 2023-06-12 RX ADMIN — HEPARIN SODIUM AND DEXTROSE 1200 UNITS/HR: 10000; 5 INJECTION INTRAVENOUS at 10:31

## 2023-06-12 RX ADMIN — FUROSEMIDE 80 MG: 10 INJECTION, SOLUTION INTRAVENOUS at 15:38

## 2023-06-12 RX ADMIN — METHOCARBAMOL 500 MG: 500 TABLET ORAL at 13:38

## 2023-06-12 RX ADMIN — ACETAMINOPHEN 650 MG: 325 TABLET, FILM COATED ORAL at 10:52

## 2023-06-12 RX ADMIN — DOCUSATE SODIUM 50 MG AND SENNOSIDES 8.6 MG 1 TABLET: 8.6; 5 TABLET, FILM COATED ORAL at 19:50

## 2023-06-12 RX ADMIN — DOCUSATE SODIUM 50 MG AND SENNOSIDES 8.6 MG 1 TABLET: 8.6; 5 TABLET, FILM COATED ORAL at 08:29

## 2023-06-12 RX ADMIN — ALUMINUM HYDROXIDE, MAGNESIUM HYDROXIDE, AND SIMETHICONE 30 ML: 200; 200; 20 SUSPENSION ORAL at 04:52

## 2023-06-12 RX ADMIN — PIPERACILLIN AND TAZOBACTAM 3.38 G: 3; .375 INJECTION, POWDER, LYOPHILIZED, FOR SOLUTION INTRAVENOUS at 15:38

## 2023-06-12 RX ADMIN — PIPERACILLIN AND TAZOBACTAM 3.38 G: 3; .375 INJECTION, POWDER, LYOPHILIZED, FOR SOLUTION INTRAVENOUS at 20:27

## 2023-06-12 RX ADMIN — HUMAN INSULIN 4 UNITS/HR: 100 INJECTION, SOLUTION SUBCUTANEOUS at 08:00

## 2023-06-12 RX ADMIN — METHOCARBAMOL 500 MG: 500 TABLET ORAL at 19:48

## 2023-06-12 RX ADMIN — Medication 10 MG: at 22:17

## 2023-06-12 RX ADMIN — ACETAMINOPHEN 650 MG: 325 TABLET, FILM COATED ORAL at 18:32

## 2023-06-12 RX ADMIN — ALUMINUM HYDROXIDE, MAGNESIUM HYDROXIDE, AND SIMETHICONE 30 ML: 200; 200; 20 SUSPENSION ORAL at 00:00

## 2023-06-12 RX ADMIN — DOPAMINE HYDROCHLORIDE 13 MCG/KG/MIN: 160 INJECTION, SOLUTION INTRAVENOUS at 06:48

## 2023-06-12 RX ADMIN — ATORVASTATIN CALCIUM 80 MG: 80 TABLET, FILM COATED ORAL at 19:48

## 2023-06-12 RX ADMIN — FUROSEMIDE 40 MG: 10 INJECTION, SOLUTION INTRAVENOUS at 11:56

## 2023-06-12 RX ADMIN — Medication 0.03 MCG/KG/MIN: at 05:19

## 2023-06-12 RX ADMIN — TICAGRELOR 90 MG: 90 TABLET ORAL at 05:06

## 2023-06-12 RX ADMIN — HUMAN INSULIN 3 UNITS/HR: 100 INJECTION, SOLUTION SUBCUTANEOUS at 02:29

## 2023-06-12 RX ADMIN — ACETAMINOPHEN 650 MG: 325 TABLET, FILM COATED ORAL at 04:52

## 2023-06-12 RX ADMIN — POTASSIUM CHLORIDE 20 MEQ: 1.5 POWDER, FOR SOLUTION ORAL at 16:36

## 2023-06-12 RX ADMIN — PANTOPRAZOLE SODIUM 40 MG: 40 TABLET, DELAYED RELEASE ORAL at 08:29

## 2023-06-12 ASSESSMENT — ACTIVITIES OF DAILY LIVING (ADL)
ADLS_ACUITY_SCORE: 39
DIFFICULTY_EATING/SWALLOWING: NO
ADLS_ACUITY_SCORE: 32
NUMBER_OF_TIMES_PATIENT_HAS_FALLEN_WITHIN_LAST_SIX_MONTHS: 1
ADLS_ACUITY_SCORE: 39
CHANGE_IN_FUNCTIONAL_STATUS_SINCE_ONSET_OF_CURRENT_ILLNESS/INJURY: YES
TOILETING_ISSUES: NO
ADLS_ACUITY_SCORE: 39
DOING_ERRANDS_INDEPENDENTLY_DIFFICULTY: OTHER (SEE COMMENTS)
ADLS_ACUITY_SCORE: 41
ADLS_ACUITY_SCORE: 39
ADLS_ACUITY_SCORE: 41
ADLS_ACUITY_SCORE: 41
ADLS_ACUITY_SCORE: 34
WALKING_OR_CLIMBING_STAIRS_DIFFICULTY: NO
CONCENTRATING,_REMEMBERING_OR_MAKING_DECISIONS_DIFFICULTY: NO
DRESSING/BATHING_DIFFICULTY: NO
ADLS_ACUITY_SCORE: 32
ADLS_ACUITY_SCORE: 34
FALL_HISTORY_WITHIN_LAST_SIX_MONTHS: YES
ADLS_ACUITY_SCORE: 32

## 2023-06-12 NOTE — PLAN OF CARE
Neuro: AOx4, BRADLEY, PERRLA, generalized pain controlled with tylenol and robaxin  Cardio: a fib with occaional SR, HR 60-80s; MAP goal >65 (based off IABP), on levo gtt and dopamine @ 15mg; Tmax 100.4; lactic 2.2 and CVP 14; IABP 1:1, 100% augmented  Respiratory: weaned to room air; intermittent nonproductive cough  GI/:james, lasix given; passing gas, regular diet  Skin: generalized bruising      Handoff given to following RN. For VS and complete assessments, please see documentation flowsheets.

## 2023-06-12 NOTE — PROGRESS NOTES
Cardiology ICU Progress Note  Kimani Glover 77 year old  [id number]  6/11/2023    Primary care provider: Anibal Chamorro    Brief HPI:Kimani Glover is a 77 year old male who was admitted on 6/11/2023  following STEMI and PCI. Patient presented to [facility] ED after near syncope. Patient was reportedly with his family when he fell and almost lost consciousness. EMS was called and he was found to be lethargic with SBP 58 mmHg and bradycardic HR 30s. He was brought to [place] ED where he was found to be bradycardic 32 bpm and hypotensive. EKG showed inferior STEMI. He was started on dopamine and taken to the cath lab for emergent angiography.      Coronary angiogram showed occlusion of dominant RCA. PCI was attempted but was complicated by dissection and no-reflow. Temporary pace maker was placed. He was transferred to [facility] CICU for further management.      On arrival patient hemodynamically stable on 12 dopamine and 0.01 epinephrine. Patient comfortable without chest pain, shortness of breath, lightheadedness, dizziness, fevers, chills.     Interval changes: Restless in bed due to activity restrictions, face mask oxygen in place. Alert and oriented. Denies chest pain or shortness of breath. Brought to [facility] CCL last night and RCA stent patent with DEMARCUS flow 2. IABP placed, when paused this morning Pt developed CP. Hemodynamics on 11 of dopamine: SVO2 41%, CI 1.4, SVR 1248. CVP 18. Will increase dopamine back to 15 and diurese with lasix    Today's Plan:  -CVP every shift  -Calculate hemodynamics with changes in dopamine  -diurese  -Trend end organ markers    ASSESSMENT AND PLAN BY SYSTEM:   Neurology   -No concerns    Current sedation for RASS -2 to -3  None    Plan:  -Daily sedation holiday if tolerates and is safe  -Neuro-crit consulted and appreciate recommendations  -Palliative care consulted  -vEEG    Cardiovascular  # Inferior STEMI  # RCA dissection with stenting of proximal to mid RCA  #Non-culprit 80% D1  lesion  # No reflow (DEMARCUS 0)  # IABP in place  # Dyslipidemia  TTE 6/12: Global and regional left ventricular function is normal with an EF of 60-65%.  Global right ventricular function is moderately reduced.The inferior vena cava is normal.  No pericardial effusion is present.  Angio 6/11: dRCA  70% stenosed, RPDA 50% stenosed.The RCA is a dominant artery withTIMI2 flow. It is stented from the ostium to the genu inferius, the stents are patent.   The RCA has a 70% lesion in its distal segment, that does not require emergency         treatment.   EKG: AFib, ST elevation in inferior leads  Temp Pacer back up VVI 50 rate, 10 mA, 0.50 sensing    Current Pressors/inotropes/antiarrythmic:  Norepinephrine 0.05 mcg/kg/min  Dopamine 13    Hemodynamics:  CVP 18, CI 1.4, SVR 1248, SVO2 41%    Plan:  -Dopamine for CI >2  -diurese  CAD:  - Continue ASA 81mg and ticagrelor 90mg BID for CAD  GDMT:  - Hold lipitor for now given shock liver  - Hold ACE/ARB for now given likely reduced renal fxn after arrest  - Holding beta blocker given shock  - Hold MRA due to likely reduced renal fx after arrest  - Hold SGLT2i due to likely reduced renal fx after arrest    Pulmonary  # Acute hypoxemic respiratory failure   On 8L oxymask  CXR: IABP, temp pacer, RIJ Lines in stable position. CLR.     Plan:  - Diurese  - Daily CXR   - Consider scheduled duonebs if signs of lung dz, currently PRN      Arterial blood gas:  Recent Labs   Lab 06/12/23  1059 06/12/23  0431 06/12/23  0427 06/12/23  0156 06/12/23  0052 06/11/23  2138   PH  --   --  7.40  --  7.36 7.34*   PCO2  --   --  29*  --  28* 32*   PO2  --   --  92  --  106* 99   HCO3  --   --  18*  --  16* 17*   O2PER 0 37 37   < > 37 37    < > = values in this interval not displayed.       Gastrointestinal, Nutrition  -Nutrition: Regular diet  -Last BM: None    Plan:  -Bowel regimen     Liver Function Studies   Recent Labs   Lab 06/12/23  1059 06/12/23  0428 06/11/23  2138   PROTTOTAL 5.5* 5.5*  5.6*   ALBUMIN 3.1* 3.2* 3.2*   BILITOTAL 0.3 0.2 0.2   ALKPHOS 46 46 48   * 344* 307*   * 136* 138*       Renal, Electrolytes  # Acute Renal Injury  # Hypernatremia  # Hypoalbuminemia   # Lactic acidosis  -Baseline CR 0.80  I/O last 24 hours net +738 ml,  ml  I/O since midnight net + 1264 ml  CR 2.04-->2.16  Plan:  -Monitor urine output  -Diurese according to CVP    Recent Labs   Lab 06/12/23  1059 06/12/23  0428 06/12/23  0051 06/11/23  2138 06/11/23  1929 06/11/23  1311   * 136 138 138   < > 138   POTASSIUM 3.9 3.9 3.8 3.7  3.7   < > 3.7   CHLORIDE 101 102 104 103   < > 104   CO2 21* 15* 14* 14*   < > 16*   ANIONGAP 13 19* 20* 21*   < > 18*   BUN 30.8* 28.5* 26.4* 25.4*   < > 24.3*   CR 2.16* 2.04* 1.83* 1.72*   < > 1.65*   GFRESTIMATED 30* 33* 37* 40*   < > 42*   ALLY 9.1 9.1 8.6* 8.9   < > 9.3   MAG  --  2.1 2.1 2.1  --  1.6*   PHOS  --   --  3.9 3.6  --  3.5    < > = values in this interval not displayed.       Infectious Disease  # Aspiration Pneumonia- in the setting of arrest  # Leukocytosis  WBC 20.8, TMAX 100.4  Pro ally 0.92  Lactic 2.2 (7.5)  Cultures thus far:   BLD NGTD   Urine NGTD   COVID Negative  Antibiotics              None  Plan:  -Monitor for signs of infection      Hematology  # Anemia of critical illness  Admission Hgb 13.4,   HGB 11.4,     Plan:  -Heparin infusion  -ASA/ticagrelor for PAULINE  -DVT PPX: Heparin as above    Recent Labs   Lab 06/12/23  0428 06/11/23  2231 06/11/23  1721   WBC 20.8* 22.7* 18.9*   RBC 3.98* 4.19* 4.22*   HGB 11.4* 11.9* 12.3*    234 196     Recent Labs   Lab 06/11/23  1311 06/11/23  0837   INR 1.18* 1.07   PTT >240* 28       Endocrinology  # Hyperglycemia   # Hgb A1c 5.6  No known medical history    Plan:  -Insulin gtt as needed    Integumentary:  - No skin issues    Plan  -early mobility protocol    Clinically Significant Risk Factors Present on Admission           # Hypercalcemia: corrected calcium is >10.1, will  "monitor as appropriate  # Hypomagnesemia: Lowest Mg = 1.6 mg/dL in last 2 days, will replace as needed  # Anion Gap Metabolic Acidosis: Highest Anion Gap = 21 mmol/L in last 2 days, will monitor and treat as appropriate  # Hypoalbuminemia: Lowest albumin = 3.1 g/dL at 6/12/2023 10:59 AM, will monitor as appropriate  # Coagulation Defect: INR = 1.18 (Ref range: 0.85 - 1.15) and/or PTT = >240 Seconds (Ref range: 22 - 38 Seconds), will monitor for bleeding   # Acute Kidney Injury, unspecified: based on a >150% or 0.3 mg/dL increase in last creatinine compared to past 90 day average, will monitor renal function  # Hypertension: Noted on problem list   # Circulatory Shock: currently requiring pressors for blood pressure support     # Obesity: Estimated body mass index is 33.97 kg/m  as calculated from the following:    Height as of an earlier encounter on 6/11/23: 1.753 m (5' 9\").    Weight as of an earlier encounter on 6/11/23: 104.3 kg (230 lb).                   ICU Cares:  CXR: Daily  Fluids/Feeds: diuresing  DVT Prophylaxis: Heparin infusion  GI Prophylaxis: NA  Bowel Regimen: senna   Anticipated Floor Transfer: NA    Lines/Tubes/Drains:  L 8 Fr femoral arterial sheath IABP  R 6 Fr femoral arterial sheath  R 6 Fr femoral venous sheath  R internal jugular CVC  Mckenzie catheter   Current lines are required for patient management       Family updated by me    Patient seen, discussed, and plan reviewed with Dr. Giovanny Mendoza, DNP, APRN, CNP  [facility] Cardiology  Pager [phone]     Physical exam:  General: In bed, restless, body hurts from activity restrictions  HEENT: PERRL, no scleral icterus or injection  CARDIAC: IRR HR, no m/r/g appreciated. Distal peripheral pulses dopplered  RESP: 8 L FM; CTAB, no wheezes, rhonchi or crackles appreciated.  GI: soft, BS hypoactive. No BM  : Mckeznie  EXTREMITIES: No LE edema, pulses 2+. Femoral access site w/o bleeding, dressing c/d/i. No bruits appreciated.   SKIN: No " acute lesions appreciated  NEURO: Intubated    BP (!) 88/51   Pulse 80   Temp 99.9  F (37.7  C)   Resp 30   SpO2 91%   Temp:  [99.7  F (37.6  C)-100.6  F (38.1  C)] 99.9  F (37.7  C)  Pulse:  [66-83] 80  Resp:  [7-31] 30  BP: (88)/(51) 88/51  MAP:  [58 mmHg-62 mmHg] 58 mmHg  Arterial Line BP: (80-85)/(33-44) 80/44  SpO2:  [91 %-100 %] 91 %     IO:    Intake/Output Summary (Last 24 hours) at 6/12/2023 1201  Last data filed at 6/12/2023 1000  Gross per 24 hour   Intake 2457.54 ml   Output 657 ml   Net 1800.54 ml     Labs:  Recent Labs   Lab 06/12/23  1059 06/12/23  0428 06/12/23 0051 06/11/23 2138 06/11/23 1929 06/11/23  1311   * 136 138 138   < > 138   POTASSIUM 3.9 3.9 3.8 3.7  3.7   < > 3.7   CHLORIDE 101 102 104 103   < > 104   CO2 21* 15* 14* 14*   < > 16*   ANIONGAP 13 19* 20* 21*   < > 18*   BUN 30.8* 28.5* 26.4* 25.4*   < > 24.3*   CR 2.16* 2.04* 1.83* 1.72*   < > 1.65*   GFRESTIMATED 30* 33* 37* 40*   < > 42*   SUSAN 9.1 9.1 8.6* 8.9   < > 9.3   MAG  --  2.1 2.1 2.1  --  1.6*   PHOS  --   --  3.9 3.6  --  3.5    < > = values in this interval not displayed.     Recent Labs   Lab 06/12/23  1059 06/12/23 0428 06/11/23 2138   PROTTOTAL 5.5* 5.5* 5.6*   ALBUMIN 3.1* 3.2* 3.2*   BILITOTAL 0.3 0.2 0.2   ALKPHOS 46 46 48   * 344* 307*   * 136* 138*        Recent Labs   Lab 06/12/23  0428 06/11/23  2231 06/11/23  1721   WBC 20.8* 22.7* 18.9*   RBC 3.98* 4.19* 4.22*   HGB 11.4* 11.9* 12.3*   HCT 34.5* 36.5* 36.0*   MCV 87 87 85   MCH 28.6 28.4 29.1   MCHC 33.0 32.6 34.2   RDW 15.0 14.8 14.6    234 196     Recent Labs   Lab 06/11/23  1311 06/11/23  0837   INR 1.18* 1.07     No lab results found in last 7 days.  Arterial Blood Gas   Recent Labs   Lab 06/12/23  1059 06/12/23  0431 06/12/23  0427 06/12/23  0156 06/12/23  0052 06/11/23  2138   PH  --   --  7.40  --  7.36 7.34*   PCO2  --   --  29*  --  28* 32*   PO2  --   --  92  --  106* 99   HCO3  --   --  18*  --  16* 17*   O2PER 0 37  37   < > 37 37    < > = values in this interval not displayed.         Imaging/procedure results:  Echo Limited  [id number]  [id number]  [id number]  [id number]^GEOVANNI^HETAL     [facility],[place]  Echocardiography Laboratory  [address] [place] [id number]     Name: CUATE LEE  MRN: [id number]  : 1945  Study Date: 2023 09:37 AM  Age: 77 yrs  Gender: Male  Patient Location: [facility]  Reason For Study: Acute Myocardial Infarction  Ordering Physician: HETAL GALARZA  Referring Physician: ALLEN BAUMAN  Performed By: Amelie Styles     BSA: 2.2 m2  Height: 69 in  Weight: 230 lb  BP: 128/89 mmHg  ______________________________________________________________________________  Procedure  Limited Portable Echo Adult. Contrast Optison. Patient was given 6 ml mixture  of 3 ml Optison and 6 ml saline. 3 ml wasted.  ______________________________________________________________________________  Interpretation Summary  Global and regional left ventricular function is normal with an EF of 60-65%.  Global right ventricular function is moderately reduced.  The inferior vena cava is normal.  No pericardial effusion is present.  ______________________________________________________________________________  Left Ventricle  Global and regional left ventricular function is normal with an EF of 60-65%.     Right Ventricle  Global right ventricular function is moderately reduced.     Vessels  The inferior vena cava is normal.     Pericardium  No pericardial effusion is present.     ______________________________________________________________________________  MMode/2D Measurements & Calculations  asc Aorta Diam: 3.9 cm     ______________________________________________________________________________  Report approved by: Robert Willingham 2023 10:32 AM        XR Chest Port 1 View  Narrative: EXAM: XR CHEST PORT 1 VIEW  2023 12:38 AM     HISTORY:  daily IABP  check       COMPARISON:  6/11/2023    TECHNIQUE: Portable AP supine radiograph of the chest    FINDINGS:   Right IJ CVC tip projects over the cavoatrial junction. Intra-aortic  balloon pump has been advanced, now with marker at the level of the  anisa..    The trachea is midline. The cardiomediastinal silhouette is stable.  Low lung volumes. No appreciable pneumothorax or pleural effusion.  Streaky perihilar and bibasilar opacities. No focal airspace  consolidation. No acute osseous abnormality.  Impression: IMPRESSION:  1. Intra-aortic balloon pump has been advanced, now with marker at the  level of the anisa.  2. Right IJ CVC tip projects over the cavoatrial junction.  3. Low lung volumes with streaky perihilar and bibasilar atelectasis.    I have personally reviewed the examination and initial interpretation  and I agree with the findings.    GIGI ANN MD         SYSTEM ID:  [id number]  US Lower Extremity Arterial Duplex Bilateral  Narrative: ULTRASOUND LOWER EXTREMITY ARTERIAL DUPLEX BILATERAL  6/12/2023 3:25  AM    CLINICAL HISTORY: Patient with femoral balloon pump. Unable to find  LLE DP pulse..     COMPARISONS: None available.    REFERRING PROVIDER: NATE ROCHE    TECHNIQUE: Bilateral leg arteries evaluated with color Doppler and  Doppler waveform ultrasound    FINDINGS:     RIGHT:       Common femoral artery: OBSCURED       Profundus femoral artery: OBSCURED         Superficial femoral artery, origin: OBSCURED       Superficial femoral artery, mid thigh: 50/0 cm/s, triphasic       Superficial femoral artery, distal thigh: 65/0 cm/s, triphasic         Popliteal artery: 42/0 cm/s, triphasic         Posterior tibial artery, ankle: 46/0 cm/s, triphasic       Anterior tibial artery, ankle: 16/5 cm/s, arterial monophasic    LEFT:       Common femoral artery: OBSCURED       Profundus femoral artery: OBSCURED         Superficial femoral artery, origin: OBSCURED       Superficial femoral artery, proximal  thigh: 90/0 cm/s, triphasic       Superficial femoral artery, mid thigh: 38/0 cm/s, triphasic       Superficial femoral artery, distal thigh: 42/2 cm/s, triphasic         Popliteal artery: 38/0 cm/s, triphasic         Posterior tibial artery, ankle: 48/0 cm/s, triphasic       Anterior tibial artery, ankle: 10/0 cm/s, biphasic    Dorsalis pedis arteries not evaluated.  Impression: IMPRESSION: Dopplerable flow in bilateral posterior tibial and  anterior tibial arteries at the ankles. Dorsalis pedis arteries not  evaluated.    I have personally reviewed the examination and initial interpretation  and I agree with the findings.    OPAL DE ANDA MD         SYSTEM ID:  [id number]

## 2023-06-12 NOTE — PROGRESS NOTES
Major Shift Events:    Lactic acid increased to 8.8. Despite  mL Bolus given.  ScvO2 = 61%.Will decrease Epi infusion.    Blood sugar climbed to >300. Insulin drip started.    Plan: Manage blood sugar. Wean off pressors.    For vital signs and complete assessments, please see documentation flowsheets.

## 2023-06-13 ENCOUNTER — APPOINTMENT (OUTPATIENT)
Dept: GENERAL RADIOLOGY | Facility: CLINIC | Age: 78
DRG: 270 | End: 2023-06-13
Attending: INTERNAL MEDICINE
Payer: COMMERCIAL

## 2023-06-13 LAB
ALBUMIN SERPL BCG-MCNC: 2.9 G/DL (ref 3.5–5.2)
ALBUMIN SERPL BCG-MCNC: 3 G/DL (ref 3.5–5.2)
ALP SERPL-CCNC: 50 U/L (ref 40–129)
ALP SERPL-CCNC: 54 U/L (ref 40–129)
ALT SERPL W P-5'-P-CCNC: 100 U/L (ref 10–50)
ALT SERPL W P-5'-P-CCNC: 91 U/L (ref 0–70)
ANION GAP SERPL CALCULATED.3IONS-SCNC: 12 MMOL/L (ref 7–15)
ANION GAP SERPL CALCULATED.3IONS-SCNC: 12 MMOL/L (ref 7–15)
APTT PPP: 59 SECONDS (ref 22–38)
APTT PPP: 63 SECONDS (ref 22–38)
AST SERPL W P-5'-P-CCNC: 199 U/L (ref 0–45)
AST SERPL W P-5'-P-CCNC: 270 U/L (ref 10–50)
BACTERIA UR CULT: NO GROWTH
BASE EXCESS BLDV CALC-SCNC: -0.6 MMOL/L (ref -7.7–1.9)
BASE EXCESS BLDV CALC-SCNC: 0.1 MMOL/L (ref -7.7–1.9)
BASE EXCESS BLDV CALC-SCNC: 1 MMOL/L (ref -7.7–1.9)
BASE EXCESS BLDV CALC-SCNC: 1.1 MMOL/L (ref -7.7–1.9)
BASE EXCESS BLDV CALC-SCNC: 1.4 MMOL/L (ref -7.7–1.9)
BASE EXCESS BLDV CALC-SCNC: 1.6 MMOL/L (ref -7.7–1.9)
BILIRUB SERPL-MCNC: 0.6 MG/DL
BILIRUB SERPL-MCNC: 0.7 MG/DL
BUN SERPL-MCNC: 32.4 MG/DL (ref 8–23)
BUN SERPL-MCNC: 34 MG/DL (ref 8–23)
BUN SERPL-MCNC: 34.9 MG/DL (ref 8–23)
CALCIUM SERPL-MCNC: 8.6 MG/DL (ref 8.8–10.2)
CALCIUM SERPL-MCNC: 8.7 MG/DL (ref 8.8–10.2)
CHLORIDE SERPL-SCNC: 100 MMOL/L (ref 98–107)
CHLORIDE SERPL-SCNC: 104 MMOL/L (ref 98–107)
CREAT SERPL-MCNC: 1.69 MG/DL (ref 0.67–1.17)
CREAT SERPL-MCNC: 1.72 MG/DL (ref 0.67–1.17)
CREAT SERPL-MCNC: 1.73 MG/DL (ref 0.67–1.17)
DEPRECATED HCO3 PLAS-SCNC: 21 MMOL/L (ref 22–29)
DEPRECATED HCO3 PLAS-SCNC: 22 MMOL/L (ref 22–29)
ERYTHROCYTE [DISTWIDTH] IN BLOOD BY AUTOMATED COUNT: 15.4 % (ref 10–15)
ERYTHROCYTE [DISTWIDTH] IN BLOOD BY AUTOMATED COUNT: 15.4 % (ref 10–15)
GFR SERPL CREATININE-BSD FRML MDRD: 40 ML/MIN/1.73M2
GFR SERPL CREATININE-BSD FRML MDRD: 40 ML/MIN/1.73M2
GFR SERPL CREATININE-BSD FRML MDRD: 41 ML/MIN/1.73M2
GLUCOSE BLDC GLUCOMTR-MCNC: 101 MG/DL (ref 70–99)
GLUCOSE BLDC GLUCOMTR-MCNC: 101 MG/DL (ref 70–99)
GLUCOSE BLDC GLUCOMTR-MCNC: 114 MG/DL (ref 70–99)
GLUCOSE BLDC GLUCOMTR-MCNC: 116 MG/DL (ref 70–99)
GLUCOSE BLDC GLUCOMTR-MCNC: 118 MG/DL (ref 70–99)
GLUCOSE BLDC GLUCOMTR-MCNC: 120 MG/DL (ref 70–99)
GLUCOSE BLDC GLUCOMTR-MCNC: 129 MG/DL (ref 70–99)
GLUCOSE BLDC GLUCOMTR-MCNC: 135 MG/DL (ref 70–99)
GLUCOSE BLDC GLUCOMTR-MCNC: 138 MG/DL (ref 70–99)
GLUCOSE BLDC GLUCOMTR-MCNC: 141 MG/DL (ref 70–99)
GLUCOSE BLDC GLUCOMTR-MCNC: 143 MG/DL (ref 70–99)
GLUCOSE BLDC GLUCOMTR-MCNC: 144 MG/DL (ref 70–99)
GLUCOSE BLDC GLUCOMTR-MCNC: 145 MG/DL (ref 70–99)
GLUCOSE BLDC GLUCOMTR-MCNC: 148 MG/DL (ref 70–99)
GLUCOSE BLDC GLUCOMTR-MCNC: 148 MG/DL (ref 70–99)
GLUCOSE BLDC GLUCOMTR-MCNC: 151 MG/DL (ref 70–99)
GLUCOSE BLDC GLUCOMTR-MCNC: 159 MG/DL (ref 70–99)
GLUCOSE BLDC GLUCOMTR-MCNC: 90 MG/DL (ref 70–99)
GLUCOSE SERPL-MCNC: 119 MG/DL (ref 70–99)
GLUCOSE SERPL-MCNC: 152 MG/DL (ref 70–99)
HCO3 BLDV-SCNC: 24 MMOL/L (ref 21–28)
HCO3 BLDV-SCNC: 24 MMOL/L (ref 21–28)
HCO3 BLDV-SCNC: 25 MMOL/L (ref 21–28)
HCT VFR BLD AUTO: 29.5 % (ref 40–53)
HCT VFR BLD AUTO: 29.6 % (ref 40–53)
HGB BLD-MCNC: 9.7 G/DL (ref 13.3–17.7)
HGB BLD-MCNC: 9.9 G/DL (ref 13.3–17.7)
LACTATE SERPL-SCNC: 1.1 MMOL/L (ref 0.7–2)
LACTATE SERPL-SCNC: 1.5 MMOL/L (ref 0.7–2)
LACTATE SERPL-SCNC: 1.6 MMOL/L (ref 0.7–2)
LACTATE SERPL-SCNC: 1.8 MMOL/L (ref 0.7–2)
MAGNESIUM SERPL-MCNC: 2.3 MG/DL (ref 1.7–2.3)
MAGNESIUM SERPL-MCNC: 2.4 MG/DL (ref 1.7–2.3)
MCH RBC QN AUTO: 28.4 PG (ref 26.5–33)
MCH RBC QN AUTO: 28.7 PG (ref 26.5–33)
MCHC RBC AUTO-ENTMCNC: 32.9 G/DL (ref 31.5–36.5)
MCHC RBC AUTO-ENTMCNC: 33.4 G/DL (ref 31.5–36.5)
MCV RBC AUTO: 86 FL (ref 78–100)
MCV RBC AUTO: 86 FL (ref 78–100)
O2/TOTAL GAS SETTING VFR VENT: 2 %
O2/TOTAL GAS SETTING VFR VENT: 28 %
OXYHGB MFR BLDV: 47 % (ref 70–75)
OXYHGB MFR BLDV: 50 % (ref 70–75)
OXYHGB MFR BLDV: 51 % (ref 70–75)
OXYHGB MFR BLDV: 51 % (ref 70–75)
OXYHGB MFR BLDV: 52 % (ref 70–75)
OXYHGB MFR BLDV: 53 % (ref 70–75)
PCO2 BLDV: 35 MM HG (ref 40–50)
PCO2 BLDV: 35 MM HG (ref 40–50)
PCO2 BLDV: 36 MM HG (ref 40–50)
PCO2 BLDV: 38 MM HG (ref 40–50)
PH BLDV: 7.42 [PH] (ref 7.32–7.43)
PH BLDV: 7.43 [PH] (ref 7.32–7.43)
PH BLDV: 7.44 [PH] (ref 7.32–7.43)
PH BLDV: 7.45 [PH] (ref 7.32–7.43)
PH BLDV: 7.46 [PH] (ref 7.32–7.43)
PH BLDV: 7.47 [PH] (ref 7.32–7.43)
PHOSPHATE SERPL-MCNC: 2.9 MG/DL (ref 2.5–4.5)
PLAT MORPH BLD: NORMAL
PLATELET # BLD AUTO: 76 10E3/UL (ref 150–450)
PLATELET # BLD AUTO: 85 10E3/UL (ref 150–450)
PO2 BLDV: 28 MM HG (ref 25–47)
PO2 BLDV: 28 MM HG (ref 25–47)
PO2 BLDV: 29 MM HG (ref 25–47)
PO2 BLDV: 29 MM HG (ref 25–47)
PO2 BLDV: 30 MM HG (ref 25–47)
PO2 BLDV: 30 MM HG (ref 25–47)
POTASSIUM SERPL-SCNC: 3.7 MMOL/L (ref 3.4–5.3)
POTASSIUM SERPL-SCNC: 3.8 MMOL/L (ref 3.4–5.3)
POTASSIUM SERPL-SCNC: 3.8 MMOL/L (ref 3.4–5.3)
PROT SERPL-MCNC: 5.5 G/DL (ref 6.4–8.3)
PROT SERPL-MCNC: 5.5 G/DL (ref 6.4–8.3)
RBC # BLD AUTO: 3.42 10E6/UL (ref 4.4–5.9)
RBC # BLD AUTO: 3.45 10E6/UL (ref 4.4–5.9)
RBC MORPH BLD: NORMAL
SODIUM SERPL-SCNC: 133 MMOL/L (ref 136–145)
SODIUM SERPL-SCNC: 138 MMOL/L (ref 136–145)
UFH PPP CHRO-ACNC: 0.17 IU/ML
UFH PPP CHRO-ACNC: 0.21 IU/ML
UFH PPP CHRO-ACNC: 0.33 IU/ML
WBC # BLD AUTO: 18.3 10E3/UL (ref 4–11)
WBC # BLD AUTO: 19.9 10E3/UL (ref 4–11)

## 2023-06-13 PROCEDURE — 82565 ASSAY OF CREATININE: CPT

## 2023-06-13 PROCEDURE — 84100 ASSAY OF PHOSPHORUS: CPT | Performed by: INTERNAL MEDICINE

## 2023-06-13 PROCEDURE — 258N000003 HC RX IP 258 OP 636: Performed by: INTERNAL MEDICINE

## 2023-06-13 PROCEDURE — 999N000075 HC STATISTIC IABP MONITORING

## 2023-06-13 PROCEDURE — 84520 ASSAY OF UREA NITROGEN: CPT

## 2023-06-13 PROCEDURE — 84132 ASSAY OF SERUM POTASSIUM: CPT | Performed by: INTERNAL MEDICINE

## 2023-06-13 PROCEDURE — 250N000009 HC RX 250

## 2023-06-13 PROCEDURE — 250N000011 HC RX IP 250 OP 636: Performed by: NURSE PRACTITIONER

## 2023-06-13 PROCEDURE — 83605 ASSAY OF LACTIC ACID: CPT | Performed by: STUDENT IN AN ORGANIZED HEALTH CARE EDUCATION/TRAINING PROGRAM

## 2023-06-13 PROCEDURE — 82805 BLOOD GASES W/O2 SATURATION: CPT | Performed by: INTERNAL MEDICINE

## 2023-06-13 PROCEDURE — 84450 TRANSFERASE (AST) (SGOT): CPT

## 2023-06-13 PROCEDURE — 85520 HEPARIN ASSAY: CPT | Performed by: INTERNAL MEDICINE

## 2023-06-13 PROCEDURE — 82962 GLUCOSE BLOOD TEST: CPT

## 2023-06-13 PROCEDURE — 250N000011 HC RX IP 250 OP 636: Performed by: STUDENT IN AN ORGANIZED HEALTH CARE EDUCATION/TRAINING PROGRAM

## 2023-06-13 PROCEDURE — 85730 THROMBOPLASTIN TIME PARTIAL: CPT

## 2023-06-13 PROCEDURE — 83735 ASSAY OF MAGNESIUM: CPT | Performed by: INTERNAL MEDICINE

## 2023-06-13 PROCEDURE — 250N000011 HC RX IP 250 OP 636: Performed by: INTERNAL MEDICINE

## 2023-06-13 PROCEDURE — 250N000013 HC RX MED GY IP 250 OP 250 PS 637: Performed by: STUDENT IN AN ORGANIZED HEALTH CARE EDUCATION/TRAINING PROGRAM

## 2023-06-13 PROCEDURE — 250N000011 HC RX IP 250 OP 636

## 2023-06-13 PROCEDURE — 71045 X-RAY EXAM CHEST 1 VIEW: CPT | Mod: 26 | Performed by: RADIOLOGY

## 2023-06-13 PROCEDURE — 250N000013 HC RX MED GY IP 250 OP 250 PS 637: Performed by: NURSE PRACTITIONER

## 2023-06-13 PROCEDURE — 84155 ASSAY OF PROTEIN SERUM: CPT

## 2023-06-13 PROCEDURE — 93010 ELECTROCARDIOGRAM REPORT: CPT | Performed by: INTERNAL MEDICINE

## 2023-06-13 PROCEDURE — 93005 ELECTROCARDIOGRAM TRACING: CPT

## 2023-06-13 PROCEDURE — 71045 X-RAY EXAM CHEST 1 VIEW: CPT

## 2023-06-13 PROCEDURE — 82805 BLOOD GASES W/O2 SATURATION: CPT

## 2023-06-13 PROCEDURE — 200N000002 HC R&B ICU UMMC

## 2023-06-13 PROCEDURE — 99233 SBSQ HOSP IP/OBS HIGH 50: CPT

## 2023-06-13 PROCEDURE — 85027 COMPLETE CBC AUTOMATED: CPT

## 2023-06-13 PROCEDURE — 250N000013 HC RX MED GY IP 250 OP 250 PS 637

## 2023-06-13 RX ORDER — POTASSIUM CHLORIDE 29.8 MG/ML
20 INJECTION INTRAVENOUS ONCE
Status: COMPLETED | OUTPATIENT
Start: 2023-06-13 | End: 2023-06-13

## 2023-06-13 RX ORDER — QUETIAPINE FUMARATE 50 MG/1
50 TABLET, FILM COATED ORAL AT BEDTIME
Status: DISCONTINUED | OUTPATIENT
Start: 2023-06-13 | End: 2023-06-14

## 2023-06-13 RX ORDER — DIGOXIN 0.25 MG/ML
125 INJECTION INTRAMUSCULAR; INTRAVENOUS DAILY
Status: DISCONTINUED | OUTPATIENT
Start: 2023-06-14 | End: 2023-06-14

## 2023-06-13 RX ORDER — DOPAMINE HYDROCHLORIDE 320 MG/100ML
12 INJECTION, SOLUTION INTRAVENOUS CONTINUOUS
Status: DISCONTINUED | OUTPATIENT
Start: 2023-06-13 | End: 2023-06-13

## 2023-06-13 RX ORDER — FUROSEMIDE 10 MG/ML
80 INJECTION INTRAMUSCULAR; INTRAVENOUS ONCE
Status: COMPLETED | OUTPATIENT
Start: 2023-06-13 | End: 2023-06-13

## 2023-06-13 RX ORDER — NOREPINEPHRINE BITARTRATE 0.06 MG/ML
.01-.6 INJECTION, SOLUTION INTRAVENOUS CONTINUOUS
Status: DISCONTINUED | OUTPATIENT
Start: 2023-06-13 | End: 2023-06-17

## 2023-06-13 RX ORDER — DIGOXIN 0.25 MG/ML
500 INJECTION INTRAMUSCULAR; INTRAVENOUS ONCE
Status: COMPLETED | OUTPATIENT
Start: 2023-06-13 | End: 2023-06-13

## 2023-06-13 RX ORDER — QUETIAPINE FUMARATE 25 MG/1
25 TABLET, FILM COATED ORAL 2 TIMES DAILY PRN
Status: DISCONTINUED | OUTPATIENT
Start: 2023-06-13 | End: 2023-06-14

## 2023-06-13 RX ADMIN — ACETAMINOPHEN 650 MG: 325 TABLET, FILM COATED ORAL at 08:04

## 2023-06-13 RX ADMIN — ACETAMINOPHEN 650 MG: 325 TABLET, FILM COATED ORAL at 14:29

## 2023-06-13 RX ADMIN — PIPERACILLIN AND TAZOBACTAM 3.38 G: 3; .375 INJECTION, POWDER, LYOPHILIZED, FOR SOLUTION INTRAVENOUS at 02:57

## 2023-06-13 RX ADMIN — TICAGRELOR 90 MG: 90 TABLET ORAL at 20:53

## 2023-06-13 RX ADMIN — DIGOXIN 500 MCG: 250 INJECTION, SOLUTION INTRAMUSCULAR; INTRAVENOUS at 11:09

## 2023-06-13 RX ADMIN — POTASSIUM CHLORIDE 20 MEQ: 29.8 INJECTION, SOLUTION INTRAVENOUS at 17:08

## 2023-06-13 RX ADMIN — ACETAMINOPHEN 650 MG: 325 TABLET, FILM COATED ORAL at 03:30

## 2023-06-13 RX ADMIN — PIPERACILLIN AND TAZOBACTAM 3.38 G: 3; .375 INJECTION, POWDER, LYOPHILIZED, FOR SOLUTION INTRAVENOUS at 08:04

## 2023-06-13 RX ADMIN — DOPAMINE HYDROCHLORIDE 15 MCG/KG/MIN: 160 INJECTION, SOLUTION INTRAVENOUS at 10:46

## 2023-06-13 RX ADMIN — HUMAN INSULIN 2 UNITS/HR: 100 INJECTION, SOLUTION SUBCUTANEOUS at 20:31

## 2023-06-13 RX ADMIN — METHOCARBAMOL 500 MG: 500 TABLET ORAL at 15:57

## 2023-06-13 RX ADMIN — POTASSIUM CHLORIDE 20 MEQ: 29.8 INJECTION, SOLUTION INTRAVENOUS at 06:00

## 2023-06-13 RX ADMIN — METHOCARBAMOL 500 MG: 500 TABLET ORAL at 03:30

## 2023-06-13 RX ADMIN — QUETIAPINE FUMARATE 50 MG: 50 TABLET ORAL at 22:11

## 2023-06-13 RX ADMIN — PIPERACILLIN AND TAZOBACTAM 3.38 G: 3; .375 INJECTION, POWDER, LYOPHILIZED, FOR SOLUTION INTRAVENOUS at 21:14

## 2023-06-13 RX ADMIN — ATORVASTATIN CALCIUM 80 MG: 80 TABLET, FILM COATED ORAL at 20:53

## 2023-06-13 RX ADMIN — METHOCARBAMOL 500 MG: 500 TABLET ORAL at 10:10

## 2023-06-13 RX ADMIN — DOPAMINE HYDROCHLORIDE 15 MCG/KG/MIN: 160 INJECTION, SOLUTION INTRAVENOUS at 06:12

## 2023-06-13 RX ADMIN — BIVALIRUDIN 0.05 MG/KG/HR: 250 INJECTION, POWDER, LYOPHILIZED, FOR SOLUTION INTRAVENOUS at 17:29

## 2023-06-13 RX ADMIN — HUMAN INSULIN 4 UNITS/HR: 100 INJECTION, SOLUTION SUBCUTANEOUS at 06:04

## 2023-06-13 RX ADMIN — ACETAMINOPHEN 650 MG: 325 TABLET, FILM COATED ORAL at 20:52

## 2023-06-13 RX ADMIN — METHOCARBAMOL 500 MG: 500 TABLET ORAL at 22:11

## 2023-06-13 RX ADMIN — FUROSEMIDE 80 MG: 10 INJECTION, SOLUTION INTRAVENOUS at 18:39

## 2023-06-13 RX ADMIN — PANTOPRAZOLE SODIUM 40 MG: 40 TABLET, DELAYED RELEASE ORAL at 08:04

## 2023-06-13 RX ADMIN — DOPAMINE HYDROCHLORIDE 12 MCG/KG/MIN: 40 INJECTION, SOLUTION, CONCENTRATE INTRAVENOUS at 21:15

## 2023-06-13 RX ADMIN — Medication 0.01 MCG/KG/MIN: at 20:17

## 2023-06-13 RX ADMIN — DOPAMINE HYDROCHLORIDE 12 MCG/KG/MIN: 160 INJECTION, SOLUTION INTRAVENOUS at 15:27

## 2023-06-13 RX ADMIN — PIPERACILLIN AND TAZOBACTAM 3.38 G: 3; .375 INJECTION, POWDER, LYOPHILIZED, FOR SOLUTION INTRAVENOUS at 14:34

## 2023-06-13 RX ADMIN — ASPIRIN 81 MG: 81 TABLET ORAL at 08:03

## 2023-06-13 RX ADMIN — DOPAMINE HYDROCHLORIDE 15 MCG/KG/MIN: 160 INJECTION, SOLUTION INTRAVENOUS at 01:28

## 2023-06-13 RX ADMIN — TICAGRELOR 90 MG: 90 TABLET ORAL at 08:04

## 2023-06-13 RX ADMIN — Medication 10 MG: at 22:11

## 2023-06-13 RX ADMIN — POTASSIUM CHLORIDE 20 MEQ: 29.8 INJECTION, SOLUTION INTRAVENOUS at 22:14

## 2023-06-13 RX ADMIN — ALUMINUM HYDROXIDE, MAGNESIUM HYDROXIDE, AND SIMETHICONE 30 ML: 200; 200; 20 SUSPENSION ORAL at 00:20

## 2023-06-13 RX ADMIN — HEPARIN SODIUM AND DEXTROSE 1350 UNITS/HR: 10000; 5 INJECTION INTRAVENOUS at 04:59

## 2023-06-13 RX ADMIN — FUROSEMIDE 80 MG: 10 INJECTION, SOLUTION INTRAVENOUS at 10:49

## 2023-06-13 ASSESSMENT — ACTIVITIES OF DAILY LIVING (ADL)
ADLS_ACUITY_SCORE: 34

## 2023-06-13 NOTE — PROGRESS NOTES
"Major Shift Events:   A&O x3 - disoriented to reason he is in the hospital, forgetful/delirious, main complaint is \"I just feel so tired\".  Utilizing Tylenol/Robaxin for discomfort.   Acc junctional on EKG this AM, HR 60s-100s. Able to turn off Levo this AM. Titrated Dopamine from 15 to 10 per CSI. CVP 11-14.   No IABP alarms, MAP >65. Calf and thigh measurements unchanged.  2 L NC today dt patient dozing in and out of sleep.  Decreased appetite today, little oral fluids taken in. 1x liquid small-mod sized BM.  Insulin gtt 2-4. 80 IV Lasix given x2. Mckenzie d/c'd, external cath placed.  Drop in plts from 186 to 72 - heparin gtt stopped and bival began.   Plan: Continue to wean dopamine as able.   For vital signs and complete assessments, please see documentation flowsheets.     "

## 2023-06-13 NOTE — PLAN OF CARE
Major Shift Events:  Neuro: A&O x4, LOUISA BRADLEY. Robaxin and tylenol for chest discomfort and generalized body aches   Cardiac: SR-Afib, HR 60's-80's. ST elevation. Dpamine at 15mcg/kg/min, levo at 0.02. MAPS have remained >65. Doppler all pulses. Potassium replaced this morning. Heparin rate changed to 1350u/hr   Respiratory: Room air, needed 2LNC while sleeping. LS clear.Occasional strong non productive cough  GI: Passing flatus,on a bowel regimen. Regular diet  : Mckenzie with about 30cc/hr output   Skin: Bruised on left flank, right upper arm. Oozing right elbow skin tear       Plan: Trending labs including lactic  For vital signs and complete assessments, please see documentation flowsheets.        Goal Outcome Evaluation:      Plan of Care Reviewed With: patient    Overall Patient Progress: no changeOverall Patient Progress: no change    Outcome Evaluation: Continous to need dopamine and levo

## 2023-06-13 NOTE — CONSULTS
Care Management Initial Consult    General Information  Assessment completed with: Patient, VM-chart review, Other (Daughter-in-law (Chely)), Kimani & Chely (DIL)  Type of CM/SW Visit: Initial Assessment    Primary Care Provider verified and updated as needed: Yes (Dr. Anibal Chamorro - however Dr. Chamorro will be leaving the clinic soon and pt needs to establish care with new provider)   Readmission within the last 30 days: no previous admission in last 30 days      Reason for Consult: other (see comments) (Elevated Risk Score)  Advance Care Planning: Advance Care Planning Reviewed: no concerns identified          Communication Assessment  Patient's communication style: spoken language (English or Bilingual)    Hearing Difficulty or Deaf: no   Wear Glasses or Blind: other (see comments) (readers)    Cognitive  Cognitive/Neuro/Behavioral: WDL                      Living Environment:   People in home: other (see comments) (Pt lives with his adult step-son (Oral), Chely and their two dependent children.)     Current living Arrangements: house (3 LUPE, 0 STI that pt needs to access. Stairs to laundry but Chely does pt's laundry.)      Able to return to prior arrangements: yes       Family/Social Support:  Care provided by: self, other (see comments) (Chely)  Provides care for: no one  Marital Status:   Children          Description of Support System: Supportive, Involved         Current Resources:   Patient receiving home care services: No     Community Resources: None  Equipment currently used at home: none  Supplies currently used at home: None    Employment/Financial:  Employment Status: retired        Financial Concerns: other (see comments) (Chely mentioned that if pt were to need assisted in the future, pt may not have funds for this.)           Does the patient's insurance plan have a 3 day qualifying hospital stay waiver?  Yes   Will the waiver be used for post-acute placement? Discharge plan is  "still TBD while pt remains in ICU. Pt admitted to hospital on 6/11/23 at 1433.     Lifestyle & Psychosocial Needs:  Social Determinants of Health     Tobacco Use: Low Risk  (6/12/2023)    Patient History      Smoking Tobacco Use: Never      Smokeless Tobacco Use: Never      Passive Exposure: Not on file   Alcohol Use: Not on file   Financial Resource Strain: Not on file   Food Insecurity: Not on file   Transportation Needs: Not on file   Physical Activity: Not on file   Stress: Not on file   Social Connections: Not on file   Intimate Partner Violence: Not on file   Depression: Not on file   Housing Stability: Not on file       Functional Status:  Prior to admission patient needed assistance:   Dependent ADLs:: Independent  Dependent IADLs:: Cleaning, Cooking, Laundry, Meal Preparation, Transportation, Money Management   Chely assists pt with these tasks. Pt has not driven for about a year per Chely.        Mental Health Status:  Mental Health Status: No Current Concerns       Chemical Dependency Status:  Chemical Dependency Status: No Current Concerns             Values/Beliefs:  Spiritual, Cultural Beliefs, Taoist Practices, Values that affect care:                 Additional Information:  Per H&P, \"Kimani Glover is a 77 year old male who was admitted on 6/11/2023 following STEMI and PCI. Patient presented to [place]'s ED after near syncope. Patient was reportedly with his family when he fell and almost lost consciousness. EMS was called and he was found to be lethargic with SBP 58 mmHg and bradycardic HR 30s. He was brought to [place] ED where he was found to be bradycardic 32 bpm and hypotensive. EKG showed inferior STEMI. He was started on dopamine and taken to the cath lab for emergent angiography. \"    SW met with pt at bedside to complete CMA d/t elevated risk score. SW introduced self and role. Bedside RN had reported that pt was on a lot of medication and may be feeling drowsy. Pt also reported " not feeling super awake during conversation and reported it was ok for SW to call pt's daughter-in-law (Chely) for more information. ADAN also spoke with Chely over the phone to obtain some information.     Pt lives with Carole Clark), his spouse (Chely) and their two children (ages 2 & 7). No previous home care or services at home. Pt is a  but not currently connected to any [facility] resources/supports. Pt enjoys watching Nascar. Chely asked about some supports through the [facility] and ADAN provided some information and education on seeing if pt is service connected. Chely reports that Oral is keeping pt's son updated. Pt and son typically catch up via phone call on Sundays as pt's son lives in [place] and travels often out of the country for work. Chely reported that she is available for updates/questions until 1600 when she has to go to work. Oral is available for updates or questions after 1600 when he is off work.     Oral Flavia (Carole), ph: [phone]    SW/RNCC will continue to follow during pt hospitalization.     Edith Luque ADAN  [facility]   [facility]   4A/E ADAN Coverage  4A/E ADAN Ph: [phone]

## 2023-06-13 NOTE — PROGRESS NOTES
Cardiology ICU Progress Note  Kimani Glover 77 year old  [id number]  6/11/2023    Primary care provider: Anibal Chamorro HPI:Kimani Glover is a 77 year old male who was admitted on 6/11/2023  following STEMI and PCI. Patient presented to [facility] ED after near syncope. Patient was reportedly with his family when he fell and almost lost consciousness. EMS was called and he was found to be lethargic with SBP 58 mmHg and bradycardic HR 30s. He was brought to [place] ED where he was found to be bradycardic 32 bpm and hypotensive. EKG showed inferior STEMI. He was started on dopamine and taken to the cath lab for emergent angiography.      Coronary angiogram showed occlusion of dominant RCA. PCI was attempted but was complicated by dissection and no-reflow. Temporary pace maker was placed. He was transferred to [facility] CICU for further management.      On arrival patient hemodynamically stable on 12 dopamine and 0.01 epinephrine. Patient comfortable without chest pain, shortness of breath, lightheadedness, dizziness, fevers, chills.     Interval changes: Delerius this morning, does not remember where he is or the reason he is in the hospital. Will start seroquel at bedtime for acute delirium. Over all he states he is feeling better today. No SOB or CP.DIureses yesterday and CVP 14 down from 18 yesterday. Will wean dopamine over the day today and follow hemodynamics. IABP in R groin at 1:1 and no acute issues with pump. Groin site without hematoma, distal pulses dopplered. PLT dropped to 85 this morning. Recheck this afternoon 76. 4T score +4 intermediate. Will switch to bivalirudin and send HIT/JANIYA         Today's Plan:  -wean dopamine for index over 2  -Calculate hemodynamics with changes in dopamine  -diurese according to CVP. Goal 10-12  -Trend end organ markers  -discontinue all heparin  -send HIT 0100  -bivalirudin    ASSESSMENT AND PLAN BY SYSTEM:   Neurology   -delirium    Plan:  -Seroquel at night  and PRN    Cardiovascular  # Inferior STEMI  # RCA dissection with stenting of proximal to mid RCA  #Non-culprit 80% D1 lesion  # No reflow (DEMARCUS 0)  # IABP in place  # Dyslipidemia  TTE 6/12: Global and regional left ventricular function is normal with an EF of 60-65%.  Global right ventricular function is moderately reduced.The inferior vena cava is normal.  No pericardial effusion is present.  Angio 6/11: dRCA  70% stenosed, RPDA 50% stenosed.The RCA is a dominant artery withTIMI2 flow. It is stented from the ostium to the genu inferius, the stents are patent.   The RCA has a 70% lesion in its distal segment, that does not require emergency         treatment.   EKG: AFib, ST elevation in inferior leads  Temp Pacer back up VVI 50 rate, 10 mA, 0.50 sensing    Current Pressors/inotropes/antiarrythmic:  Norepinephrine off  Dopamine 10    Hemodynamics:  CVP 11, CI 2.3, , SVO2 52%    Plan:  -Dopamine for CI >2  -diurese  CAD:  - Continue ASA 81mg and ticagrelor 90mg BID for CAD  GDMT:  - Hold lipitor for now given shock liver  - Hold ACE/ARB for now given likely reduced renal fxn after arrest  - Holding beta blocker given shock  - Hold MRA due to likely reduced renal fx after arrest  - Hold SGLT2i due to likely reduced renal fx after arrest    Pulmonary  # Acute hypoxemic respiratory failure   RA  CXR: IABP, temp pacer, RIJ Lines in stable position. CLR.     Plan:  - Diurese  - Daily CXR   - Consider scheduled duonebs if signs of lung dz, currently PRN      Arterial blood gas:  Recent Labs   Lab 06/13/23  1526 06/13/23  1243 06/13/23  1037 06/12/23  0431 06/12/23  0427 06/12/23  0156 06/12/23  0052 06/11/23  2138   PH  --   --   --   --  7.40  --  7.36 7.34*   PCO2  --   --   --   --  29*  --  28* 32*   PO2  --   --   --   --  92  --  106* 99   HCO3  --   --   --   --  18*  --  16* 17*   O2PER 2 2 2   < > 37   < > 37 37    < > = values in this interval not displayed.       Gastrointestinal,  Nutrition  -Nutrition: Regular diet  -Last BM: None    Plan:  -Bowel regimen     Liver Function Studies   Recent Labs   Lab 06/13/23  1526 06/13/23  0403 06/12/23  1522   PROTTOTAL 5.5* 5.5* 5.5*   ALBUMIN 2.9* 3.0* 3.3*   BILITOTAL 0.6 0.7 0.4   ALKPHOS 54 50 46   * 270* 329*   ALT 91* 100* 122*       Renal, Electrolytes  # Acute Renal Injury  # Hypernatremia  # Hypoalbuminemia   # Lactic acidosis  -Baseline CR 0.80  I/O last 24 hours net +1613 ml, UO 1.2 ml  I/O since midnight net + 651 ml  CR 1.73  Plan:  -Monitor urine output  -Diurese according to CVP    Recent Labs   Lab 06/13/23  1526 06/13/23  0403 06/12/23  1522 06/12/23  1059 06/12/23  0428 06/12/23  0051 06/11/23  2138 06/11/23  1929 06/11/23  1311    133* 134*   < > 136 138 138   < > 138   POTASSIUM 3.8 3.7 3.7   < > 3.9 3.8 3.7  3.7   < > 3.7   CHLORIDE 104 100 101   < > 102 104 103   < > 104   CO2 22 21* 22   < > 15* 14* 14*   < > 16*   ANIONGAP 12 12 11   < > 19* 20* 21*   < > 18*   BUN 34.0* 32.4* 31.4*   < > 28.5* 26.4* 25.4*   < > 24.3*   CR 1.73* 1.69* 2.03*   < > 2.04* 1.83* 1.72*   < > 1.65*   GFRESTIMATED 40* 41* 33*   < > 33* 37* 40*   < > 42*   SUSAN 8.7* 8.6* 8.9   < > 9.1 8.6* 8.9   < > 9.3   MAG  --  2.4*  --   --  2.1 2.1 2.1  --  1.6*   PHOS  --   --   --   --   --  3.9 3.6  --  3.5    < > = values in this interval not displayed.       Infectious Disease  # Aspiration Pneumonia- in the setting of arrest  # Leukocytosis  WBC 18.3, TMAX 99  Lactic 1.6  Cultures thus far:   BLD NGTD   Urine NGTD   COVID Negative  Antibiotics              Zosyn 6/12-present  Plan:  -Monitor for signs of infection      Hematology  # Anemia of critical illness  Admission Hgb 13.4,   HGB 9.7, PLT 76  -4T intermediate probability +4  -discontinue all heparin, send HIT/JANIYA  -Start bivalirudin    Plan:  -discontinue heparin   -Bilvalirudin  -ASA/ticagrelor for PAULINE  -DVT PPX: bival    Recent Labs   Lab 06/13/23  1526 06/13/23  1037 06/12/23  0427  "  WBC 18.3* 19.9* 20.8*   RBC 3.42* 3.45* 3.98*   HGB 9.7* 9.9* 11.4*   PLT 76* 85* 186     Recent Labs   Lab 06/11/23  1311 06/11/23  0837   INR 1.18* 1.07   PTT >240* 28       Endocrinology  # Hyperglycemia   # Hgb A1c 5.6  No known medical history    Plan:  -Insulin gtt as needed    Integumentary:  - No skin issues    Plan  -early mobility protocol    Clinically Significant Risk Factors             # Anion Gap Metabolic Acidosis: Highest Anion Gap = 21 mmol/L in last 2 days, will monitor and treat as appropriate  # Hypoalbuminemia: Lowest albumin = 2.9 g/dL at 6/13/2023  3:26 PM, will monitor as appropriate  # Coagulation Defect: INR = 1.18 (Ref range: 0.85 - 1.15) and/or PTT = >240 Seconds (Ref range: 22 - 38 Seconds), will monitor for bleeding  # Thrombocytopenia: Lowest platelets = 76 in last 2 days, will monitor for bleeding   # Hypertension: Noted on problem list        # Obesity: Estimated body mass index is 33.21 kg/m  as calculated from the following:    Height as of an earlier encounter on 6/11/23: 1.753 m (5' 9\").    Weight as of this encounter: 102 kg (224 lb 13.9 oz)., PRESENT ON ADMISSION                  ICU Cares:  CXR: Daily  Fluids/Feeds: diuresing  DVT Prophylaxis: Bival infusion  GI Prophylaxis: NA  Bowel Regimen: senna   Anticipated Floor Transfer: NA    Lines/Tubes/Drains:  L 8 Fr femoral arterial sheath IABP  R 6 Fr femoral arterial sheath  R 6 Fr femoral venous sheath  R internal jugular CVC  External catheter  Current lines are required for patient management       Family updated by me    Patient seen, discussed, and plan reviewed with Dr. Giovanny Mendoza, DNP, APRN, CNP  [facility]  Pager [phone]     Physical exam:  General: In bed, restless, body hurts from activity restrictions  HEENT: PERRL, no scleral icterus or injection  CARDIAC: IRR HR, no m/r/g appreciated. Distal peripheral pulses dopplered  RESP: 8 L FM; CTAB, no wheezes, rhonchi or crackles appreciated.  GI: " soft, BS hypoactive. No BM  : Mckenzie  EXTREMITIES: No LE edema, pulses 2+. Femoral access site w/o bleeding, dressing c/d/i. No bruits appreciated.   SKIN: No acute lesions appreciated  NEURO: alert, disoriented today    BP (!) 88/51   Pulse 70   Temp 99.1  F (37.3  C)   Resp 28   Wt 102 kg (224 lb 13.9 oz)   SpO2 94%   BMI 33.21 kg/m    Temp:  [98.6  F (37  C)-99.9  F (37.7  C)] 99.1  F (37.3  C)  Pulse:  [] 70  Resp:  [15-35] 28  MAP:  [45 mmHg-134 mmHg] 47 mmHg  Arterial Line BP: ()/() 63/27  SpO2:  [89 %-95 %] 94 %     IO:    Intake/Output Summary (Last 24 hours) at 6/13/2023 1718  Last data filed at 6/13/2023 1700  Gross per 24 hour   Intake 3011.31 ml   Output 2129 ml   Net 882.31 ml         Labs:  Recent Labs   Lab 06/13/23  1526 06/13/23  0403 06/12/23  1522 06/12/23  1059 06/12/23  0428 06/12/23  0051 06/11/23  2138 06/11/23  1929 06/11/23  1311    133* 134*   < > 136 138 138   < > 138   POTASSIUM 3.8 3.7 3.7   < > 3.9 3.8 3.7  3.7   < > 3.7   CHLORIDE 104 100 101   < > 102 104 103   < > 104   CO2 22 21* 22   < > 15* 14* 14*   < > 16*   ANIONGAP 12 12 11   < > 19* 20* 21*   < > 18*   BUN 34.0* 32.4* 31.4*   < > 28.5* 26.4* 25.4*   < > 24.3*   CR 1.73* 1.69* 2.03*   < > 2.04* 1.83* 1.72*   < > 1.65*   GFRESTIMATED 40* 41* 33*   < > 33* 37* 40*   < > 42*   SUSAN 8.7* 8.6* 8.9   < > 9.1 8.6* 8.9   < > 9.3   MAG  --  2.4*  --   --  2.1 2.1 2.1  --  1.6*   PHOS  --   --   --   --   --  3.9 3.6  --  3.5    < > = values in this interval not displayed.     Recent Labs   Lab 06/13/23  1526 06/13/23  0403 06/12/23  1522   PROTTOTAL 5.5* 5.5* 5.5*   ALBUMIN 2.9* 3.0* 3.3*   BILITOTAL 0.6 0.7 0.4   ALKPHOS 54 50 46   * 270* 329*   ALT 91* 100* 122*        Recent Labs   Lab 06/13/23  1526 06/13/23  1037 06/12/23  0428   WBC 18.3* 19.9* 20.8*   RBC 3.42* 3.45* 3.98*   HGB 9.7* 9.9* 11.4*   HCT 29.5* 29.6* 34.5*   MCV 86 86 87   MCH 28.4 28.7 28.6   MCHC 32.9 33.4 33.0   RDW 15.4*  15.4* 15.0   PLT 76* 85* 186     Recent Labs   Lab 06/11/23  1311 06/11/23  0837   INR 1.18* 1.07     No lab results found in last 7 days.  Arterial Blood Gas   Recent Labs   Lab 06/13/23  1526 06/13/23  1243 06/13/23  1037 06/12/23  0431 06/12/23  0427 06/12/23  0156 06/12/23  0052 06/11/23  2138   PH  --   --   --   --  7.40  --  7.36 7.34*   PCO2  --   --   --   --  29*  --  28* 32*   PO2  --   --   --   --  92  --  106* 99   HCO3  --   --   --   --  18*  --  16* 17*   O2PER 2 2 2   < > 37   < > 37 37    < > = values in this interval not displayed.         Imaging/procedure results:  Cardiac Catheterization     Dist RCA lesion is 70% stenosed.     RPDA lesion is 50% stenosed.    The RCA is a dominant artery with TIMI2 flow. It is stented from the   ostium to the genu inferius, the stents are patent.   The RCA has a 70% lesion in its distal segment, that does not require   emergency treatment.   XR Chest Port 1 View  Narrative: Examination: XR CHEST PORT 1 VIEW 6/13/2023 1:23 AM    Indication: daily IABP check    Comparison: X-ray 6/12/2023    Findings:  AP supine portable chest. Right IJ CVC terminates over the cavoatrial  junction. Intra-aortic balloon pump marker projects at the level of  the anisa, stable. Partially visualized presumed temporary pacer  lead.    Trachea is midline. Cardiac silhouette is within normal limits. Stable  lung volumes. No appreciable pneumothorax or significant pleural  effusion. Slightly increased right basilar opacities. Otherwise,  stable streaky perihilar and basilar opacities. No focal airspace  consolidation. No acute osseous abnormality.  Impression: Impression:   1. Intra-aortic balloon pump marker in stable position at the level of  the anisa.  2. Right IJ CVC tip projects over the cavoatrial junction.  3. Stable lung volumes with mild increase in right basilar  atelectasis; otherwise similar mild scattered atelectasis.    I have personally reviewed the examination and  initial interpretation  and I agree with the findings.    VAN TOMPKINS MD         SYSTEM ID:  [id number]

## 2023-06-14 ENCOUNTER — APPOINTMENT (OUTPATIENT)
Dept: GENERAL RADIOLOGY | Facility: CLINIC | Age: 78
DRG: 270 | End: 2023-06-14
Attending: INTERNAL MEDICINE
Payer: COMMERCIAL

## 2023-06-14 LAB
ABO/RH(D): NORMAL
ALBUMIN SERPL BCG-MCNC: 2.9 G/DL (ref 3.5–5.2)
ALBUMIN SERPL BCG-MCNC: 3 G/DL (ref 3.5–5.2)
ALLEN'S TEST: ABNORMAL
ALP SERPL-CCNC: 50 U/L (ref 40–129)
ALP SERPL-CCNC: 57 U/L (ref 40–129)
ALT SERPL W P-5'-P-CCNC: 74 U/L (ref 0–70)
ALT SERPL W P-5'-P-CCNC: 75 U/L (ref 0–70)
ANION GAP SERPL CALCULATED.3IONS-SCNC: 10 MMOL/L (ref 7–15)
ANION GAP SERPL CALCULATED.3IONS-SCNC: 12 MMOL/L (ref 7–15)
ANION GAP SERPL CALCULATED.3IONS-SCNC: 14 MMOL/L (ref 7–15)
ANTIBODY SCREEN: NEGATIVE
APTT PPP: 65 SECONDS (ref 22–38)
APTT PPP: 72 SECONDS (ref 22–38)
AST SERPL W P-5'-P-CCNC: 122 U/L (ref 0–45)
AST SERPL W P-5'-P-CCNC: 152 U/L (ref 0–45)
ATRIAL RATE - MUSE: 78 BPM
ATRIAL RATE - MUSE: NORMAL BPM
ATRIAL RATE - MUSE: NORMAL BPM
BASE EXCESS BLDA CALC-SCNC: -0.9 MMOL/L (ref -9–1.8)
BASE EXCESS BLDV CALC-SCNC: 0 MMOL/L (ref -7.7–1.9)
BASE EXCESS BLDV CALC-SCNC: 0.1 MMOL/L (ref -7.7–1.9)
BASE EXCESS BLDV CALC-SCNC: 0.1 MMOL/L (ref -7.7–1.9)
BASE EXCESS BLDV CALC-SCNC: 0.3 MMOL/L (ref -7.7–1.9)
BASE EXCESS BLDV CALC-SCNC: 0.8 MMOL/L (ref -7.7–1.9)
BILIRUB SERPL-MCNC: 0.6 MG/DL
BILIRUB SERPL-MCNC: 0.6 MG/DL
BUN SERPL-MCNC: 36.7 MG/DL (ref 8–23)
BUN SERPL-MCNC: 37.7 MG/DL (ref 8–23)
BUN SERPL-MCNC: 39 MG/DL (ref 8–23)
CALCIUM SERPL-MCNC: 8.3 MG/DL (ref 8.8–10.2)
CALCIUM SERPL-MCNC: 8.3 MG/DL (ref 8.8–10.2)
CALCIUM SERPL-MCNC: 8.6 MG/DL (ref 8.8–10.2)
CHLORIDE SERPL-SCNC: 102 MMOL/L (ref 98–107)
CHLORIDE SERPL-SCNC: 102 MMOL/L (ref 98–107)
CHLORIDE SERPL-SCNC: 104 MMOL/L (ref 98–107)
CREAT SERPL-MCNC: 1.55 MG/DL (ref 0.67–1.17)
CREAT SERPL-MCNC: 1.73 MG/DL (ref 0.67–1.17)
CREAT SERPL-MCNC: 1.76 MG/DL (ref 0.67–1.17)
DEPRECATED HCO3 PLAS-SCNC: 17 MMOL/L (ref 22–29)
DEPRECATED HCO3 PLAS-SCNC: 20 MMOL/L (ref 22–29)
DEPRECATED HCO3 PLAS-SCNC: 23 MMOL/L (ref 22–29)
DIASTOLIC BLOOD PRESSURE - MUSE: NORMAL MMHG
ERYTHROCYTE [DISTWIDTH] IN BLOOD BY AUTOMATED COUNT: 15 % (ref 10–15)
ERYTHROCYTE [DISTWIDTH] IN BLOOD BY AUTOMATED COUNT: 15.1 % (ref 10–15)
GFR SERPL CREATININE-BSD FRML MDRD: 39 ML/MIN/1.73M2
GFR SERPL CREATININE-BSD FRML MDRD: 40 ML/MIN/1.73M2
GFR SERPL CREATININE-BSD FRML MDRD: 45 ML/MIN/1.73M2
GLUCOSE BLDC GLUCOMTR-MCNC: 106 MG/DL (ref 70–99)
GLUCOSE BLDC GLUCOMTR-MCNC: 107 MG/DL (ref 70–99)
GLUCOSE BLDC GLUCOMTR-MCNC: 109 MG/DL (ref 70–99)
GLUCOSE BLDC GLUCOMTR-MCNC: 111 MG/DL (ref 70–99)
GLUCOSE BLDC GLUCOMTR-MCNC: 115 MG/DL (ref 70–99)
GLUCOSE BLDC GLUCOMTR-MCNC: 116 MG/DL (ref 70–99)
GLUCOSE BLDC GLUCOMTR-MCNC: 117 MG/DL (ref 70–99)
GLUCOSE BLDC GLUCOMTR-MCNC: 118 MG/DL (ref 70–99)
GLUCOSE BLDC GLUCOMTR-MCNC: 118 MG/DL (ref 70–99)
GLUCOSE BLDC GLUCOMTR-MCNC: 120 MG/DL (ref 70–99)
GLUCOSE BLDC GLUCOMTR-MCNC: 122 MG/DL (ref 70–99)
GLUCOSE BLDC GLUCOMTR-MCNC: 127 MG/DL (ref 70–99)
GLUCOSE BLDC GLUCOMTR-MCNC: 129 MG/DL (ref 70–99)
GLUCOSE BLDC GLUCOMTR-MCNC: 143 MG/DL (ref 70–99)
GLUCOSE BLDC GLUCOMTR-MCNC: 144 MG/DL (ref 70–99)
GLUCOSE SERPL-MCNC: 103 MG/DL (ref 70–99)
GLUCOSE SERPL-MCNC: 116 MG/DL (ref 70–99)
GLUCOSE SERPL-MCNC: 120 MG/DL (ref 70–99)
HCO3 BLD-SCNC: 23 MMOL/L (ref 21–28)
HCO3 BLDV-SCNC: 24 MMOL/L (ref 21–28)
HCO3 BLDV-SCNC: 24 MMOL/L (ref 21–28)
HCO3 BLDV-SCNC: 25 MMOL/L (ref 21–28)
HCO3 BLDV-SCNC: 25 MMOL/L (ref 21–28)
HCO3 BLDV-SCNC: 26 MMOL/L (ref 21–28)
HCT VFR BLD AUTO: 27.2 % (ref 40–53)
HCT VFR BLD AUTO: 28.2 % (ref 40–53)
HGB BLD-MCNC: 8.8 G/DL (ref 13.3–17.7)
HGB BLD-MCNC: 9.2 G/DL (ref 13.3–17.7)
HOLD SPECIMEN HIT: NORMAL
INTERPRETATION ECG - MUSE: NORMAL
LACTATE SERPL-SCNC: 1 MMOL/L (ref 0.7–2)
LACTATE SERPL-SCNC: 1.1 MMOL/L (ref 0.7–2)
LACTATE SERPL-SCNC: 1.2 MMOL/L (ref 0.7–2)
LACTATE SERPL-SCNC: 1.8 MMOL/L (ref 0.7–2)
MAGNESIUM SERPL-MCNC: 2.2 MG/DL (ref 1.7–2.3)
MCH RBC QN AUTO: 28.1 PG (ref 26.5–33)
MCH RBC QN AUTO: 28.4 PG (ref 26.5–33)
MCHC RBC AUTO-ENTMCNC: 32.4 G/DL (ref 31.5–36.5)
MCHC RBC AUTO-ENTMCNC: 32.6 G/DL (ref 31.5–36.5)
MCV RBC AUTO: 87 FL (ref 78–100)
MCV RBC AUTO: 87 FL (ref 78–100)
O2/TOTAL GAS SETTING VFR VENT: 36 %
OXYHGB MFR BLD: 95 % (ref 92–100)
OXYHGB MFR BLDV: 47 % (ref 70–75)
OXYHGB MFR BLDV: 51 % (ref 70–75)
OXYHGB MFR BLDV: 53 % (ref 70–75)
OXYHGB MFR BLDV: 55 % (ref 70–75)
OXYHGB MFR BLDV: 59 % (ref 70–75)
P AXIS - MUSE: -78 DEGREES
P AXIS - MUSE: NORMAL DEGREES
P AXIS - MUSE: NORMAL DEGREES
PCO2 BLD: 33 MM HG (ref 35–45)
PCO2 BLDV: 37 MM HG (ref 40–50)
PCO2 BLDV: 38 MM HG (ref 40–50)
PCO2 BLDV: 39 MM HG (ref 40–50)
PCO2 BLDV: 39 MM HG (ref 40–50)
PCO2 BLDV: 42 MM HG (ref 40–50)
PF4 HEPARIN CMPLX AB SER QL: NEGATIVE
PH BLD: 7.45 [PH] (ref 7.35–7.45)
PH BLDV: 7.4 [PH] (ref 7.32–7.43)
PH BLDV: 7.41 [PH] (ref 7.32–7.43)
PH BLDV: 7.42 [PH] (ref 7.32–7.43)
PH BLDV: 7.42 [PH] (ref 7.32–7.43)
PH BLDV: 7.43 [PH] (ref 7.32–7.43)
PHOSPHATE SERPL-MCNC: 3.3 MG/DL (ref 2.5–4.5)
PLATELET # BLD AUTO: 76 10E3/UL (ref 150–450)
PLATELET # BLD AUTO: 77 10E3/UL (ref 150–450)
PO2 BLD: 79 MM HG (ref 80–105)
PO2 BLDV: 29 MM HG (ref 25–47)
PO2 BLDV: 30 MM HG (ref 25–47)
PO2 BLDV: 31 MM HG (ref 25–47)
PO2 BLDV: 32 MM HG (ref 25–47)
PO2 BLDV: 33 MM HG (ref 25–47)
POTASSIUM SERPL-SCNC: 4 MMOL/L (ref 3.4–5.3)
POTASSIUM SERPL-SCNC: 4 MMOL/L (ref 3.4–5.3)
POTASSIUM SERPL-SCNC: 4.1 MMOL/L (ref 3.4–5.3)
PR INTERVAL - MUSE: 270 MS
PR INTERVAL - MUSE: NORMAL MS
PR INTERVAL - MUSE: NORMAL MS
PROT SERPL-MCNC: 5.3 G/DL (ref 6.4–8.3)
PROT SERPL-MCNC: 5.8 G/DL (ref 6.4–8.3)
QRS DURATION - MUSE: 76 MS
QRS DURATION - MUSE: 84 MS
QRS DURATION - MUSE: 90 MS
QT - MUSE: 370 MS
QT - MUSE: 372 MS
QT - MUSE: 374 MS
QTC - MUSE: 385 MS
QTC - MUSE: 415 MS
QTC - MUSE: 421 MS
R AXIS - MUSE: -1 DEGREES
R AXIS - MUSE: 14 DEGREES
R AXIS - MUSE: 7 DEGREES
RBC # BLD AUTO: 3.13 10E6/UL (ref 4.4–5.9)
RBC # BLD AUTO: 3.24 10E6/UL (ref 4.4–5.9)
SODIUM SERPL-SCNC: 134 MMOL/L (ref 136–145)
SODIUM SERPL-SCNC: 135 MMOL/L (ref 136–145)
SODIUM SERPL-SCNC: 135 MMOL/L (ref 136–145)
SPECIMEN EXPIRATION DATE: NORMAL
SYSTOLIC BLOOD PRESSURE - MUSE: NORMAL MMHG
T AXIS - MUSE: 48 DEGREES
T AXIS - MUSE: 67 DEGREES
T AXIS - MUSE: 80 DEGREES
VENTRICULAR RATE- MUSE: 64 BPM
VENTRICULAR RATE- MUSE: 75 BPM
VENTRICULAR RATE- MUSE: 78 BPM
WBC # BLD AUTO: 13.9 10E3/UL (ref 4–11)
WBC # BLD AUTO: 16.2 10E3/UL (ref 4–11)

## 2023-06-14 PROCEDURE — 258N000003 HC RX IP 258 OP 636: Performed by: STUDENT IN AN ORGANIZED HEALTH CARE EDUCATION/TRAINING PROGRAM

## 2023-06-14 PROCEDURE — 86022 PLATELET ANTIBODIES: CPT

## 2023-06-14 PROCEDURE — 82805 BLOOD GASES W/O2 SATURATION: CPT | Performed by: STUDENT IN AN ORGANIZED HEALTH CARE EDUCATION/TRAINING PROGRAM

## 2023-06-14 PROCEDURE — 999N000157 HC STATISTIC RCP TIME EA 10 MIN

## 2023-06-14 PROCEDURE — 82805 BLOOD GASES W/O2 SATURATION: CPT

## 2023-06-14 PROCEDURE — 250N000011 HC RX IP 250 OP 636

## 2023-06-14 PROCEDURE — 250N000011 HC RX IP 250 OP 636: Performed by: INTERNAL MEDICINE

## 2023-06-14 PROCEDURE — 258N000003 HC RX IP 258 OP 636: Performed by: INTERNAL MEDICINE

## 2023-06-14 PROCEDURE — 83605 ASSAY OF LACTIC ACID: CPT | Performed by: STUDENT IN AN ORGANIZED HEALTH CARE EDUCATION/TRAINING PROGRAM

## 2023-06-14 PROCEDURE — 94660 CPAP INITIATION&MGMT: CPT

## 2023-06-14 PROCEDURE — 999N000155 HC STATISTIC RAPCV CVP MONITORING

## 2023-06-14 PROCEDURE — 93010 ELECTROCARDIOGRAM REPORT: CPT | Performed by: INTERNAL MEDICINE

## 2023-06-14 PROCEDURE — 250N000013 HC RX MED GY IP 250 OP 250 PS 637: Performed by: NURSE PRACTITIONER

## 2023-06-14 PROCEDURE — 250N000011 HC RX IP 250 OP 636: Performed by: STUDENT IN AN ORGANIZED HEALTH CARE EDUCATION/TRAINING PROGRAM

## 2023-06-14 PROCEDURE — 999N000075 HC STATISTIC IABP MONITORING

## 2023-06-14 PROCEDURE — 71045 X-RAY EXAM CHEST 1 VIEW: CPT | Mod: 26 | Performed by: RADIOLOGY

## 2023-06-14 PROCEDURE — 999N000015 HC STATISTIC ARTERIAL MONITORING DAILY

## 2023-06-14 PROCEDURE — 93005 ELECTROCARDIOGRAM TRACING: CPT

## 2023-06-14 PROCEDURE — 85730 THROMBOPLASTIN TIME PARTIAL: CPT

## 2023-06-14 PROCEDURE — 200N000002 HC R&B ICU UMMC

## 2023-06-14 PROCEDURE — 84155 ASSAY OF PROTEIN SERUM: CPT

## 2023-06-14 PROCEDURE — 84450 TRANSFERASE (AST) (SGOT): CPT

## 2023-06-14 PROCEDURE — 84100 ASSAY OF PHOSPHORUS: CPT | Performed by: INTERNAL MEDICINE

## 2023-06-14 PROCEDURE — 250N000013 HC RX MED GY IP 250 OP 250 PS 637: Performed by: STUDENT IN AN ORGANIZED HEALTH CARE EDUCATION/TRAINING PROGRAM

## 2023-06-14 PROCEDURE — 82805 BLOOD GASES W/O2 SATURATION: CPT | Performed by: INTERNAL MEDICINE

## 2023-06-14 PROCEDURE — 71045 X-RAY EXAM CHEST 1 VIEW: CPT

## 2023-06-14 PROCEDURE — 99291 CRITICAL CARE FIRST HOUR: CPT | Mod: FS | Performed by: STUDENT IN AN ORGANIZED HEALTH CARE EDUCATION/TRAINING PROGRAM

## 2023-06-14 PROCEDURE — 80053 COMPREHEN METABOLIC PANEL: CPT

## 2023-06-14 PROCEDURE — 250N000009 HC RX 250

## 2023-06-14 PROCEDURE — 86901 BLOOD TYPING SEROLOGIC RH(D): CPT | Performed by: INTERNAL MEDICINE

## 2023-06-14 PROCEDURE — 85027 COMPLETE CBC AUTOMATED: CPT

## 2023-06-14 PROCEDURE — 83735 ASSAY OF MAGNESIUM: CPT | Performed by: INTERNAL MEDICINE

## 2023-06-14 PROCEDURE — 80048 BASIC METABOLIC PNL TOTAL CA: CPT | Performed by: INTERNAL MEDICINE

## 2023-06-14 RX ORDER — FUROSEMIDE 10 MG/ML
INJECTION INTRAMUSCULAR; INTRAVENOUS
Status: DISCONTINUED
Start: 2023-06-14 | End: 2023-06-15 | Stop reason: HOSPADM

## 2023-06-14 RX ORDER — QUETIAPINE FUMARATE 50 MG/1
50 TABLET, FILM COATED ORAL AT BEDTIME
Status: DISCONTINUED | OUTPATIENT
Start: 2023-06-14 | End: 2023-06-20

## 2023-06-14 RX ORDER — POTASSIUM CHLORIDE 1.5 G/1.58G
20 POWDER, FOR SOLUTION ORAL 2 TIMES DAILY
Status: COMPLETED | OUTPATIENT
Start: 2023-06-14 | End: 2023-06-14

## 2023-06-14 RX ORDER — HALOPERIDOL 5 MG/ML
2 INJECTION INTRAMUSCULAR EVERY 6 HOURS PRN
Status: DISCONTINUED | OUTPATIENT
Start: 2023-06-14 | End: 2023-06-14

## 2023-06-14 RX ORDER — FUROSEMIDE 10 MG/ML
80 INJECTION INTRAMUSCULAR; INTRAVENOUS 3 TIMES DAILY
Status: COMPLETED | OUTPATIENT
Start: 2023-06-14 | End: 2023-06-14

## 2023-06-14 RX ORDER — QUETIAPINE FUMARATE 25 MG/1
25 TABLET, FILM COATED ORAL 2 TIMES DAILY
Status: DISCONTINUED | OUTPATIENT
Start: 2023-06-14 | End: 2023-06-16

## 2023-06-14 RX ORDER — DIGOXIN 0.06 MG/1
62.5 TABLET ORAL DAILY
Status: DISCONTINUED | OUTPATIENT
Start: 2023-06-15 | End: 2023-06-16

## 2023-06-14 RX ORDER — DIGOXIN 0.25 MG/ML
62.5 INJECTION INTRAMUSCULAR; INTRAVENOUS DAILY
Status: DISCONTINUED | OUTPATIENT
Start: 2023-06-14 | End: 2023-06-14

## 2023-06-14 RX ORDER — DEXMEDETOMIDINE HYDROCHLORIDE 4 UG/ML
.1-1.2 INJECTION, SOLUTION INTRAVENOUS CONTINUOUS
Status: DISCONTINUED | OUTPATIENT
Start: 2023-06-14 | End: 2023-06-15

## 2023-06-14 RX ADMIN — FUROSEMIDE 80 MG: 10 INJECTION, SOLUTION INTRAMUSCULAR; INTRAVENOUS at 14:00

## 2023-06-14 RX ADMIN — QUETIAPINE FUMARATE 25 MG: 25 TABLET ORAL at 01:14

## 2023-06-14 RX ADMIN — DEXMEDETOMIDINE HYDROCHLORIDE 1.2 MCG/KG/HR: 400 INJECTION INTRAVENOUS at 05:06

## 2023-06-14 RX ADMIN — ATORVASTATIN CALCIUM 80 MG: 80 TABLET, FILM COATED ORAL at 19:25

## 2023-06-14 RX ADMIN — DEXMEDETOMIDINE HYDROCHLORIDE 0.2 MCG/KG/HR: 400 INJECTION INTRAVENOUS at 02:27

## 2023-06-14 RX ADMIN — TICAGRELOR 90 MG: 90 TABLET ORAL at 08:22

## 2023-06-14 RX ADMIN — DOPAMINE HYDROCHLORIDE 8 MCG/KG/MIN: 40 INJECTION, SOLUTION, CONCENTRATE INTRAVENOUS at 20:54

## 2023-06-14 RX ADMIN — QUETIAPINE FUMARATE 50 MG: 50 TABLET ORAL at 19:25

## 2023-06-14 RX ADMIN — ACETAMINOPHEN 650 MG: 325 TABLET, FILM COATED ORAL at 01:14

## 2023-06-14 RX ADMIN — DEXMEDETOMIDINE HYDROCHLORIDE 1.2 MCG/KG/HR: 400 INJECTION INTRAVENOUS at 20:54

## 2023-06-14 RX ADMIN — POTASSIUM CHLORIDE 20 MEQ: 1.5 POWDER, FOR SOLUTION ORAL at 09:07

## 2023-06-14 RX ADMIN — QUETIAPINE FUMARATE 25 MG: 25 TABLET ORAL at 09:07

## 2023-06-14 RX ADMIN — DEXMEDETOMIDINE HYDROCHLORIDE 1.2 MCG/KG/HR: 400 INJECTION INTRAVENOUS at 18:04

## 2023-06-14 RX ADMIN — PIPERACILLIN AND TAZOBACTAM 3.38 G: 3; .375 INJECTION, POWDER, LYOPHILIZED, FOR SOLUTION INTRAVENOUS at 03:33

## 2023-06-14 RX ADMIN — PIPERACILLIN AND TAZOBACTAM 3.38 G: 3; .375 INJECTION, POWDER, LYOPHILIZED, FOR SOLUTION INTRAVENOUS at 14:50

## 2023-06-14 RX ADMIN — TICAGRELOR 90 MG: 90 TABLET ORAL at 19:25

## 2023-06-14 RX ADMIN — DEXMEDETOMIDINE HYDROCHLORIDE 1.2 MCG/KG/HR: 400 INJECTION INTRAVENOUS at 14:50

## 2023-06-14 RX ADMIN — FUROSEMIDE 80 MG: 10 INJECTION, SOLUTION INTRAMUSCULAR; INTRAVENOUS at 09:09

## 2023-06-14 RX ADMIN — PIPERACILLIN AND TAZOBACTAM 3.38 G: 3; .375 INJECTION, POWDER, LYOPHILIZED, FOR SOLUTION INTRAVENOUS at 09:08

## 2023-06-14 RX ADMIN — POTASSIUM CHLORIDE 20 MEQ: 1.5 POWDER, FOR SOLUTION ORAL at 19:25

## 2023-06-14 RX ADMIN — HALOPERIDOL LACTATE 2 MG: 5 INJECTION, SOLUTION INTRAMUSCULAR at 01:09

## 2023-06-14 RX ADMIN — DOPAMINE HYDROCHLORIDE 12 MCG/KG/MIN: 40 INJECTION, SOLUTION, CONCENTRATE INTRAVENOUS at 07:01

## 2023-06-14 RX ADMIN — DEXMEDETOMIDINE HYDROCHLORIDE 1.2 MCG/KG/HR: 400 INJECTION INTRAVENOUS at 11:34

## 2023-06-14 RX ADMIN — DEXMEDETOMIDINE HYDROCHLORIDE 1.2 MCG/KG/HR: 400 INJECTION INTRAVENOUS at 08:21

## 2023-06-14 RX ADMIN — PANTOPRAZOLE SODIUM 40 MG: 40 TABLET, DELAYED RELEASE ORAL at 08:22

## 2023-06-14 RX ADMIN — PIPERACILLIN AND TAZOBACTAM 3.38 G: 3; .375 INJECTION, POWDER, LYOPHILIZED, FOR SOLUTION INTRAVENOUS at 20:41

## 2023-06-14 RX ADMIN — Medication 10 MG: at 21:51

## 2023-06-14 RX ADMIN — ASPIRIN 81 MG: 81 TABLET ORAL at 08:22

## 2023-06-14 RX ADMIN — DIGOXIN 62.5 MCG: 250 INJECTION, SOLUTION INTRAMUSCULAR; INTRAVENOUS at 08:22

## 2023-06-14 RX ADMIN — FUROSEMIDE 80 MG: 10 INJECTION, SOLUTION INTRAMUSCULAR; INTRAVENOUS at 19:28

## 2023-06-14 ASSESSMENT — ACTIVITIES OF DAILY LIVING (ADL)
ADLS_ACUITY_SCORE: 34
ADLS_ACUITY_SCORE: 36
ADLS_ACUITY_SCORE: 32
ADLS_ACUITY_SCORE: 34

## 2023-06-14 NOTE — PROGRESS NOTES
Cardiology ICU Progress Note    Brief HPI:Kimani Glover is a 77 year old male who was admitted on 6/11/2023  following STEMI and PCI. Patient presented to [facility] ED after near syncope. Patient was reportedly with his family when he fell and almost lost consciousness. EMS was called and he was found to be lethargic with SBP 58 mmHg and bradycardic HR 30s. He was brought to [place] ED where he was found to be bradycardic 32 bpm and hypotensive. EKG showed inferior STEMI. He was started on dopamine and taken to the cath lab for emergent angiography.      Coronary angiogram showed occlusion of dominant RCA. PCI was attempted but was complicated by dissection and no-reflow. Temporary pace maker was placed. He was transferred to [facility] CICU for further management.      On arrival patient hemodynamically stable on 12 dopamine and 0.01 epinephrine. Patient comfortable without chest pain, shortness of breath, lightheadedness, dizziness, fevers, chills.     Interval changes:   -Hypotension overnight, back up on dopamine  - Started on low dose norepi  - Agitated requiring precedex, additional seroquel, Haldol  - Mckenzie reinserted with urinary retention  - No longer requiring temp pacemaker    Today's Plan:  - Scheduled Seroquel  - Continue Precedex  - Wean Dopamine 2 mcg/kg/min Q4H for CI > 2.2  - FBG (-) 500 ml to 1 L, lasix 80 TID  - Remove temp pacemaker  - Calorie count    ASSESSMENT AND PLAN BY SYSTEM:   Neurology   # Acute delirium  CAM (+), requring increasing medications for management of agitation    Current sedation:  RASS (Tellez Agitation-Sedation Scale): 1-->restless  Dose (mcg/kg/hr) Dexmedetomidine: 1.2 mcg/kg/hr    Plan:  - Seroquel scheduled 25-25-50  - Continue Precedex infusion    Cardiovascular  # Inferior STEMI  # RCA dissection with stenting of proximal to mid RCA  #Non-culprit 80% D1 lesion  # No reflow (DEMARCUS 0)  # IABP in place  # Dyslipidemia  TTE 6/12: Global and regional left ventricular  function is normal with an EF of 60-65%.  Global right ventricular function is moderately reduced.The inferior vena cava is normal.  No pericardial effusion is present.  Angio 6/11: dRCA  70% stenosed, RPDA 50% stenosed.The RCA is a dominant artery withTIMI2 flow. It is stented from the ostium to the genu inferius, the stents are patent.   The RCA has a 70% lesion in its distal segment, that does not require emergency         treatment.   ECG Rhythm: Accelerated junctional rhythm  Temp Pacer back up VVI 50 rate, 10 mA, 0.50 sensing    Current Pressors/inotropes/antiarrythmic:    Dopamine 12 mcg/kg/min and Dose (mcg/kg/min) Norepinephrine: 0.01 mcg/kg/min    Invasive Hemodynamic Monitoring:  CVP (mmHg): 11 mmHg    Hemodynamics:  Art Line  Arterial Line BP: 81/29  Arterial Line MAP (mmHg): 57 mmHg  Arterial Line Location: Right femoral  Art Line Wave Form: Dampened    Plan:  - Wean dopamine 2 mcg/kg/min Q4H for CI > 2.2  - FBG (-) 500 ml to 1 L, 80 mg lasix daily  - discontinue temp pacer  CAD:  - Continue ASA 81mg and ticagrelor 90mg BID for CAD  GDMT:  - Hold lipitor for now given shock liver  - Hold ACE/ARB for now given likely reduced renal fxn after arrest  - Holding beta blocker given shock  - Hold MRA due to likely reduced renal fx after arrest  - Hold SGLT2i due to likely reduced renal fx after arrest    Pulmonary  # Atelectasis  4 L NC  CXR: IABP, temp pacer, RIJ Lines in stable position. CLR.     Plan:  - Diuresis as above  - Daily CXR   - Pulm toileting  - Consider scheduled duonebs if signs of lung dz, currently PRN      Gastrointestinal, Nutrition  #Hypoalbuminemia  -Nutrition: Regular diet  -Last BM: 6/13    Plan:  - Bowel regimen as needed  - Regular diet  - Calorie count  - Protonix PUD ppx    Liver Function Studies   Recent Labs   Lab 06/14/23  0353 06/13/23  1526 06/13/23  0403   PROTTOTAL 5.3* 5.5* 5.5*   ALBUMIN 2.9* 2.9* 3.0*   BILITOTAL 0.6 0.6 0.7   ALKPHOS 50 54 50   * 199* 270*   ALT  75* 91* 100*       Renal, Electrolytes  # Acute Renal Injury  # Hyponatremia  -Baseline CR 0.80    Intake/Output Summary (Last 24 hours) at 2023 0855  Last data filed at 2023 0800  Gross per 24 hour   Intake 2618.43 ml   Output 2101 ml   Net 517.43 ml     Renal Labs  Recent Labs   Lab 23  0353 23  2044 23  1526 23  0403 23  1059 23  0428 23  0051 23  2138   *  --  138 133*   < > 136 138 138   POTASSIUM 4.1 3.8 3.8 3.7   < > 3.9 3.8 3.7  3.7   CHLORIDE 102  --  104 100   < > 102 104 103   CO2 20*  --  22 21*   < > 15* 14* 14*   BUN 36.7* 34.9* 34.0* 32.4*   < > 28.5* 26.4* 25.4*   CR 1.76* 1.72* 1.73* 1.69*   < > 2.04* 1.83* 1.72*   PHOS  --  2.9  --   --   --   --  3.9 3.6   MAG  --  2.3  --  2.4*  --  2.1 2.1 2.1    < > = values in this interval not displayed.     Plan:  - Monitor urine output  - Avoid nephrotoxins, renally dose meds  - Diuresis and FBG as above    Infectious Disease  # Aspiration Pneumonia  # Leukocytosis  Recent Labs   Lab 23  0353 23  1526 23  1037   WBC 16.2* 18.3* 19.9*     Temp (24hrs), Av.4  F (36.9  C), Min:97.9  F (36.6  C), Max:99.1  F (37.3  C)    Cultures thus far:    BCx NGTD; UCx NGTD; MRSA (-)    COVID Negative  Antibiotics              Zosyn -present  Plan:  - Continue Zosyn for 5D course  - Monitor for signs of infection    Hematology  # Anemia of critical illness  # C/F MARIA  Admission Hgb 13.4,     Hematology Labs  Recent Labs   Lab 23  0353 23  1526 23  1037   HGB 8.8* 9.7* 9.9*   HCT 27.2* 29.5* 29.6*   PLT 77* 76* 85*     Plan:  - MARIA pending  - Bivalirudin until MARIA finalized  - ASA/ticagrelor for PAULINE  - DVT PPX: bival    Endocrinology  # Hyperglycemia   # Hgb A1c 5.6  No known medical history    Dose (units/hr) Insulin: 1.5 Units/hr    Recent Labs   Lab 23  1313 23  1105 23  0906 23  0700 23  0559   * 127* 109*  117* 118*     Plan:  -Insulin gtt as needed    Integumentary:  - No skin issues    Plan  -early mobility protocol     ICU Cares:  CXR: Daily  Fluids/Feeds: reg diet  DVT Prophylaxis: Bival infusion  GI Prophylaxis: Protonix  Bowel Regimen: senna     Lines/Tubes/Drains:  L 8 Fr femoral arterial sheath IABP  R 6 Fr femoral arterial sheath  R 6 Fr femoral venous sheath  R internal jugular CVC  External catheter  Current lines are required for patient management       Family updated by me    Patient seen, discussed, and plan reviewed with MARIAH Suarez CNP  [facility]  ICU Cardiology-CICU Service    Physical exam:  General: In bed, restless, body hurts from activity restrictions  HEENT: PERRL, no scleral icterus or injection  CARDIAC: IRR HR, no m/r/g appreciated. Distal peripheral pulses dopplered  RESP: 4 L NC; CTAB, no wheezes, rhonchi or crackles appreciated.  GI: soft, BS hypoactive. No BM  : Mckenzie  EXTREMITIES: No LE edema, pulses 2+. Femoral access site w/o bleeding, dressing c/d/i. No bruits appreciated.   SKIN: No acute lesions appreciated  NEURO: alert, disoriented today    /80   Pulse 67   Temp 98  F (36.7  C) (Axillary)   Resp (!) 33   Wt 102 kg (224 lb 13.9 oz)   SpO2 94%   BMI 33.21 kg/m    Temp:  [97.9  F (36.6  C)-99.1  F (37.3  C)] 98  F (36.7  C)  Pulse:  [61-80] 67  Resp:  [17-40] 33  BP: ()/(46-80) 120/80  MAP:  [46 mmHg-206 mmHg] 57 mmHg  Arterial Line BP: ()/() 81/29  SpO2:  [88 %-98 %] 94 %    Labs:  Recent Labs   Lab 06/14/23  0353 06/13/23  2044 06/13/23  1526 06/13/23  0403 06/12/23  1059 06/12/23  0428 06/12/23  0051 06/11/23  2138   *  --  138 133*   < > 136 138 138   POTASSIUM 4.1 3.8 3.8 3.7   < > 3.9 3.8 3.7  3.7   CHLORIDE 102  --  104 100   < > 102 104 103   CO2 20*  --  22 21*   < > 15* 14* 14*   ANIONGAP 12  --  12 12   < > 19* 20* 21*   BUN 36.7* 34.9* 34.0* 32.4*   < > 28.5* 26.4* 25.4*   CR 1.76* 1.72* 1.73*  1.69*   < > 2.04* 1.83* 1.72*   GFRESTIMATED 39* 40* 40* 41*   < > 33* 37* 40*   SUSAN 8.3*  --  8.7* 8.6*   < > 9.1 8.6* 8.9   MAG  --  2.3  --  2.4*  --  2.1 2.1 2.1   PHOS  --  2.9  --   --   --   --  3.9 3.6    < > = values in this interval not displayed.     Recent Labs   Lab 06/14/23  0353 06/13/23  1526 06/13/23  0403   PROTTOTAL 5.3* 5.5* 5.5*   ALBUMIN 2.9* 2.9* 3.0*   BILITOTAL 0.6 0.6 0.7   ALKPHOS 50 54 50   * 199* 270*   ALT 75* 91* 100*        Recent Labs   Lab 06/14/23  0353 06/13/23  1526 06/13/23  1037   WBC 16.2* 18.3* 19.9*   RBC 3.13* 3.42* 3.45*   HGB 8.8* 9.7* 9.9*   HCT 27.2* 29.5* 29.6*   MCV 87 86 86   MCH 28.1 28.4 28.7   MCHC 32.4 32.9 33.4   RDW 15.1* 15.4* 15.4*   PLT 77* 76* 85*     Recent Labs   Lab 06/11/23  1311 06/11/23  0837   INR 1.18* 1.07     No lab results found in last 7 days.  Arterial Blood Gas   Recent Labs   Lab 06/14/23  0353 06/13/23  2202 06/13/23  1654 06/12/23  0431 06/12/23  0427 06/12/23  0156 06/12/23  0052 06/11/23  2138   PH  --   --   --   --  7.40  --  7.36 7.34*   PCO2  --   --   --   --  29*  --  28* 32*   PO2  --   --   --   --  92  --  106* 99   HCO3  --   --   --   --  18*  --  16* 17*   O2PER 36 28 2   < > 37   < > 37 37    < > = values in this interval not displayed.         Imaging/procedure results:  XR Chest Port 1 View  RESIDENT PRELIMINARY INTERPRETATION  Impression:   1. Stable positioning of intra-aortic balloon pump marker.  2. Ongoing mild perihilar edema and retrocardiac atelectasis.

## 2023-06-14 NOTE — PLAN OF CARE
Major Shift Events:  Alert to self and very delirious, needs constant reorientation and redirections, accelerated junctional rhythm, on 1:1 IABP, dobutamine titrated down per CSI, on 4L )2, diuresed x2 with adequate response, insulin gtt titrated per algorithm, tolereating regular diet and swallowing pills,       Plan: continue to monitor IABP    For vital signs and complete assessments, please see documentation flowsheets.

## 2023-06-15 ENCOUNTER — RESULTS ONLY (OUTPATIENT)
Dept: ADMINISTRATIVE | Facility: CLINIC | Age: 78
End: 2023-06-15
Payer: COMMERCIAL

## 2023-06-15 ENCOUNTER — APPOINTMENT (OUTPATIENT)
Dept: GENERAL RADIOLOGY | Facility: CLINIC | Age: 78
DRG: 270 | End: 2023-06-15
Attending: INTERNAL MEDICINE
Payer: COMMERCIAL

## 2023-06-15 LAB
ALBUMIN SERPL BCG-MCNC: 2.8 G/DL (ref 3.5–5.2)
ALBUMIN SERPL BCG-MCNC: 3 G/DL (ref 3.5–5.2)
ALP SERPL-CCNC: 62 U/L (ref 40–129)
ALP SERPL-CCNC: 67 U/L (ref 40–129)
ALT SERPL W P-5'-P-CCNC: 59 U/L (ref 0–70)
ALT SERPL W P-5'-P-CCNC: 64 U/L (ref 0–70)
ANION GAP SERPL CALCULATED.3IONS-SCNC: 15 MMOL/L (ref 7–15)
ANION GAP SERPL CALCULATED.3IONS-SCNC: 15 MMOL/L (ref 7–15)
APTT PPP: 56 SECONDS (ref 22–38)
AST SERPL W P-5'-P-CCNC: 80 U/L (ref 0–45)
AST SERPL W P-5'-P-CCNC: 93 U/L (ref 0–45)
ATRIAL RATE - MUSE: NORMAL BPM
BASE EXCESS BLDV CALC-SCNC: -0.4 MMOL/L (ref -7.7–1.9)
BASE EXCESS BLDV CALC-SCNC: -1.4 MMOL/L (ref -7.7–1.9)
BASE EXCESS BLDV CALC-SCNC: -1.5 MMOL/L (ref -7.7–1.9)
BASE EXCESS BLDV CALC-SCNC: -1.7 MMOL/L (ref -7.7–1.9)
BASE EXCESS BLDV CALC-SCNC: -4.6 MMOL/L (ref -7.7–1.9)
BASE EXCESS BLDV CALC-SCNC: -5.7 MMOL/L (ref -7.7–1.9)
BASE EXCESS BLDV CALC-SCNC: 0.5 MMOL/L (ref -7.7–1.9)
BILIRUB SERPL-MCNC: 0.6 MG/DL
BILIRUB SERPL-MCNC: 0.8 MG/DL
BUN SERPL-MCNC: 41.6 MG/DL (ref 8–23)
BUN SERPL-MCNC: 42.1 MG/DL (ref 8–23)
CALCIUM SERPL-MCNC: 8.4 MG/DL (ref 8.8–10.2)
CALCIUM SERPL-MCNC: 8.6 MG/DL (ref 8.8–10.2)
CHLORIDE SERPL-SCNC: 102 MMOL/L (ref 98–107)
CHLORIDE SERPL-SCNC: 99 MMOL/L (ref 98–107)
CREAT SERPL-MCNC: 1.7 MG/DL (ref 0.67–1.17)
CREAT SERPL-MCNC: 1.82 MG/DL (ref 0.67–1.17)
DEPRECATED HCO3 PLAS-SCNC: 18 MMOL/L (ref 22–29)
DEPRECATED HCO3 PLAS-SCNC: 19 MMOL/L (ref 22–29)
DIASTOLIC BLOOD PRESSURE - MUSE: NORMAL MMHG
DIGOXIN SERPL-MCNC: 0.7 NG/ML (ref 0.6–2)
ERYTHROCYTE [DISTWIDTH] IN BLOOD BY AUTOMATED COUNT: 14.9 % (ref 10–15)
ERYTHROCYTE [DISTWIDTH] IN BLOOD BY AUTOMATED COUNT: 15 % (ref 10–15)
GFR SERPL CREATININE-BSD FRML MDRD: 37 ML/MIN/1.73M2
GFR SERPL CREATININE-BSD FRML MDRD: 41 ML/MIN/1.73M2
GLUCOSE BLDC GLUCOMTR-MCNC: 103 MG/DL (ref 70–99)
GLUCOSE BLDC GLUCOMTR-MCNC: 122 MG/DL (ref 70–99)
GLUCOSE BLDC GLUCOMTR-MCNC: 132 MG/DL (ref 70–99)
GLUCOSE BLDC GLUCOMTR-MCNC: 141 MG/DL (ref 70–99)
GLUCOSE BLDC GLUCOMTR-MCNC: 195 MG/DL (ref 70–99)
GLUCOSE BLDC GLUCOMTR-MCNC: 206 MG/DL (ref 70–99)
GLUCOSE BLDC GLUCOMTR-MCNC: 226 MG/DL (ref 70–99)
GLUCOSE BLDC GLUCOMTR-MCNC: 239 MG/DL (ref 70–99)
GLUCOSE BLDC GLUCOMTR-MCNC: 93 MG/DL (ref 70–99)
GLUCOSE SERPL-MCNC: 129 MG/DL (ref 70–99)
GLUCOSE SERPL-MCNC: 196 MG/DL (ref 70–99)
HCO3 BLDV-SCNC: 18 MMOL/L (ref 21–28)
HCO3 BLDV-SCNC: 19 MMOL/L (ref 21–28)
HCO3 BLDV-SCNC: 21 MMOL/L (ref 21–28)
HCO3 BLDV-SCNC: 22 MMOL/L (ref 21–28)
HCO3 BLDV-SCNC: 22 MMOL/L (ref 21–28)
HCO3 BLDV-SCNC: 23 MMOL/L (ref 21–28)
HCO3 BLDV-SCNC: 23 MMOL/L (ref 21–28)
HCT VFR BLD AUTO: 25.8 % (ref 40–53)
HCT VFR BLD AUTO: 26.9 % (ref 40–53)
HGB BLD-MCNC: 8.3 G/DL (ref 13.3–17.7)
HGB BLD-MCNC: 8.6 G/DL (ref 13.3–17.7)
INTERPRETATION ECG - MUSE: NORMAL
LACTATE SERPL-SCNC: 1.3 MMOL/L (ref 0.7–2)
LACTATE SERPL-SCNC: 1.4 MMOL/L (ref 0.7–2)
LACTATE SERPL-SCNC: 1.7 MMOL/L (ref 0.7–2)
LACTATE SERPL-SCNC: 2 MMOL/L (ref 0.7–2)
MAGNESIUM SERPL-MCNC: 2 MG/DL (ref 1.7–2.3)
MCH RBC QN AUTO: 27.8 PG (ref 26.5–33)
MCH RBC QN AUTO: 28.1 PG (ref 26.5–33)
MCHC RBC AUTO-ENTMCNC: 32 G/DL (ref 31.5–36.5)
MCHC RBC AUTO-ENTMCNC: 32.2 G/DL (ref 31.5–36.5)
MCV RBC AUTO: 86 FL (ref 78–100)
MCV RBC AUTO: 88 FL (ref 78–100)
O2/TOTAL GAS SETTING VFR VENT: 21 %
O2/TOTAL GAS SETTING VFR VENT: 36 %
OXYHGB MFR BLDV: 50 % (ref 70–75)
OXYHGB MFR BLDV: 52 % (ref 70–75)
OXYHGB MFR BLDV: 52 % (ref 70–75)
OXYHGB MFR BLDV: 55 % (ref 70–75)
OXYHGB MFR BLDV: 56 % (ref 70–75)
OXYHGB MFR BLDV: 60 % (ref 70–75)
OXYHGB MFR BLDV: 61 % (ref 70–75)
P AXIS - MUSE: NORMAL DEGREES
PCO2 BLDV: 28 MM HG (ref 40–50)
PCO2 BLDV: 29 MM HG (ref 40–50)
PCO2 BLDV: 30 MM HG (ref 40–50)
PCO2 BLDV: 30 MM HG (ref 40–50)
PCO2 BLDV: 31 MM HG (ref 40–50)
PCO2 BLDV: 33 MM HG (ref 40–50)
PCO2 BLDV: 34 MM HG (ref 40–50)
PH BLDV: 7.4 [PH] (ref 7.32–7.43)
PH BLDV: 7.42 [PH] (ref 7.32–7.43)
PH BLDV: 7.43 [PH] (ref 7.32–7.43)
PH BLDV: 7.44 [PH] (ref 7.32–7.43)
PH BLDV: 7.46 [PH] (ref 7.32–7.43)
PH BLDV: 7.49 [PH] (ref 7.32–7.43)
PH BLDV: 7.5 [PH] (ref 7.32–7.43)
PHOSPHATE SERPL-MCNC: 3.4 MG/DL (ref 2.5–4.5)
PLATELET # BLD AUTO: 82 10E3/UL (ref 150–450)
PLATELET # BLD AUTO: 84 10E3/UL (ref 150–450)
PO2 BLDV: 29 MM HG (ref 25–47)
PO2 BLDV: 30 MM HG (ref 25–47)
PO2 BLDV: 30 MM HG (ref 25–47)
PO2 BLDV: 31 MM HG (ref 25–47)
PO2 BLDV: 31 MM HG (ref 25–47)
PO2 BLDV: 35 MM HG (ref 25–47)
PO2 BLDV: 36 MM HG (ref 25–47)
POTASSIUM SERPL-SCNC: 3.2 MMOL/L (ref 3.4–5.3)
POTASSIUM SERPL-SCNC: 3.2 MMOL/L (ref 3.4–5.3)
POTASSIUM SERPL-SCNC: 3.9 MMOL/L (ref 3.4–5.3)
PR INTERVAL - MUSE: NORMAL MS
PROT SERPL-MCNC: 5.5 G/DL (ref 6.4–8.3)
PROT SERPL-MCNC: 5.6 G/DL (ref 6.4–8.3)
QRS DURATION - MUSE: 50 MS
QRS DURATION - MUSE: 88 MS
QRS DURATION - MUSE: 88 MS
QT - MUSE: 418 MS
QT - MUSE: 424 MS
QT - MUSE: 448 MS
QTC - MUSE: 365 MS
QTC - MUSE: 402 MS
QTC - MUSE: 454 MS
R AXIS - MUSE: -2 DEGREES
R AXIS - MUSE: -8 DEGREES
R AXIS - MUSE: 14 DEGREES
RBC # BLD AUTO: 2.99 10E6/UL (ref 4.4–5.9)
RBC # BLD AUTO: 3.06 10E6/UL (ref 4.4–5.9)
SODIUM SERPL-SCNC: 132 MMOL/L (ref 136–145)
SODIUM SERPL-SCNC: 136 MMOL/L (ref 136–145)
SYSTOLIC BLOOD PRESSURE - MUSE: NORMAL MMHG
T AXIS - MUSE: 42 DEGREES
T AXIS - MUSE: 76 DEGREES
T AXIS - MUSE: 82 DEGREES
UFH PPP CHRO-ACNC: 0.16 IU/ML
VENTRICULAR RATE- MUSE: 40 BPM
VENTRICULAR RATE- MUSE: 54 BPM
VENTRICULAR RATE- MUSE: 71 BPM
WBC # BLD AUTO: 11.2 10E3/UL (ref 4–11)
WBC # BLD AUTO: 11.8 10E3/UL (ref 4–11)

## 2023-06-15 PROCEDURE — 71045 X-RAY EXAM CHEST 1 VIEW: CPT

## 2023-06-15 PROCEDURE — 93005 ELECTROCARDIOGRAM TRACING: CPT

## 2023-06-15 PROCEDURE — 200N000002 HC R&B ICU UMMC

## 2023-06-15 PROCEDURE — 82310 ASSAY OF CALCIUM: CPT | Performed by: INTERNAL MEDICINE

## 2023-06-15 PROCEDURE — 250N000013 HC RX MED GY IP 250 OP 250 PS 637: Performed by: STUDENT IN AN ORGANIZED HEALTH CARE EDUCATION/TRAINING PROGRAM

## 2023-06-15 PROCEDURE — 250N000013 HC RX MED GY IP 250 OP 250 PS 637: Performed by: INTERNAL MEDICINE

## 2023-06-15 PROCEDURE — 250N000011 HC RX IP 250 OP 636: Performed by: STUDENT IN AN ORGANIZED HEALTH CARE EDUCATION/TRAINING PROGRAM

## 2023-06-15 PROCEDURE — 71045 X-RAY EXAM CHEST 1 VIEW: CPT | Mod: 26 | Performed by: RADIOLOGY

## 2023-06-15 PROCEDURE — 999N000075 HC STATISTIC IABP MONITORING

## 2023-06-15 PROCEDURE — 85730 THROMBOPLASTIN TIME PARTIAL: CPT

## 2023-06-15 PROCEDURE — 84450 TRANSFERASE (AST) (SGOT): CPT

## 2023-06-15 PROCEDURE — 93010 ELECTROCARDIOGRAM REPORT: CPT | Performed by: INTERNAL MEDICINE

## 2023-06-15 PROCEDURE — 999N000155 HC STATISTIC RAPCV CVP MONITORING

## 2023-06-15 PROCEDURE — 250N000011 HC RX IP 250 OP 636: Performed by: INTERNAL MEDICINE

## 2023-06-15 PROCEDURE — 80162 ASSAY OF DIGOXIN TOTAL: CPT | Performed by: INTERNAL MEDICINE

## 2023-06-15 PROCEDURE — 99291 CRITICAL CARE FIRST HOUR: CPT | Mod: FS | Performed by: STUDENT IN AN ORGANIZED HEALTH CARE EDUCATION/TRAINING PROGRAM

## 2023-06-15 PROCEDURE — 85027 COMPLETE CBC AUTOMATED: CPT

## 2023-06-15 PROCEDURE — 250N000011 HC RX IP 250 OP 636: Performed by: NURSE PRACTITIONER

## 2023-06-15 PROCEDURE — 82805 BLOOD GASES W/O2 SATURATION: CPT | Performed by: STUDENT IN AN ORGANIZED HEALTH CARE EDUCATION/TRAINING PROGRAM

## 2023-06-15 PROCEDURE — 250N000013 HC RX MED GY IP 250 OP 250 PS 637: Performed by: NURSE PRACTITIONER

## 2023-06-15 PROCEDURE — 83735 ASSAY OF MAGNESIUM: CPT | Performed by: INTERNAL MEDICINE

## 2023-06-15 PROCEDURE — 82435 ASSAY OF BLOOD CHLORIDE: CPT

## 2023-06-15 PROCEDURE — 250N000011 HC RX IP 250 OP 636

## 2023-06-15 PROCEDURE — 83605 ASSAY OF LACTIC ACID: CPT | Performed by: STUDENT IN AN ORGANIZED HEALTH CARE EDUCATION/TRAINING PROGRAM

## 2023-06-15 PROCEDURE — 99232 SBSQ HOSP IP/OBS MODERATE 35: CPT | Mod: FS | Performed by: NURSE PRACTITIONER

## 2023-06-15 PROCEDURE — 85520 HEPARIN ASSAY: CPT | Performed by: NURSE PRACTITIONER

## 2023-06-15 PROCEDURE — 250N000009 HC RX 250

## 2023-06-15 PROCEDURE — 84100 ASSAY OF PHOSPHORUS: CPT | Performed by: INTERNAL MEDICINE

## 2023-06-15 PROCEDURE — 85520 HEPARIN ASSAY: CPT | Performed by: STUDENT IN AN ORGANIZED HEALTH CARE EDUCATION/TRAINING PROGRAM

## 2023-06-15 PROCEDURE — 93010 ELECTROCARDIOGRAM REPORT: CPT | Mod: 59 | Performed by: INTERNAL MEDICINE

## 2023-06-15 PROCEDURE — 258N000003 HC RX IP 258 OP 636: Performed by: INTERNAL MEDICINE

## 2023-06-15 PROCEDURE — 84132 ASSAY OF SERUM POTASSIUM: CPT | Performed by: INTERNAL MEDICINE

## 2023-06-15 RX ORDER — POTASSIUM CHLORIDE 1.5 G/1.58G
40 POWDER, FOR SOLUTION ORAL ONCE
Status: COMPLETED | OUTPATIENT
Start: 2023-06-15 | End: 2023-06-15

## 2023-06-15 RX ORDER — FUROSEMIDE 10 MG/ML
80 INJECTION INTRAMUSCULAR; INTRAVENOUS EVERY 6 HOURS
Status: DISCONTINUED | OUTPATIENT
Start: 2023-06-15 | End: 2023-06-15

## 2023-06-15 RX ORDER — NICOTINE POLACRILEX 4 MG
15-30 LOZENGE BUCCAL
Status: DISCONTINUED | OUTPATIENT
Start: 2023-06-15 | End: 2023-06-15

## 2023-06-15 RX ORDER — DOBUTAMINE HYDROCHLORIDE 200 MG/100ML
2.5 INJECTION INTRAVENOUS CONTINUOUS
Status: DISCONTINUED | OUTPATIENT
Start: 2023-06-15 | End: 2023-06-17

## 2023-06-15 RX ORDER — FUROSEMIDE 10 MG/ML
80 INJECTION INTRAMUSCULAR; INTRAVENOUS EVERY 6 HOURS
Status: COMPLETED | OUTPATIENT
Start: 2023-06-15 | End: 2023-06-15

## 2023-06-15 RX ORDER — HEPARIN SODIUM 10000 [USP'U]/100ML
0-5000 INJECTION, SOLUTION INTRAVENOUS CONTINUOUS
Status: DISCONTINUED | OUTPATIENT
Start: 2023-06-15 | End: 2023-06-19

## 2023-06-15 RX ORDER — DEXTROSE MONOHYDRATE 25 G/50ML
25-50 INJECTION, SOLUTION INTRAVENOUS
Status: DISCONTINUED | OUTPATIENT
Start: 2023-06-15 | End: 2023-06-15

## 2023-06-15 RX ORDER — MAGNESIUM SULFATE HEPTAHYDRATE 40 MG/ML
2 INJECTION, SOLUTION INTRAVENOUS ONCE
Status: COMPLETED | OUTPATIENT
Start: 2023-06-15 | End: 2023-06-15

## 2023-06-15 RX ADMIN — DOBUTAMINE HYDROCHLORIDE 2.5 MCG/KG/MIN: 200 INJECTION INTRAVENOUS at 10:39

## 2023-06-15 RX ADMIN — PIPERACILLIN AND TAZOBACTAM 3.38 G: 3; .375 INJECTION, POWDER, LYOPHILIZED, FOR SOLUTION INTRAVENOUS at 02:19

## 2023-06-15 RX ADMIN — QUETIAPINE FUMARATE 25 MG: 25 TABLET ORAL at 08:02

## 2023-06-15 RX ADMIN — POTASSIUM CHLORIDE 40 MEQ: 1.5 POWDER, FOR SOLUTION ORAL at 17:29

## 2023-06-15 RX ADMIN — HEPARIN SODIUM 1200 UNITS/HR: 10000 INJECTION, SOLUTION INTRAVENOUS at 11:44

## 2023-06-15 RX ADMIN — ACETAMINOPHEN 650 MG: 325 TABLET, FILM COATED ORAL at 08:02

## 2023-06-15 RX ADMIN — ATORVASTATIN CALCIUM 80 MG: 80 TABLET, FILM COATED ORAL at 19:22

## 2023-06-15 RX ADMIN — FUROSEMIDE 80 MG: 10 INJECTION, SOLUTION INTRAVENOUS at 14:09

## 2023-06-15 RX ADMIN — BIVALIRUDIN 0.05 MG/KG/HR: 250 INJECTION, POWDER, LYOPHILIZED, FOR SOLUTION INTRAVENOUS at 08:35

## 2023-06-15 RX ADMIN — PIPERACILLIN AND TAZOBACTAM 3.38 G: 3; .375 INJECTION, POWDER, LYOPHILIZED, FOR SOLUTION INTRAVENOUS at 08:07

## 2023-06-15 RX ADMIN — DIGOXIN 62.5 MCG: 0.06 TABLET ORAL at 08:23

## 2023-06-15 RX ADMIN — QUETIAPINE FUMARATE 50 MG: 50 TABLET ORAL at 19:22

## 2023-06-15 RX ADMIN — ACETAMINOPHEN 650 MG: 325 TABLET, FILM COATED ORAL at 23:51

## 2023-06-15 RX ADMIN — PIPERACILLIN AND TAZOBACTAM 3.38 G: 3; .375 INJECTION, POWDER, LYOPHILIZED, FOR SOLUTION INTRAVENOUS at 15:23

## 2023-06-15 RX ADMIN — ASPIRIN 81 MG: 81 TABLET ORAL at 08:02

## 2023-06-15 RX ADMIN — Medication 10 MG: at 21:59

## 2023-06-15 RX ADMIN — TICAGRELOR 90 MG: 90 TABLET ORAL at 08:02

## 2023-06-15 RX ADMIN — MAGNESIUM SULFATE IN WATER 2 G: 40 INJECTION, SOLUTION INTRAVENOUS at 05:18

## 2023-06-15 RX ADMIN — Medication 0.03 MCG/KG/MIN: at 23:16

## 2023-06-15 RX ADMIN — ACETAMINOPHEN 650 MG: 325 TABLET, FILM COATED ORAL at 13:56

## 2023-06-15 RX ADMIN — PIPERACILLIN AND TAZOBACTAM 3.38 G: 3; .375 INJECTION, POWDER, LYOPHILIZED, FOR SOLUTION INTRAVENOUS at 20:17

## 2023-06-15 RX ADMIN — PANTOPRAZOLE SODIUM 40 MG: 40 TABLET, DELAYED RELEASE ORAL at 08:02

## 2023-06-15 RX ADMIN — TICAGRELOR 90 MG: 90 TABLET ORAL at 19:23

## 2023-06-15 RX ADMIN — FUROSEMIDE 80 MG: 10 INJECTION, SOLUTION INTRAVENOUS at 08:59

## 2023-06-15 ASSESSMENT — ACTIVITIES OF DAILY LIVING (ADL)
ADLS_ACUITY_SCORE: 32

## 2023-06-15 NOTE — PROGRESS NOTES
Cardiology ICU Progress Note    Brief HPI:Kimani Glover is a 77 year old male who was admitted on 6/11/2023  following STEMI and PCI. Patient presented to [facility] ED after near syncope. Patient was reportedly with his family when he fell and almost lost consciousness. EMS was called and he was found to be lethargic with SBP 58 mmHg and bradycardic HR 30s. He was brought to [place] ED where he was found to be bradycardic 32 bpm and hypotensive. EKG showed inferior STEMI. He was started on dopamine and taken to the cath lab for emergent angiography.      Coronary angiogram showed occlusion of dominant RCA. PCI was attempted but was complicated by dissection and no-reflow. Temporary pace maker was placed. He was transferred to [facility] CICU for further management.      On arrival patient hemodynamically stable on 12 dopamine and 0.01 epinephrine. Patient comfortable without chest pain, shortness of breath, lightheadedness, dizziness, fevers, chills.     Interval changes:   - Currently rate controlled a-fib  - Dopamine weaned down to 8  - Repeated sinus pauses in morning  - Increasing hypotension  - MARIA negative    Today's Plan:  - Stop precedex and digoxin with pauses  - Re engage EP  - Add dobutamine, continue to wean dopamine  - FBG (-) 1 L, 80 lasix BID  - Bival to heparin    ASSESSMENT AND PLAN BY SYSTEM:   Neurology   # Acute delirium  CAM (+), requring medications for management of agitation, improving today.    Current sedation:  RASS (Tellez Agitation-Sedation Scale): -1-->drowsy  Dose (mcg/kg/hr) Dexmedetomidine: 0 mcg/kg/hr (Stopped d/t bradycardia. HR < 30 briefly.)    Plan:  - Seroquel scheduled 25-25-50  - Stop Precedex infusion  - Encourage day night cycles    Cardiovascular  # Inferior STEMI  # RCA dissection with stenting of proximal to mid RCA  #Non-culprit 80% D1 lesion  # No reflow (DEMARCUS 0)  # IABP in place  # Dyslipidemia  # A-fib  TTE 6/12: Global and regional left ventricular function is  normal with an EF of 60-65%.  Global right ventricular function is moderately reduced.The inferior vena cava is normal.  No pericardial effusion is present.  Angio 6/11: dRCA  70% stenosed, RPDA 50% stenosed.The RCA is a dominant artery withTIMI2 flow. It is stented from the ostium to the genu inferius, the stents are patent.   The RCA has a 70% lesion in its distal segment, that does not require emergency         treatment.   ECG Rhythm: Atrial fibrillation  Temp Pacer back up VVI 50 rate, 10 mA, 0.50 sensing    Current Pressors/inotropes/antiarrythmic:    Dopamine 8 mcg/kg/min and Dose (mcg/kg/min) Norepinephrine: 0.05 mcg/kg/min    Invasive Hemodynamic Monitoring:  CVP (mmHg): 13 mmHg    Hemodynamics:  Art Line  Arterial Line BP: 82/45  Arterial Line MAP (mmHg): 74 mmHg  Arterial Line Location: Right femoral  Art Line Wave Form: Dampened    Plan:  - Add dobutamine 2.5 for inotropy, may go up if needing more inotropy  - Wean dopamine 2 mcg/kg/min Q4H for CI > 2.2  - FBG (-) 500 ml to 1 L, 80 mg lasix BID  - Heparin infusion for a-fib  CAD:  - Continue ASA 81mg and ticagrelor 90mg BID for CAD  GDMT:  - Hold lipitor for now given shock liver  - Hold ACE/ARB for now given likely reduced renal fxn after arrest  - Holding beta blocker given shock  - Hold MRA due to likely reduced renal fx after arrest  - Hold SGLT2i due to likely reduced renal fx after arrest    Pulmonary  # Atelectasis  # THONG  4 L NC  CXR: IABP, PICC in stable position. CLR.     Plan:  - Diuresis as above  - Daily CXR   - Home CPAP at night  - Pulm toileting  - Consider scheduled duonebs if signs of lung dz, currently PRN      Gastrointestinal, Nutrition  #Hypoalbuminemia  -Nutrition: Regular diet  -Last BM: 6/14    Plan:  - Bowel regimen as needed  - Regular diet  - Calorie count  - Protonix PUD ppx    Liver Function Studies   Recent Labs   Lab 06/15/23  0409 06/14/23  1557 06/14/23  0353   PROTTOTAL 5.5* 5.8* 5.3*   ALBUMIN 2.8* 3.0* 2.9*    BILITOTAL 0.8 0.6 0.6   ALKPHOS 62 57 50   AST 93* 122* 152*   ALT 64 74* 75*       Renal, Electrolytes  # Acute Renal Injury  # Hyponatremia  -Baseline CR 0.80    Intake/Output Summary (Last 24 hours) at 6/15/2023 0831  Last data filed at 6/15/2023 0800  Gross per 24 hour   Intake 2535.52 ml   Output 4830 ml   Net -2294.48 ml     Renal Labs  Recent Labs   Lab 06/15/23  0409 23  2205 23  1557 23  0353 23  2044    135* 135* 134*  --    POTASSIUM 3.9 4.0 4.0 4.1 3.8   CHLORIDE 102 104 102 102  --    CO2 19* 17* 23 20*  --    BUN 41.6* 39.0* 37.7* 36.7* 34.9*   CR 1.82* 1.73* 1.55* 1.76* 1.72*   PHOS 3.4  --   --  3.3 2.9   MAG 2.0  --   --  2.2 2.3     Plan:  - Monitor urine output  - Avoid nephrotoxins, renally dose meds  - Diuresis and FBG as above    Infectious Disease  # Aspiration Pneumonia  # Leukocytosis  Recent Labs   Lab 06/15/23  0409 06/14/23  1557 23  0353   WBC 11.8* 13.9* 16.2*     Temp (24hrs), Av.5  F (36.4  C), Min:97  F (36.1  C), Max:98.2  F (36.8  C)    Cultures thus far:    BCx NGTD; UCx NGTD; MRSA (-)    COVID Negative  Antibiotics              Zosyn -present  Plan:  - Continue Zosyn for 5D course  - Monitor for signs of infection    Hematology  # Anemia of critical illness  Admission Hgb 13.4, . MARIA (-)     Hematology Labs  Recent Labs   Lab 06/15/23  04023  1557 23  0353   HGB 8.6* 9.2* 8.8*   HCT 26.9* 28.2* 27.2*   PLT 82* 76* 77*     Plan:  - ASA/ticagrelor for PAULINE  - DVT PPX: heparin infusion    Endocrinology  # Hyperglycemia   # Hgb A1c 5.6  No known medical history    Recent Labs   Lab 06/15/23  0409 06/15/23  0408 06/15/23  0235 06/15/23  0002 23  2205   * 132* 122* 103* 103*     Plan:  -SSI as needed    Integumentary:  - No skin issues    Plan  -early mobility protocol     ICU Cares:  CXR: Daily  Fluids/Feeds: reg diet  DVT Prophylaxis: Heparin infusion  GI Prophylaxis: Protonix  Bowel Regimen:  senna     Lines/Tubes/Drains:  L 8 Fr femoral arterial sheath IABP  R 6 Fr femoral arterial sheath  R 6 Fr femoral venous sheath  R internal jugular CVC  External catheter  Current lines are required for patient management       Family updated by me    Patient seen, discussed, and plan reviewed with MARIAH Suarez CNP  [facility]  ICU Cardiology-CICU Service    Physical exam:  General: In bed, restless, body hurts from activity restrictions  HEENT: PERRL, no scleral icterus or injection  CARDIAC: IRR HR, no m/r/g appreciated. Distal peripheral pulses dopplered  RESP: 4 L NC; CTAB, no wheezes, rhonchi or crackles appreciated.  GI: soft, BS hypoactive. No BM  : Mckenzie  EXTREMITIES: No LE edema, pulses 2+. Femoral access site w/o bleeding, dressing c/d/i. No bruits appreciated.   SKIN: No acute lesions appreciated  NEURO: alert, oriented x 3, occasional confusion    BP (!) 75/45   Pulse 64   Temp 97  F (36.1  C) (Oral)   Resp 28   Wt 102 kg (224 lb 13.9 oz)   SpO2 100%   BMI 33.21 kg/m    Temp:  [97  F (36.1  C)-98.2  F (36.8  C)] 97  F (36.1  C)  Pulse:  [50-83] 64  Resp:  [21-53] 28  BP: ()/(45-64) 75/45  MAP:  [66 mmHg-93 mmHg] 74 mmHg  Arterial Line BP: ()/(41-82) 82/45  SpO2:  [90 %-100 %] 100 %    Labs:  Recent Labs   Lab 06/15/23  0409 06/14/23  2205 06/14/23  1557 06/14/23  0353 06/13/23  2044    135* 135* 134*  --    POTASSIUM 3.9 4.0 4.0 4.1 3.8   CHLORIDE 102 104 102 102  --    CO2 19* 17* 23 20*  --    ANIONGAP 15 14 10 12  --    BUN 41.6* 39.0* 37.7* 36.7* 34.9*   CR 1.82* 1.73* 1.55* 1.76* 1.72*   GFRESTIMATED 37* 40* 45* 39* 40*   SUSAN 8.6* 8.3* 8.6* 8.3*  --    MAG 2.0  --   --  2.2 2.3   PHOS 3.4  --   --  3.3 2.9     Recent Labs   Lab 06/15/23  0409 06/14/23  1557 06/14/23  0353   PROTTOTAL 5.5* 5.8* 5.3*   ALBUMIN 2.8* 3.0* 2.9*   BILITOTAL 0.8 0.6 0.6   ALKPHOS 62 57 50   AST 93* 122* 152*   ALT 64 74* 75*        Recent Labs   Lab  06/15/23  0409 06/14/23  1557 06/14/23  0353   WBC 11.8* 13.9* 16.2*   RBC 3.06* 3.24* 3.13*   HGB 8.6* 9.2* 8.8*   HCT 26.9* 28.2* 27.2*   MCV 88 87 87   MCH 28.1 28.4 28.1   MCHC 32.0 32.6 32.4   RDW 14.9 15.0 15.1*   PLT 82* 76* 77*     Recent Labs   Lab 06/11/23  1311 06/11/23  0837   INR 1.18* 1.07     No lab results found in last 7 days.  Arterial Blood Gas   Recent Labs   Lab 06/15/23  0409 06/15/23  0002 06/14/23 2003 06/14/23 2001 06/12/23  0431 06/12/23  0427 06/12/23  0156 06/12/23  0052   PH  --   --   --  7.45  --  7.40  --  7.36   PCO2  --   --   --  33*  --  29*  --  28*   PO2  --   --   --  79*  --  92  --  106*   HCO3  --   --   --  23  --  18*  --  16*   O2PER 36 36 36 36   < > 37   < > 37    < > = values in this interval not displayed.         Imaging/procedure results:  XR Chest Port 1 View  Narrative: EXAM:  XR CHEST PORT 1 VIEW  6/15/2023 6:15 AM     HISTORY:  daily IABP check       COMPARISON:  Radiograph 6/14/2023, 6/13/2023.    TECHNIQUE: AP supine view of the chest    FINDINGS:   Devices, lines, tubes: Right internal jugular central venous catheter  tip projecting over the low SVC. Intra-aortic balloon pump marker  projecting at the level of the anisa over the proximal descending  thoracic aorta.    The trachea is midline. The cardiomediastinal silhouette is within  normal limits. Similar perihilar opacities.  No appreciable  pneumothorax, pleural effusion, or focal consolidative opacity. No  acute osseous abnormality.    Impression: IMPRESSION:   1. Stable positioning of intra-aortic balloon pump marker.  2. No substantial change in bilateral perihilar edema and/or  atelectasis.    I have personally reviewed the examination and initial interpretation  and I agree with the findings.    GIGI ANN MD         SYSTEM ID:  [id number]

## 2023-06-15 NOTE — PROGRESS NOTES
Major Shift Events:    Patient  Having repeated episodes of bradycardia and hypotension. MAP variability > 30 mmHg. Levophed increased to 0.05 mcg/kg/min.    Pt calm, cooperative, pleasant entirety of shift. Turned Precedex off in part due to frequent bradycardia, in part due to calm/drowsy affect.    TV Pacemaker not sensing correctly, not capturing correctly.    IABP 1:1, 100%. Pumping appropriately.  Bival anticoagulation.        Plan:     Continue to wean Dopamine.  Plan for pacemaker?  Remove/revisit transvenous pacemaker.    For vital signs and complete assessments, please see documentation flowsheets.

## 2023-06-15 NOTE — CONSULTS
Electrophysiology Consultation Note   EP Attending: .   Reason for consultation: consideration for pacing.   Provider requesting consultation: Mr. CastroCONRADO CICU Service.  Date of Service: 6/15/2023      HPI:   Mr. Glover is a 77 year old male who has a medical history significant for HTN, HLD, remote history of DM2, thrombocytopenia, appendicitis, sleep disorder, anxiety, and obesity  , 6/11/2023 he presented to [facility] ER after near syncopal event.  Patient was with his family and reportedly fell and almost passed out.  EMS was called and he was reported to be lethargic with significant hypotension and bradycardia.  HR 30s on arrival and SBP 58 mmHg.  ECG showed inferior STEMI.  He was started on dopamine and taken for emergent coronary angiogram.  This showed occlusion of dominant RCA.  PCI was attempted but complicated by dissection and no reflow.  Also noted to have 9 culprit 80% D1 lesion.  Temporary pacemaker was placed due to bradycardia.  He was then transferred to [facility] for further management.  Echo showed LVEF 60 to 65%, moderately reduced RV function.  There was some concern for AF and patient was placed on digoxin.  Dopamine is being weaned down and dobutamine was added for inotropy, temporary pacemaker wire was discontinued per primary team.  On telemetry having some bradycardia to 40s intermittently.  SCR 1.68, GFR 41, , K 3.2.   Past Medical History:   No past medical history on file.  Past Surgical History:   Past Surgical History:   Procedure Laterality Date    CV CORONARY ANGIOGRAM N/A 6/11/2023    Procedure: Coronary Angiogram;  Surgeon: Filemon Leavitt MD;  Location: [facility] CATH LAB CV    CV CORONARY ANGIOGRAM N/A 6/11/2023    Procedure: Coronary Angiogram;  Surgeon: Derek Mullen MD;  Location:  HEART CARDIAC CATH LAB    CV INTRA AORTIC BALLOON N/A 6/11/2023    Procedure: Intraprocedure Aortic Balloon Pump Insertion;  Surgeon: Filemon Leavitt MD;   Location: [facility] CV    CV PCI ASPIRATION THROMECTOMY N/A 6/11/2023    Procedure: Percutaneous Coronary Intervention Aspiration Thrombectomy;  Surgeon: Filemon Leavitt MD;  Location: [facility] CV    CV PCI STENT DRUG ELUTING N/A 6/11/2023    Procedure: Percutaneous Coronary Intervention Stent;  Surgeon: Filemon Leavitt MD;  Location: [facility] CV    CV TEMPORARY PACEMAKER INSERTION N/A 6/11/2023    Procedure: Temporary Pacemaker Insertion;  Surgeon: Filemon Leavitt MD;  Location: [facility] CV    LAPAROSCOPIC APPENDECTOMY N/A 9/2/2022    Procedure: APPENDECTOMY, LAPAROSCOPIC;  Surgeon: Robi Carmona MD;  Location: [facility] OR     Allergies: Per MAR   No Active Allergies  Medications:   Per MAR current outpatient cardiovascular medications include:   Medications Prior to Admission   Medication Sig Dispense Refill Last Dose    acetaminophen (TYLENOL) 500 MG tablet Take 2 tablets (1,000 mg) by mouth every 6 hours as needed for mild pain   Unknown    aspirin effervescent (HEIDE-SELTZER) 325 MG TBEF tablet Take 1 tablet by mouth 2 times daily as needed   Unknown    benzocaine-menthoL (CEPACOL) 15-3.6 mg [BENZOCAINE-MENTHOL (CEPACOL) 15-3.6 MG] Take 1 lozenge by mouth every hour as needed.  0 Unknown    fish oil-omega-3 fatty acids (FISH OIL) 300-1,000 mg capsule [FISH OIL-OMEGA-3 FATTY ACIDS (FISH OIL) 300-1,000 MG CAPSULE] Take 1 g by mouth daily.   Unknown    losartan-hydrochlorothiazide (HYZAAR) 100-12.5 MG tablet Take 1 tablet by mouth daily       Multiple Vitamins-Minerals (MENS 50+ MULTI VITAMIN/MIN PO) Take 1 tablet by mouth daily   Unknown    pravastatin (PRAVACHOL) 10 MG tablet [PRAVASTATIN (PRAVACHOL) 10 MG TABLET] Take 10 mg by mouth at bedtime.       traZODone (DESYREL) 50 MG tablet [TRAZODONE (DESYREL) 50 MG TABLET] Take 50 mg by mouth at bedtime.        No current outpatient medications on file.     Current Facility-Administered Medications   Medication  Dose Route Frequency    aspirin  81 mg Oral Daily    atorvastatin  80 mg Oral QPM    [Held by provider] digoxin  62.5 mcg Oral Daily    furosemide  80 mg Intravenous Q6H    insulin aspart  1-7 Units Subcutaneous TID AC    insulin aspart  1-5 Units Subcutaneous At Bedtime    melatonin  10 mg Oral At Bedtime    pantoprazole  40 mg Oral QAM AC    piperacillin-tazobactam  3.375 g Intravenous Q6H    QUEtiapine  25 mg Oral BID    And    QUEtiapine  50 mg Oral At Bedtime    senna-docusate  1 tablet Oral BID    Or    senna-docusate  2 tablet Oral BID    sodium chloride (PF)  3 mL Intracatheter Q8H    ticagrelor  90 mg Oral BID     Family History:   No family history on file.  Social History:   Social History     Tobacco Use    Smoking status: Never    Smokeless tobacco: Never   Vaping Use    Vaping status: Not on file   Substance Use Topics    Alcohol use: Yes     Alcohol/week: 15.0 standard drinks of alcohol       ROS:   A comprehensive 10 point ROS was negative other than as mentioned in HPI.    Physical Examination:   VITALS: BP (!) 75/45   Pulse 60   Temp 98.5  F (36.9  C) (Axillary)   Resp 25   Wt 102 kg (224 lb 13.9 oz)   SpO2 100%   BMI 33.21 kg/m    GENERAL APPEARANCE: Alert, restless  HEENT: NCAT, EOMI, MMM. PERRLA.  NECK: Supple.  CHEST: CTAB   CARDIOVASCULAR: Slightly irregular.    ABDOMEN: BS+, soft, NT.   EXTREMITIES: No pedal edema. Distal pulses intact.   NEURO: Grossly nonfocal.   PSYCH: Normal affect.  SKIN: Warm and dry.   Data:   Labs:  [facility]  Recent Labs   Lab 06/15/23  0829 06/15/23  0409 06/15/23  0408 06/15/23  0235 06/15/23  0002 06/14/23  2205 06/14/23  1601 06/14/23  1557 06/14/23  0559 06/14/23  0353   NA  --  136  --   --   --  135*  --  135*  --  134*   POTASSIUM  --  3.9  --   --   --  4.0  --  4.0  --  4.1   CHLORIDE  --  102  --   --   --  104  --  102  --  102   SUSAN  --  8.6*  --   --   --  8.3*  --  8.6*  --  8.3*   CO2  --  19*  --   --   --  17*  --  23  --  20*   BUN  --  41.6*  --    --   --  39.0*  --  37.7*  --  36.7*   CR  --  1.82*  --   --   --  1.73*  --  1.55*  --  1.76*   * 129* 132* 122*   < > 103*   < > 116*   < > 116*  120*    < > = values in this interval not displayed.     CBC  Recent Labs   Lab 06/15/23  0409 23  1557 23  0353 23  1526   WBC 11.8* 13.9* 16.2* 18.3*   RBC 3.06* 3.24* 3.13* 3.42*   HGB 8.6* 9.2* 8.8* 9.7*   HCT 26.9* 28.2* 27.2* 29.5*   MCV 88 87 87 86   MCH 28.1 28.4 28.1 28.4   MCHC 32.0 32.6 32.4 32.9   RDW 14.9 15.0 15.1* 15.4*   PLT 82* 76* 77* 76*     INR  Recent Labs   Lab 23  1311 23  0837   INR 1.18* 1.07     EK/11/23 Coronary Angiogram:  Conclusion    Acute inferolateral STEMI with cardiogenic shock, RV infarct, due to totally occluded right coronary artery.  80% lesion in first diagonal but no significant disease in LAD or left circumflex main trunk vessels.  Complex intervention to right coronary artery with multiple balloon balloon inflations, intracoronary thrombectomy and drug-eluting stent implant x2  Patient treated with intravenous dopamine, epinephrine, with placement of intra-aortic balloon pump, temporary pacemaker wire.    ECHO:   Interpretation Summary  Global and regional left ventricular function is normal with an EF of 60-65%.  Global right ventricular function is moderately reduced.  The inferior vena cava is normal.  No pericardial effusion is present.  Assessment:   Mr. Glover is a 77 year old male who has a medical history significant for HTN, HLD, remote history of DM2, thrombocytopenia, appendicitis, sleep disorder, anxiety, and obesity  , 2023 he presented to [facility] ER after near syncopal event.  Patient was with his family and reportedly fell and almost passed out.  EMS was called and he was reported to be lethargic with significant hypotension and bradycardia.  HR 30s on arrival and SBP 58 mmHg.  ECG showed inferior STEMI.  He was started on dopamine and taken for emergent  coronary angiogram.  This showed occlusion of dominant RCA.  PCI was attempted but complicated by dissection and no reflow.  Also noted to have 9 culprit 80% D1 lesion.  Temporary pacemaker was placed due to bradycardia.  He was then transferred to [facility] for further management.  Echo showed LVEF 60 to 65%, moderately reduced RV function.  There was some concern for AF and patient was placed on digoxin.  Dopamine is being weaned down and dobutamine was added for inotropy, temporary pacemaker wire was discontinued per primary team.  On telemetry having some bradycardia to 40s intermittently.  SCR 1.68, GFR 41, , K 3.2.  EP Recommendations:  EP consulted for bradycardia in the setting of RCA infarct.  Now with some RV dysfunction and ongoing ST elevations..  Pacemaker wire was removed.  Some concern for AF and patient was on digoxin.  Agree with stopping digoxin.  ECGs review and no clear evidence of AF at this point.  Appears all to be sinus with PACs, sometimes with competing junctional rhythm.  QRS is narrow.  No current indication for PPM at this time, but will need to follow telemetry and see how patient will tolerate medical therapy etc.     Thank you for allowing us to participate in the care of this patient.     The patient was discussed w/ Dr. Shearer.  The above note reflects our joint plan.    MARIAH Rivera CNP  Electrophysiology Consult Service  Pager: [phone]    PRASHANT Total time spent on patient visit, reviewing notes, imaging, labs, orders, and completing necessary documentation: 30 minutes.  >50% of visit spent on counseling patient and/or coordination of care.    EP STAFF NOTE  I have seen and examined the patient as part of a shared visit with FORTINO Rivera NP.  I agree with the note above. I reviewed today's vital signs and medications. I have reviewed and discussed with the advanced practice provider their physical examination, assessment, and plan   Briefly, no clear afib on recordings,  rather sinus with PACs  My key history/exam findings are: RRR.   The key management decisions made by me: no need for PPM at this time.  Total time spent on patient visit, reviewing notes, imaging, labs, orders, and completing necessary documentation: 30 minutes.  >50% of visit spent on counseling patient and/or coordination of care.     Kwadwo Shearer MD [facility]  Cardiology - Electrophysiology

## 2023-06-15 NOTE — PROGRESS NOTES
Major Shift Events:  Transvenous pacer pulled d/t not working properly, A-Line pulled,    Neuro: A/Ox4- can be forgetful of hospital name but easily reminded, BRADLEY, following commands, pupils = reactive, [place]  CV: Afib/bradycardic at times,Maps maintained with levo- using IABP for blood pressures, Afebrile  Respi: LS dim, on RA saturating well- encouraged to use CPAP during long sleeping periods  Gi/Gu: Having BMs, decreased appetite, voiding via james with good UOP    Plan: Work on healthy sleep/wake cycles, wean IV meds as able    For vital signs and complete assessments, please see documentation flowsheets.

## 2023-06-16 ENCOUNTER — APPOINTMENT (OUTPATIENT)
Dept: GENERAL RADIOLOGY | Facility: CLINIC | Age: 78
DRG: 270 | End: 2023-06-16
Attending: INTERNAL MEDICINE
Payer: COMMERCIAL

## 2023-06-16 LAB
ALBUMIN SERPL BCG-MCNC: 2.7 G/DL (ref 3.5–5.2)
ALBUMIN SERPL BCG-MCNC: 3.1 G/DL (ref 3.5–5.2)
ALP SERPL-CCNC: 62 U/L (ref 40–129)
ALP SERPL-CCNC: 68 U/L (ref 40–129)
ALT SERPL W P-5'-P-CCNC: 54 U/L (ref 0–70)
ALT SERPL W P-5'-P-CCNC: 56 U/L (ref 0–70)
ANION GAP SERPL CALCULATED.3IONS-SCNC: 10 MMOL/L (ref 7–15)
ANION GAP SERPL CALCULATED.3IONS-SCNC: 14 MMOL/L (ref 7–15)
ANION GAP SERPL CALCULATED.3IONS-SCNC: 15 MMOL/L (ref 7–15)
APTT PPP: 108 SECONDS (ref 22–38)
AST SERPL W P-5'-P-CCNC: 60 U/L (ref 0–45)
AST SERPL W P-5'-P-CCNC: 69 U/L (ref 0–45)
ATRIAL RATE - MUSE: NORMAL BPM
BASE EXCESS BLDV CALC-SCNC: -0.3 MMOL/L (ref -7.7–1.9)
BASE EXCESS BLDV CALC-SCNC: -0.6 MMOL/L (ref -7.7–1.9)
BASE EXCESS BLDV CALC-SCNC: -0.9 MMOL/L (ref -7.7–1.9)
BASE EXCESS BLDV CALC-SCNC: -1.2 MMOL/L (ref -7.7–1.9)
BASE EXCESS BLDV CALC-SCNC: -1.4 MMOL/L (ref -7.7–1.9)
BASE EXCESS BLDV CALC-SCNC: 0 MMOL/L (ref -7.7–1.9)
BASE EXCESS BLDV CALC-SCNC: 0 MMOL/L (ref -7.7–1.9)
BILIRUB SERPL-MCNC: 0.7 MG/DL
BILIRUB SERPL-MCNC: 0.8 MG/DL
BUN SERPL-MCNC: 36.1 MG/DL (ref 8–23)
BUN SERPL-MCNC: 40.3 MG/DL (ref 8–23)
BUN SERPL-MCNC: 40.7 MG/DL (ref 8–23)
CALCIUM SERPL-MCNC: 8.2 MG/DL (ref 8.8–10.2)
CALCIUM SERPL-MCNC: 8.3 MG/DL (ref 8.8–10.2)
CALCIUM SERPL-MCNC: 8.6 MG/DL (ref 8.8–10.2)
CHLORIDE SERPL-SCNC: 100 MMOL/L (ref 98–107)
CHLORIDE SERPL-SCNC: 103 MMOL/L (ref 98–107)
CHLORIDE SERPL-SCNC: 105 MMOL/L (ref 98–107)
CREAT SERPL-MCNC: 1.49 MG/DL (ref 0.67–1.17)
CREAT SERPL-MCNC: 1.63 MG/DL (ref 0.67–1.17)
CREAT SERPL-MCNC: 1.68 MG/DL (ref 0.67–1.17)
DEPRECATED HCO3 PLAS-SCNC: 16 MMOL/L (ref 22–29)
DEPRECATED HCO3 PLAS-SCNC: 19 MMOL/L (ref 22–29)
DEPRECATED HCO3 PLAS-SCNC: 19 MMOL/L (ref 22–29)
DIASTOLIC BLOOD PRESSURE - MUSE: NORMAL MMHG
ERYTHROCYTE [DISTWIDTH] IN BLOOD BY AUTOMATED COUNT: 14.9 % (ref 10–15)
ERYTHROCYTE [DISTWIDTH] IN BLOOD BY AUTOMATED COUNT: 15 % (ref 10–15)
GFR SERPL CREATININE-BSD FRML MDRD: 41 ML/MIN/1.73M2
GFR SERPL CREATININE-BSD FRML MDRD: 43 ML/MIN/1.73M2
GFR SERPL CREATININE-BSD FRML MDRD: 47 ML/MIN/1.73M2
GLUCOSE BLDC GLUCOMTR-MCNC: 157 MG/DL (ref 70–99)
GLUCOSE BLDC GLUCOMTR-MCNC: 162 MG/DL (ref 70–99)
GLUCOSE BLDC GLUCOMTR-MCNC: 172 MG/DL (ref 70–99)
GLUCOSE BLDC GLUCOMTR-MCNC: 175 MG/DL (ref 70–99)
GLUCOSE SERPL-MCNC: 170 MG/DL (ref 70–99)
GLUCOSE SERPL-MCNC: 179 MG/DL (ref 70–99)
GLUCOSE SERPL-MCNC: 214 MG/DL (ref 70–99)
HCO3 BLDV-SCNC: 22 MMOL/L (ref 21–28)
HCO3 BLDV-SCNC: 22 MMOL/L (ref 21–28)
HCO3 BLDV-SCNC: 23 MMOL/L (ref 21–28)
HCT VFR BLD AUTO: 23.1 % (ref 40–53)
HCT VFR BLD AUTO: 24.9 % (ref 40–53)
HGB BLD-MCNC: 7.5 G/DL (ref 13.3–17.7)
HGB BLD-MCNC: 8 G/DL (ref 13.3–17.7)
INTERPRETATION ECG - MUSE: NORMAL
LACTATE SERPL-SCNC: 0.9 MMOL/L (ref 0.7–2)
LACTATE SERPL-SCNC: 1 MMOL/L (ref 0.7–2)
LACTATE SERPL-SCNC: 1.3 MMOL/L (ref 0.7–2)
LACTATE SERPL-SCNC: 1.3 MMOL/L (ref 0.7–2)
LACTATE SERPL-SCNC: 1.8 MMOL/L (ref 0.7–2)
MAGNESIUM SERPL-MCNC: 2.1 MG/DL (ref 1.7–2.3)
MAGNESIUM SERPL-MCNC: 2.1 MG/DL (ref 1.7–2.3)
MCH RBC QN AUTO: 27.9 PG (ref 26.5–33)
MCH RBC QN AUTO: 28 PG (ref 26.5–33)
MCHC RBC AUTO-ENTMCNC: 32.1 G/DL (ref 31.5–36.5)
MCHC RBC AUTO-ENTMCNC: 32.5 G/DL (ref 31.5–36.5)
MCV RBC AUTO: 86 FL (ref 78–100)
MCV RBC AUTO: 87 FL (ref 78–100)
O2/TOTAL GAS SETTING VFR VENT: 21 %
OXYHGB MFR BLDV: 37 % (ref 70–75)
OXYHGB MFR BLDV: 41 % (ref 70–75)
OXYHGB MFR BLDV: 51 % (ref 70–75)
OXYHGB MFR BLDV: 52 % (ref 70–75)
OXYHGB MFR BLDV: 52 % (ref 70–75)
OXYHGB MFR BLDV: 58 % (ref 70–75)
OXYHGB MFR BLDV: 65 % (ref 70–75)
P AXIS - MUSE: NORMAL DEGREES
PCO2 BLDV: 28 MM HG (ref 40–50)
PCO2 BLDV: 29 MM HG (ref 40–50)
PCO2 BLDV: 31 MM HG (ref 40–50)
PCO2 BLDV: 32 MM HG (ref 40–50)
PCO2 BLDV: 32 MM HG (ref 40–50)
PH BLDV: 7.46 [PH] (ref 7.32–7.43)
PH BLDV: 7.47 [PH] (ref 7.32–7.43)
PH BLDV: 7.47 [PH] (ref 7.32–7.43)
PH BLDV: 7.49 [PH] (ref 7.32–7.43)
PH BLDV: 7.5 [PH] (ref 7.32–7.43)
PH BLDV: 7.51 [PH] (ref 7.32–7.43)
PH BLDV: 7.52 [PH] (ref 7.32–7.43)
PHOSPHATE SERPL-MCNC: 2 MG/DL (ref 2.5–4.5)
PLATELET # BLD AUTO: 68 10E3/UL (ref 150–450)
PLATELET # BLD AUTO: 73 10E3/UL (ref 150–450)
PO2 BLDV: 24 MM HG (ref 25–47)
PO2 BLDV: 26 MM HG (ref 25–47)
PO2 BLDV: 30 MM HG (ref 25–47)
PO2 BLDV: 31 MM HG (ref 25–47)
PO2 BLDV: 35 MM HG (ref 25–47)
POTASSIUM SERPL-SCNC: 3.2 MMOL/L (ref 3.4–5.3)
POTASSIUM SERPL-SCNC: 3.6 MMOL/L (ref 3.4–5.3)
PR INTERVAL - MUSE: NORMAL MS
PROT SERPL-MCNC: 5.4 G/DL (ref 6.4–8.3)
PROT SERPL-MCNC: 5.8 G/DL (ref 6.4–8.3)
QRS DURATION - MUSE: 76 MS
QT - MUSE: 420 MS
QTC - MUSE: 412 MS
R AXIS - MUSE: 3 DEGREES
RBC # BLD AUTO: 2.69 10E6/UL (ref 4.4–5.9)
RBC # BLD AUTO: 2.86 10E6/UL (ref 4.4–5.9)
SODIUM SERPL-SCNC: 130 MMOL/L (ref 136–145)
SODIUM SERPL-SCNC: 132 MMOL/L (ref 136–145)
SODIUM SERPL-SCNC: 139 MMOL/L (ref 136–145)
SYSTOLIC BLOOD PRESSURE - MUSE: NORMAL MMHG
T AXIS - MUSE: 70 DEGREES
UFH PPP CHRO-ACNC: 0.44 IU/ML
UFH PPP CHRO-ACNC: 0.48 IU/ML
VENTRICULAR RATE- MUSE: 58 BPM
WBC # BLD AUTO: 6.6 10E3/UL (ref 4–11)
WBC # BLD AUTO: 7.3 10E3/UL (ref 4–11)

## 2023-06-16 PROCEDURE — 84132 ASSAY OF SERUM POTASSIUM: CPT

## 2023-06-16 PROCEDURE — 250N000011 HC RX IP 250 OP 636: Performed by: HOSPITALIST

## 2023-06-16 PROCEDURE — 83605 ASSAY OF LACTIC ACID: CPT | Performed by: STUDENT IN AN ORGANIZED HEALTH CARE EDUCATION/TRAINING PROGRAM

## 2023-06-16 PROCEDURE — 85027 COMPLETE CBC AUTOMATED: CPT

## 2023-06-16 PROCEDURE — 80053 COMPREHEN METABOLIC PANEL: CPT

## 2023-06-16 PROCEDURE — 84450 TRANSFERASE (AST) (SGOT): CPT

## 2023-06-16 PROCEDURE — 200N000002 HC R&B ICU UMMC

## 2023-06-16 PROCEDURE — 83735 ASSAY OF MAGNESIUM: CPT | Performed by: INTERNAL MEDICINE

## 2023-06-16 PROCEDURE — 99291 CRITICAL CARE FIRST HOUR: CPT | Mod: FS | Performed by: STUDENT IN AN ORGANIZED HEALTH CARE EDUCATION/TRAINING PROGRAM

## 2023-06-16 PROCEDURE — 71045 X-RAY EXAM CHEST 1 VIEW: CPT | Mod: 26 | Performed by: RADIOLOGY

## 2023-06-16 PROCEDURE — 82805 BLOOD GASES W/O2 SATURATION: CPT | Performed by: STUDENT IN AN ORGANIZED HEALTH CARE EDUCATION/TRAINING PROGRAM

## 2023-06-16 PROCEDURE — 999N000155 HC STATISTIC RAPCV CVP MONITORING

## 2023-06-16 PROCEDURE — 84100 ASSAY OF PHOSPHORUS: CPT | Performed by: INTERNAL MEDICINE

## 2023-06-16 PROCEDURE — 250N000011 HC RX IP 250 OP 636: Performed by: STUDENT IN AN ORGANIZED HEALTH CARE EDUCATION/TRAINING PROGRAM

## 2023-06-16 PROCEDURE — 250N000013 HC RX MED GY IP 250 OP 250 PS 637: Performed by: STUDENT IN AN ORGANIZED HEALTH CARE EDUCATION/TRAINING PROGRAM

## 2023-06-16 PROCEDURE — 82805 BLOOD GASES W/O2 SATURATION: CPT | Performed by: INTERNAL MEDICINE

## 2023-06-16 PROCEDURE — 999N000075 HC STATISTIC IABP MONITORING

## 2023-06-16 PROCEDURE — 93010 ELECTROCARDIOGRAM REPORT: CPT | Performed by: INTERNAL MEDICINE

## 2023-06-16 PROCEDURE — 84132 ASSAY OF SERUM POTASSIUM: CPT | Performed by: INTERNAL MEDICINE

## 2023-06-16 PROCEDURE — 250N000013 HC RX MED GY IP 250 OP 250 PS 637: Performed by: INTERNAL MEDICINE

## 2023-06-16 PROCEDURE — 85520 HEPARIN ASSAY: CPT | Performed by: INTERNAL MEDICINE

## 2023-06-16 PROCEDURE — 250N000013 HC RX MED GY IP 250 OP 250 PS 637: Performed by: NURSE PRACTITIONER

## 2023-06-16 PROCEDURE — 85730 THROMBOPLASTIN TIME PARTIAL: CPT

## 2023-06-16 PROCEDURE — 250N000011 HC RX IP 250 OP 636: Performed by: INTERNAL MEDICINE

## 2023-06-16 PROCEDURE — 83605 ASSAY OF LACTIC ACID: CPT | Performed by: INTERNAL MEDICINE

## 2023-06-16 PROCEDURE — 250N000011 HC RX IP 250 OP 636: Performed by: NURSE PRACTITIONER

## 2023-06-16 PROCEDURE — 250N000013 HC RX MED GY IP 250 OP 250 PS 637: Performed by: HOSPITALIST

## 2023-06-16 PROCEDURE — 33968 REMOVE AORTIC ASSIST DEVICE: CPT

## 2023-06-16 PROCEDURE — 71045 X-RAY EXAM CHEST 1 VIEW: CPT

## 2023-06-16 RX ORDER — NALOXONE HYDROCHLORIDE 0.4 MG/ML
0.2 INJECTION, SOLUTION INTRAMUSCULAR; INTRAVENOUS; SUBCUTANEOUS
Status: DISCONTINUED | OUTPATIENT
Start: 2023-06-16 | End: 2023-06-21 | Stop reason: HOSPADM

## 2023-06-16 RX ORDER — PIPERACILLIN SODIUM, TAZOBACTAM SODIUM 3; .375 G/15ML; G/15ML
3.38 INJECTION, POWDER, LYOPHILIZED, FOR SOLUTION INTRAVENOUS EVERY 6 HOURS
Status: COMPLETED | OUTPATIENT
Start: 2023-06-16 | End: 2023-06-18

## 2023-06-16 RX ORDER — LOPERAMIDE HCL 2 MG
2 CAPSULE ORAL 4 TIMES DAILY PRN
Status: DISCONTINUED | OUTPATIENT
Start: 2023-06-16 | End: 2023-06-19

## 2023-06-16 RX ORDER — NALOXONE HYDROCHLORIDE 0.4 MG/ML
0.4 INJECTION, SOLUTION INTRAMUSCULAR; INTRAVENOUS; SUBCUTANEOUS
Status: DISCONTINUED | OUTPATIENT
Start: 2023-06-16 | End: 2023-06-21 | Stop reason: HOSPADM

## 2023-06-16 RX ORDER — FUROSEMIDE 10 MG/ML
80 INJECTION INTRAMUSCULAR; INTRAVENOUS EVERY 6 HOURS
Status: COMPLETED | OUTPATIENT
Start: 2023-06-16 | End: 2023-06-16

## 2023-06-16 RX ORDER — FENTANYL CITRATE 50 UG/ML
50 INJECTION, SOLUTION INTRAMUSCULAR; INTRAVENOUS ONCE
Status: COMPLETED | OUTPATIENT
Start: 2023-06-16 | End: 2023-06-16

## 2023-06-16 RX ORDER — OXYCODONE HYDROCHLORIDE 5 MG/1
5 TABLET ORAL ONCE
Status: COMPLETED | OUTPATIENT
Start: 2023-06-16 | End: 2023-06-16

## 2023-06-16 RX ORDER — POTASSIUM CHLORIDE 29.8 MG/ML
20 INJECTION INTRAVENOUS ONCE
Status: COMPLETED | OUTPATIENT
Start: 2023-06-16 | End: 2023-06-16

## 2023-06-16 RX ORDER — POTASSIUM CHLORIDE 1.5 G/1.58G
40 POWDER, FOR SOLUTION ORAL 2 TIMES DAILY
Status: COMPLETED | OUTPATIENT
Start: 2023-06-16 | End: 2023-06-16

## 2023-06-16 RX ORDER — ATROPINE SULFATE 0.1 MG/ML
INJECTION INTRAVENOUS
Status: DISCONTINUED
Start: 2023-06-16 | End: 2023-06-16 | Stop reason: HOSPADM

## 2023-06-16 RX ORDER — QUETIAPINE FUMARATE 50 MG/1
50 TABLET, FILM COATED ORAL ONCE
Status: COMPLETED | OUTPATIENT
Start: 2023-06-16 | End: 2023-06-16

## 2023-06-16 RX ORDER — POTASSIUM CHLORIDE 29.8 MG/ML
20 INJECTION INTRAVENOUS
Status: COMPLETED | OUTPATIENT
Start: 2023-06-16 | End: 2023-06-16

## 2023-06-16 RX ORDER — QUETIAPINE FUMARATE 25 MG/1
25 TABLET, FILM COATED ORAL 2 TIMES DAILY PRN
Status: DISCONTINUED | OUTPATIENT
Start: 2023-06-16 | End: 2023-06-20

## 2023-06-16 RX ADMIN — ACETAMINOPHEN 650 MG: 325 TABLET, FILM COATED ORAL at 08:07

## 2023-06-16 RX ADMIN — POTASSIUM & SODIUM PHOSPHATES POWDER PACK 280-160-250 MG 1 PACKET: 280-160-250 PACK at 14:07

## 2023-06-16 RX ADMIN — TICAGRELOR 90 MG: 90 TABLET ORAL at 21:13

## 2023-06-16 RX ADMIN — ASPIRIN 81 MG: 81 TABLET ORAL at 08:07

## 2023-06-16 RX ADMIN — OXYCODONE HYDROCHLORIDE 5 MG: 5 TABLET ORAL at 01:45

## 2023-06-16 RX ADMIN — QUETIAPINE FUMARATE 50 MG: 50 TABLET ORAL at 01:46

## 2023-06-16 RX ADMIN — POTASSIUM CHLORIDE 40 MEQ: 1.5 POWDER, FOR SOLUTION ORAL at 21:13

## 2023-06-16 RX ADMIN — HEPARIN SODIUM 1350 UNITS/HR: 10000 INJECTION, SOLUTION INTRAVENOUS at 02:38

## 2023-06-16 RX ADMIN — PIPERACILLIN AND TAZOBACTAM 3.38 G: 3; .375 INJECTION, POWDER, LYOPHILIZED, FOR SOLUTION INTRAVENOUS at 14:07

## 2023-06-16 RX ADMIN — POTASSIUM & SODIUM PHOSPHATES POWDER PACK 280-160-250 MG 1 PACKET: 280-160-250 PACK at 10:07

## 2023-06-16 RX ADMIN — FUROSEMIDE 80 MG: 10 INJECTION, SOLUTION INTRAVENOUS at 21:13

## 2023-06-16 RX ADMIN — POTASSIUM CHLORIDE 20 MEQ: 29.8 INJECTION, SOLUTION INTRAVENOUS at 18:40

## 2023-06-16 RX ADMIN — TICAGRELOR 90 MG: 90 TABLET ORAL at 08:08

## 2023-06-16 RX ADMIN — POTASSIUM CHLORIDE 20 MEQ: 29.8 INJECTION, SOLUTION INTRAVENOUS at 05:57

## 2023-06-16 RX ADMIN — POTASSIUM CHLORIDE 40 MEQ: 1.5 POWDER, FOR SOLUTION ORAL at 08:41

## 2023-06-16 RX ADMIN — PANTOPRAZOLE SODIUM 40 MG: 40 TABLET, DELAYED RELEASE ORAL at 08:08

## 2023-06-16 RX ADMIN — ACETAMINOPHEN 650 MG: 325 TABLET, FILM COATED ORAL at 21:11

## 2023-06-16 RX ADMIN — POTASSIUM & SODIUM PHOSPHATES POWDER PACK 280-160-250 MG 1 PACKET: 280-160-250 PACK at 05:57

## 2023-06-16 RX ADMIN — ATORVASTATIN CALCIUM 80 MG: 80 TABLET, FILM COATED ORAL at 21:12

## 2023-06-16 RX ADMIN — DOBUTAMINE HYDROCHLORIDE 2.5 MCG/KG/MIN: 200 INJECTION INTRAVENOUS at 17:02

## 2023-06-16 RX ADMIN — Medication 10 MG: at 21:12

## 2023-06-16 RX ADMIN — FENTANYL CITRATE 50 MCG: 50 INJECTION, SOLUTION INTRAMUSCULAR; INTRAVENOUS at 11:10

## 2023-06-16 RX ADMIN — PIPERACILLIN AND TAZOBACTAM 3.38 G: 3; .375 INJECTION, POWDER, LYOPHILIZED, FOR SOLUTION INTRAVENOUS at 08:41

## 2023-06-16 RX ADMIN — PIPERACILLIN AND TAZOBACTAM 3.38 G: 3; .375 INJECTION, POWDER, LYOPHILIZED, FOR SOLUTION INTRAVENOUS at 02:54

## 2023-06-16 RX ADMIN — QUETIAPINE FUMARATE 25 MG: 25 TABLET ORAL at 08:08

## 2023-06-16 RX ADMIN — FUROSEMIDE 80 MG: 10 INJECTION, SOLUTION INTRAVENOUS at 08:40

## 2023-06-16 RX ADMIN — PIPERACILLIN AND TAZOBACTAM 3.38 G: 3; .375 INJECTION, POWDER, LYOPHILIZED, FOR SOLUTION INTRAVENOUS at 21:14

## 2023-06-16 RX ADMIN — POTASSIUM CHLORIDE 20 MEQ: 29.8 INJECTION, SOLUTION INTRAVENOUS at 00:27

## 2023-06-16 RX ADMIN — POTASSIUM CHLORIDE 20 MEQ: 29.8 INJECTION, SOLUTION INTRAVENOUS at 01:42

## 2023-06-16 RX ADMIN — LOPERAMIDE HYDROCHLORIDE 2 MG: 2 CAPSULE ORAL at 10:07

## 2023-06-16 RX ADMIN — FUROSEMIDE 80 MG: 10 INJECTION, SOLUTION INTRAVENOUS at 14:07

## 2023-06-16 RX ADMIN — QUETIAPINE FUMARATE 50 MG: 50 TABLET ORAL at 21:13

## 2023-06-16 ASSESSMENT — ACTIVITIES OF DAILY LIVING (ADL)
ADLS_ACUITY_SCORE: 32
ADLS_ACUITY_SCORE: 30
ADLS_ACUITY_SCORE: 30
ADLS_ACUITY_SCORE: 32
ADLS_ACUITY_SCORE: 30
ADLS_ACUITY_SCORE: 32
ADLS_ACUITY_SCORE: 32
ADLS_ACUITY_SCORE: 30

## 2023-06-16 NOTE — PLAN OF CARE
Major Shift Events: Pt alert and oriented. Forgetful at times. Afib with stable BP. Dobutamine per MAR. CVP 8-14. IABP out at 1130. On RA with O2 sats >95%. Mckenzie with adequate UOP. Lasix given per MAR. 2 small BM's today. Immodium given. Pt encouraged to order food.     Plan: Monitor hemodynamics and diuresis. Possible floor orders tomorrow.     For vital signs and complete assessments, please see documentation flowsheets.

## 2023-06-16 NOTE — PROCEDURES
Cardiac ICU Procedure Note    Procedure: Intra-aortic balloon pump removal    Patient's Name: Kimani Glover  Service performing procedure: Cardiac ICU  Attending Physician: Dr. Bray  Indication for procedure: No longer needed    Acuity: Elective  Procedure date: June 16, 2023    A Time Out was performed immediately prior to the procedure to confirm correct patient, procedure, and site (site marked if applicable): Yes     Procedure:  The IABP entry site was undressed and cleaned.  The sutures were removed.  The balloon was stopped and disconnected from the pump.  The balloon and 8Fr sheath were removed briskly, bleeding was allowed momentarily to remove debris, and pressure was held.  Distal pulses were intact by doppler while pressure was held.  There were no signs of hematoma or bleeding after completion of the hold.  The site was again cleaned and dressed.  The patient remained hemodynamically stable throughout the procedure.    The patient tolerated the procedure without any immediate complications.     MARIAH Stout CNP  [facility]  ICU Cardiology-CICU Service  June 16, 2023

## 2023-06-16 NOTE — PROGRESS NOTES
Cardiology ICU Progress Note    Brief HPI:Kimani Glover is a 77 year old male who was admitted on 6/11/2023  following STEMI and PCI. Patient presented to [facility] ED after near syncope. Patient was reportedly with his family when he fell and almost lost consciousness. EMS was called and he was found to be lethargic with SBP 58 mmHg and bradycardic HR 30s. He was brought to [place] ED where he was found to be bradycardic 32 bpm and hypotensive. EKG showed inferior STEMI. He was started on dopamine and taken to the cath lab for emergent angiography.      Coronary angiogram showed occlusion of dominant RCA. PCI was attempted but was complicated by dissection and no-reflow. Temporary pace maker was placed. He was transferred to [facility] CICU for further management.      On arrival patient hemodynamically stable on 12 dopamine and 0.01 epinephrine. Patient comfortable without chest pain, shortness of breath, lightheadedness, dizziness, fevers, chills.     Interval changes:   - Improved delirium  - Pain in back with lying flat  - weaned off dopamine    Today's Plan:  - IABP removal  - Seroquel to nightly and PRN  - Low dose Oxy for pain  - FBG (-) 1-2 L, 80 mg lasix TID  - Continue Zosyn for 7 D course    ASSESSMENT AND PLAN BY SYSTEM:   Neurology   # Acute delirium  # Acute pain  CAM (+), requring medications for management of agitation, improving today. Pain with positioning in bed    Plan:  - Seroquel 50 nightly, 25 BID PRN  - Encourage day night cycles  - Low dose prn oxy for pain management    Cardiovascular  # Inferior STEMI  # RCA dissection with stenting of proximal to mid RCA  #Non-culprit 80% D1 lesion  # No reflow (DEMARCUS 0)  # IABP in place  # Dyslipidemia  # A-fib  TTE 6/12: Global and regional left ventricular function is normal with an EF of 60-65%.  Global right ventricular function is moderately reduced.The inferior vena cava is normal.  No pericardial effusion is present.  Angio 6/11: dRCA  70%  stenosed, RPDA 50% stenosed.The RCA is a dominant artery withTIMI2 flow. It is stented from the ostium to the genu inferius, the stents are patent.   The RCA has a 70% lesion in its distal segment, that does not require emergency         treatment.   ECG Rhythm: Atrial fibrillation    Current Pressors/inotropes/antiarrythmic:    Dose (mcg/kg/min) DOBUTamine: 2.5 mcg/kg/min      Invasive Hemodynamic Monitoring:  CVP (mmHg): 13 mmHg     Plan:  - Remove IABP  - Continue dobutamine at 2.5  - Heparin infusion for a-fib  - FBG (-) 1-2 L, 80 mg lasix  CAD:  - Continue ASA 81mg and ticagrelor 90mg BID for CAD  GDMT:  - Hold lipitor for now given shock liver  - Hold ACE/ARB for now given likely reduced renal fxn after arrest  - Holding beta blocker given shock  - Hold MRA due to likely reduced renal fx after arrest  - Hold SGLT2i due to likely reduced renal fx after arrest    Pulmonary  # Pulmonary edema  # THONG  RA  CXR: IABP, IJ in stable position. Slightly increased edema.     Plan:  - Diuresis as above  - Daily CXR   - Home CPAP at night  - Pulm toileting  - Consider scheduled duonebs if signs of lung dz, currently PRN      Gastrointestinal, Nutrition  #Hypoalbuminemia  -Nutrition: Regular diet  -Last BM: 6/16    Plan:  - Bowel regimen as needed  - Regular diet  - Calorie count  - Protonix PUD ppx    Liver Function Studies   Recent Labs   Lab 06/16/23  0334 06/15/23  1606 06/15/23  0409   PROTTOTAL 5.4* 5.6* 5.5*   ALBUMIN 2.7* 3.0* 2.8*   BILITOTAL 0.7 0.6 0.8   ALKPHOS 62 67 62   AST 69* 80* 93*   ALT 54 59 64       Renal, Electrolytes  # Acute Renal Injury  # Hyponatremia  -Baseline CR 0.80    Intake/Output Summary (Last 24 hours) at 6/16/2023 0816  Last data filed at 6/16/2023 0700  Gross per 24 hour   Intake 2516.27 ml   Output 2371 ml   Net 145.27 ml     Renal Labs  Recent Labs   Lab 06/16/23  0334 06/15/23  2153 06/15/23  1606 06/15/23  0409 06/14/23  1557 06/14/23  0353   * 130* 132* 136   < > 134*    POTASSIUM 3.6  3.6 3.2*  3.2* 3.2* 3.9   < > 4.1   CHLORIDE 103 100 99 102   < > 102   CO2 19* 16* 18* 19*   < > 20*   BUN 40.7* 40.3* 42.1* 41.6*   < > 36.7*   CR 1.63* 1.68* 1.70* 1.82*   < > 1.76*   PHOS 2.0*  --   --  3.4  --  3.3   MAG 2.1 2.1  --  2.0  --  2.2    < > = values in this interval not displayed.     Plan:  - Monitor urine output  - Avoid nephrotoxins, renally dose meds  - Diuresis and FBG as above    Infectious Disease  # Aspiration Pneumonia  # Leukocytosis  Recent Labs   Lab 23  0335 06/15/23  1606 06/15/23  0409   WBC 7.3 11.2* 11.8*     Temp (24hrs), Av.7  F (37.1  C), Min:98.4  F (36.9  C), Max:98.9  F (37.2  C)    Cultures thus far:    BCx NGTD; UCx NGTD; MRSA (-)    COVID Negative  Antibiotics              Zosyn -present  Plan:  - Continue Zosyn for 7D course  - Monitor for signs of infection    Hematology  # Anemia of critical illness  Admission Hgb 13.4, . MARIA (-)     Hematology Labs  Recent Labs   Lab 23  0335 06/15/23  1606 06/15/23  0409   HGB 7.5* 8.3* 8.6*   HCT 23.1* 25.8* 26.9*   PLT 68* 84* 82*     Plan:  - ASA/ticagrelor for PAULINE  - DVT PPX: heparin infusion  - No acute indication for transfusion    Endocrinology  # Hyperglycemia   # Hgb A1c 5.6  No known medical history    Recent Labs   Lab 23  0334 06/15/23  2153 06/15/23  1734 06/15/23  1611   * 239*  214* 195* 206*     Plan:  -SSI as needed    Integumentary:  - No skin issues    Plan  -early mobility protocol     ICU Cares:  CXR: Daily  Fluids/Feeds: reg diet  DVT Prophylaxis: Heparin infusion  GI Prophylaxis: Protonix  Bowel Regimen: senna     Lines/Tubes/Drains:  L 8 Fr femoral arterial sheath IABP  R 6 Fr femoral arterial sheath  R 6 Fr femoral venous sheath  R internal jugular CVC  External catheter  Current lines are required for patient management       Family updated by me    Patient seen, discussed, and plan reviewed with Dr. Giovanny Castro,  APRN CNP  [facility]  ICU Cardiology-CICU Service    Physical exam:  General: In bed, restless, body hurts from activity restrictions  HEENT: PERRL, no scleral icterus or injection  CARDIAC: IRR HR, no m/r/g appreciated. Distal peripheral pulses dopplered  RESP: RA; CTAB, no wheezes, rhonchi or crackles appreciated.  GI: soft, BS hypoactive. No BM  : Mckenzie  EXTREMITIES: No LE edema, pulses 2+. Femoral access site w/o bleeding, dressing c/d/i. No bruits appreciated.   SKIN: No acute lesions appreciated  NEURO: alert, oriented x 3, occasional confusion    /62   Pulse 84   Temp 98.4  F (36.9  C) (Oral)   Resp 23   Wt 102 kg (224 lb 13.9 oz)   SpO2 97%   BMI 33.21 kg/m    Temp:  [98.4  F (36.9  C)-98.9  F (37.2  C)] 98.4  F (36.9  C)  Pulse:  [60-95] 84  Resp:  [14-35] 23  BP: (107-138)/(54-98) 138/62  SpO2:  [89 %-100 %] 97 %    Labs:  Recent Labs   Lab 06/16/23  0334 06/15/23  2153 06/15/23  1606 06/15/23  0409 06/14/23  1557 06/14/23  0353   * 130* 132* 136   < > 134*   POTASSIUM 3.6  3.6 3.2*  3.2* 3.2* 3.9   < > 4.1   CHLORIDE 103 100 99 102   < > 102   CO2 19* 16* 18* 19*   < > 20*   ANIONGAP 10 14 15 15   < > 12   BUN 40.7* 40.3* 42.1* 41.6*   < > 36.7*   CR 1.63* 1.68* 1.70* 1.82*   < > 1.76*   GFRESTIMATED 43* 41* 41* 37*   < > 39*   SUSAN 8.2* 8.3* 8.4* 8.6*   < > 8.3*   MAG 2.1 2.1  --  2.0  --  2.2   PHOS 2.0*  --   --  3.4  --  3.3    < > = values in this interval not displayed.     Recent Labs   Lab 06/16/23  0334 06/15/23  1606 06/15/23  0409   PROTTOTAL 5.4* 5.6* 5.5*   ALBUMIN 2.7* 3.0* 2.8*   BILITOTAL 0.7 0.6 0.8   ALKPHOS 62 67 62   AST 69* 80* 93*   ALT 54 59 64        Recent Labs   Lab 06/16/23  0335 06/15/23  1606 06/15/23  0409   WBC 7.3 11.2* 11.8*   RBC 2.69* 2.99* 3.06*   HGB 7.5* 8.3* 8.6*   HCT 23.1* 25.8* 26.9*   MCV 86 86 88   MCH 27.9 27.8 28.1   MCHC 32.5 32.2 32.0   RDW 15.0 15.0 14.9   PLT 68* 84* 82*     Recent Labs   Lab 06/11/23  1311 06/11/23  0837   INR  1.18* 1.07     No lab results found in last 7 days.  Arterial Blood Gas   Recent Labs   Lab 06/16/23  0334 06/16/23  0025 06/15/23  2328 06/14/23 2003 06/14/23 2001 06/12/23  0431 06/12/23  0427 06/12/23  0156 06/12/23  0052   PH  --   --   --   --  7.45  --  7.40  --  7.36   PCO2  --   --   --   --  33*  --  29*  --  28*   PO2  --   --   --   --  79*  --  92  --  106*   HCO3  --   --   --   --  23  --  18*  --  16*   O2PER 21 21 21   < > 36   < > 37   < > 37    < > = values in this interval not displayed.         Imaging/procedure results:  XR Chest Port 1 View  Narrative: EXAM:  XR CHEST PORT 1 VIEW  6/16/2023 5:54 AM     HISTORY:  daily IABP check       COMPARISON:      6/15/2023, 6/14/2023.    TECHNIQUE: AP supine view of the chest    FINDINGS:   Devices, lines, tubes: Intra-aortic balloon pump marker projects 2 cm  inferior to the anisa at the level of the proximal descending  thoracic aorta. Right internal jugular central venous catheter tip  projects over the low SVC.    The trachea is midline. The cardiomediastinal silhouette is within  normal limits. Solitary hilar opacities.  No appreciable pneumothorax,  pleural effusion, or focal consolidative opacity. No acute osseous  abnormality.    Impression: IMPRESSION:   1. Intra-aortic balloon pump marker is changed position now projecting  2 cm inferior to the anisa, previously at the level of the anisa.  Projecting over the descending thoracic aorta.  2. No substantial change in bilateral perihilar atelectasis and/or  edema.    I have personally reviewed the examination and initial interpretation  and I agree with the findings.    BRANDEN TROTTER MD         SYSTEM ID:  [id number]

## 2023-06-16 NOTE — PROGRESS NOTES
Calorie Count  Intake recorded for: 6/15  Total Kcals: 335 Total Protein: 15g  Kcals from Hospital Food: 335   Protein: 15g  Kcals from Outside Food (average):0 Protein: 0g  # Meals Ordered from Kitchen: 3 meals  # Meals Recorded: 2 meals  (First - 100% yogurt, coffee, 75% fresh fruit cup)  (Second - 50% ice cream, mashed potatoes w/ gravy, 25% mac n cheese)  # Supplements Recorded: 0

## 2023-06-16 NOTE — PROGRESS NOTES
[facility], Procedure Note           Intra-Aortic Balloon Pump Discontinuation:       Kimani Glover  MRN# [id number]   June 16, 2023, 11:26 AM             IABP discontinued: June 16, 2023, 11:24 AM   Removed by: Estrada Fellow   Catheter saved: No      Recorded by Slim Mcgovern, RT

## 2023-06-16 NOTE — PHARMACY-ADMISSION MEDICATION HISTORY
Pharmacist Admission Medication History    Admission medication history is complete. The information provided in this note is only as accurate as the sources available at the time of the update.    Medication reconciliation/reorder completed by provider prior to medication history? No    Information Source(s): Patient and CareEverywhere/SureScripts via in-person    Pertinent Information: Patient reports that he was taking fluoxetine but was recently told to stop by his doctor (~one month ago). He expressed that he would like to be restarted on this and is planning to reach out to his doctor after he leaves the hospital. Did not add this to PTA med list.    Changes made to PTA medication list:    Added: None    Deleted: None    Changed: None    Allergies reviewed with patient and updates made in EHR: no    Medication History Completed By: Lana Shepard [facility] 6/16/2023 1:09 PM    Prior to Admission medications    Medication Sig Last Dose Taking? Auth Provider Long Term End Date   acetaminophen (TYLENOL) 500 MG tablet Take 2 tablets (1,000 mg) by mouth every 6 hours as needed for mild pain Unknown Yes Walt Ignacio DO     aspirin effervescent (HEIDE-SELTZER) 325 MG TBEF tablet Take 1 tablet by mouth 2 times daily as needed Unknown Yes Unknown, Entered By History     benzocaine-menthoL (CEPACOL) 15-3.6 mg [BENZOCAINE-MENTHOL (CEPACOL) 15-3.6 MG] Take 1 lozenge by mouth every hour as needed. Unknown Yes Travis Malhotra II, MD     losartan-hydrochlorothiazide (HYZAAR) 100-12.5 MG tablet Take 1 tablet by mouth daily  Yes Walt Ignacio DO Yes    Multiple Vitamins-Minerals (MENS 50+ MULTI VITAMIN/MIN PO) Take 1 tablet by mouth daily Unknown Yes Unknown, Entered By History     pravastatin (PRAVACHOL) 10 MG tablet [PRAVASTATIN (PRAVACHOL) 10 MG TABLET] Take 10 mg by mouth at bedtime.  Yes Provider, Historical Yes    traZODone (DESYREL) 50 MG tablet [TRAZODONE (DESYREL) 50 MG TABLET] Take 50 mg by mouth at  bedtime.  Yes Provider, Historical     fish oil-omega-3 fatty acids (FISH OIL) 300-1,000 mg capsule [FISH OIL-OMEGA-3 FATTY ACIDS (FISH OIL) 300-1,000 MG CAPSULE] Take 1 g by mouth daily.   Provider, Historical

## 2023-06-17 ENCOUNTER — APPOINTMENT (OUTPATIENT)
Dept: OCCUPATIONAL THERAPY | Facility: CLINIC | Age: 78
DRG: 270 | End: 2023-06-17
Attending: STUDENT IN AN ORGANIZED HEALTH CARE EDUCATION/TRAINING PROGRAM
Payer: COMMERCIAL

## 2023-06-17 ENCOUNTER — APPOINTMENT (OUTPATIENT)
Dept: GENERAL RADIOLOGY | Facility: CLINIC | Age: 78
DRG: 270 | End: 2023-06-17
Attending: INTERNAL MEDICINE
Payer: COMMERCIAL

## 2023-06-17 LAB
ALBUMIN SERPL BCG-MCNC: 3 G/DL (ref 3.5–5.2)
ALBUMIN SERPL BCG-MCNC: 3.2 G/DL (ref 3.5–5.2)
ALP SERPL-CCNC: 63 U/L (ref 40–129)
ALP SERPL-CCNC: 94 U/L (ref 40–129)
ALT SERPL W P-5'-P-CCNC: 50 U/L (ref 0–70)
ALT SERPL W P-5'-P-CCNC: 54 U/L (ref 0–70)
ANION GAP SERPL CALCULATED.3IONS-SCNC: 12 MMOL/L (ref 7–15)
ANION GAP SERPL CALCULATED.3IONS-SCNC: 14 MMOL/L (ref 7–15)
APTT PPP: 28 SECONDS (ref 22–38)
AST SERPL W P-5'-P-CCNC: 49 U/L (ref 0–45)
AST SERPL W P-5'-P-CCNC: 57 U/L (ref 0–45)
BACTERIA BLD CULT: NO GROWTH
BACTERIA BLD CULT: NO GROWTH
BASE EXCESS BLDV CALC-SCNC: -1.4 MMOL/L (ref -7.7–1.9)
BASE EXCESS BLDV CALC-SCNC: -2.6 MMOL/L (ref -7.7–1.9)
BASE EXCESS BLDV CALC-SCNC: -2.9 MMOL/L (ref -7.7–1.9)
BILIRUB SERPL-MCNC: 0.8 MG/DL
BILIRUB SERPL-MCNC: 0.8 MG/DL
BUN SERPL-MCNC: 30.1 MG/DL (ref 8–23)
BUN SERPL-MCNC: 34 MG/DL (ref 8–23)
CALCIUM SERPL-MCNC: 8.5 MG/DL (ref 8.8–10.2)
CALCIUM SERPL-MCNC: 8.8 MG/DL (ref 8.8–10.2)
CHLORIDE SERPL-SCNC: 105 MMOL/L (ref 98–107)
CHLORIDE SERPL-SCNC: 106 MMOL/L (ref 98–107)
CREAT SERPL-MCNC: 1.43 MG/DL (ref 0.67–1.17)
CREAT SERPL-MCNC: 1.43 MG/DL (ref 0.67–1.17)
DEPRECATED HCO3 PLAS-SCNC: 17 MMOL/L (ref 22–29)
DEPRECATED HCO3 PLAS-SCNC: 20 MMOL/L (ref 22–29)
ERYTHROCYTE [DISTWIDTH] IN BLOOD BY AUTOMATED COUNT: 15.1 % (ref 10–15)
GFR SERPL CREATININE-BSD FRML MDRD: 50 ML/MIN/1.73M2
GFR SERPL CREATININE-BSD FRML MDRD: 50 ML/MIN/1.73M2
GLUCOSE BLDC GLUCOMTR-MCNC: 150 MG/DL (ref 70–99)
GLUCOSE BLDC GLUCOMTR-MCNC: 157 MG/DL (ref 70–99)
GLUCOSE BLDC GLUCOMTR-MCNC: 163 MG/DL (ref 70–99)
GLUCOSE BLDC GLUCOMTR-MCNC: 187 MG/DL (ref 70–99)
GLUCOSE SERPL-MCNC: 150 MG/DL (ref 70–99)
GLUCOSE SERPL-MCNC: 160 MG/DL (ref 70–99)
HCO3 BLDV-SCNC: 21 MMOL/L (ref 21–28)
HCO3 BLDV-SCNC: 21 MMOL/L (ref 21–28)
HCO3 BLDV-SCNC: 22 MMOL/L (ref 21–28)
HCT VFR BLD AUTO: 24.7 % (ref 40–53)
HGB BLD-MCNC: 7.9 G/DL (ref 13.3–17.7)
LACTATE SERPL-SCNC: 1 MMOL/L (ref 0.7–2)
LACTATE SERPL-SCNC: 1.7 MMOL/L (ref 0.7–2)
MAGNESIUM SERPL-MCNC: 2 MG/DL (ref 1.7–2.3)
MCH RBC QN AUTO: 28.1 PG (ref 26.5–33)
MCHC RBC AUTO-ENTMCNC: 32 G/DL (ref 31.5–36.5)
MCV RBC AUTO: 88 FL (ref 78–100)
O2/TOTAL GAS SETTING VFR VENT: 21 %
OXYHGB MFR BLDV: 49 % (ref 70–75)
OXYHGB MFR BLDV: 50 % (ref 70–75)
OXYHGB MFR BLDV: 51 % (ref 70–75)
PCO2 BLDV: 30 MM HG (ref 40–50)
PCO2 BLDV: 30 MM HG (ref 40–50)
PCO2 BLDV: 31 MM HG (ref 40–50)
PH BLDV: 7.45 [PH] (ref 7.32–7.43)
PH BLDV: 7.45 [PH] (ref 7.32–7.43)
PH BLDV: 7.46 [PH] (ref 7.32–7.43)
PHOSPHATE SERPL-MCNC: 2.7 MG/DL (ref 2.5–4.5)
PLATELET # BLD AUTO: 79 10E3/UL (ref 150–450)
PO2 BLDV: 29 MM HG (ref 25–47)
PO2 BLDV: 29 MM HG (ref 25–47)
PO2 BLDV: 30 MM HG (ref 25–47)
POTASSIUM SERPL-SCNC: 3.7 MMOL/L (ref 3.4–5.3)
POTASSIUM SERPL-SCNC: 4 MMOL/L (ref 3.4–5.3)
PROT SERPL-MCNC: 5.6 G/DL (ref 6.4–8.3)
PROT SERPL-MCNC: 6.2 G/DL (ref 6.4–8.3)
RBC # BLD AUTO: 2.81 10E6/UL (ref 4.4–5.9)
SODIUM SERPL-SCNC: 136 MMOL/L (ref 136–145)
SODIUM SERPL-SCNC: 138 MMOL/L (ref 136–145)
UFH PPP CHRO-ACNC: <0.1 IU/ML
WBC # BLD AUTO: 6.5 10E3/UL (ref 4–11)

## 2023-06-17 PROCEDURE — 250N000011 HC RX IP 250 OP 636

## 2023-06-17 PROCEDURE — 82805 BLOOD GASES W/O2 SATURATION: CPT | Performed by: STUDENT IN AN ORGANIZED HEALTH CARE EDUCATION/TRAINING PROGRAM

## 2023-06-17 PROCEDURE — 250N000013 HC RX MED GY IP 250 OP 250 PS 637: Performed by: STUDENT IN AN ORGANIZED HEALTH CARE EDUCATION/TRAINING PROGRAM

## 2023-06-17 PROCEDURE — 84450 TRANSFERASE (AST) (SGOT): CPT

## 2023-06-17 PROCEDURE — 83605 ASSAY OF LACTIC ACID: CPT | Performed by: STUDENT IN AN ORGANIZED HEALTH CARE EDUCATION/TRAINING PROGRAM

## 2023-06-17 PROCEDURE — 85027 COMPLETE CBC AUTOMATED: CPT

## 2023-06-17 PROCEDURE — 250N000011 HC RX IP 250 OP 636: Performed by: HOSPITALIST

## 2023-06-17 PROCEDURE — 97165 OT EVAL LOW COMPLEX 30 MIN: CPT | Mod: GO | Performed by: OCCUPATIONAL THERAPIST

## 2023-06-17 PROCEDURE — 97530 THERAPEUTIC ACTIVITIES: CPT | Mod: GO | Performed by: OCCUPATIONAL THERAPIST

## 2023-06-17 PROCEDURE — 85730 THROMBOPLASTIN TIME PARTIAL: CPT

## 2023-06-17 PROCEDURE — 80053 COMPREHEN METABOLIC PANEL: CPT

## 2023-06-17 PROCEDURE — 93010 ELECTROCARDIOGRAM REPORT: CPT | Performed by: INTERNAL MEDICINE

## 2023-06-17 PROCEDURE — 71045 X-RAY EXAM CHEST 1 VIEW: CPT | Mod: 26 | Performed by: RADIOLOGY

## 2023-06-17 PROCEDURE — 83735 ASSAY OF MAGNESIUM: CPT | Performed by: INTERNAL MEDICINE

## 2023-06-17 PROCEDURE — 71045 X-RAY EXAM CHEST 1 VIEW: CPT

## 2023-06-17 PROCEDURE — 85520 HEPARIN ASSAY: CPT | Performed by: INTERNAL MEDICINE

## 2023-06-17 PROCEDURE — 84100 ASSAY OF PHOSPHORUS: CPT | Performed by: INTERNAL MEDICINE

## 2023-06-17 PROCEDURE — 93005 ELECTROCARDIOGRAM TRACING: CPT

## 2023-06-17 PROCEDURE — 120N000002 HC R&B MED SURG/OB UMMC

## 2023-06-17 PROCEDURE — 250N000013 HC RX MED GY IP 250 OP 250 PS 637

## 2023-06-17 PROCEDURE — 99291 CRITICAL CARE FIRST HOUR: CPT | Mod: FS

## 2023-06-17 PROCEDURE — 97535 SELF CARE MNGMENT TRAINING: CPT | Mod: GO | Performed by: OCCUPATIONAL THERAPIST

## 2023-06-17 PROCEDURE — 250N000011 HC RX IP 250 OP 636: Performed by: INTERNAL MEDICINE

## 2023-06-17 PROCEDURE — 250N000013 HC RX MED GY IP 250 OP 250 PS 637: Performed by: NURSE PRACTITIONER

## 2023-06-17 RX ORDER — MAGNESIUM SULFATE HEPTAHYDRATE 40 MG/ML
2 INJECTION, SOLUTION INTRAVENOUS ONCE
Status: COMPLETED | OUTPATIENT
Start: 2023-06-17 | End: 2023-06-17

## 2023-06-17 RX ORDER — FUROSEMIDE 10 MG/ML
80 INJECTION INTRAMUSCULAR; INTRAVENOUS ONCE
Status: COMPLETED | OUTPATIENT
Start: 2023-06-17 | End: 2023-06-17

## 2023-06-17 RX ORDER — FUROSEMIDE 10 MG/ML
80 INJECTION INTRAMUSCULAR; INTRAVENOUS EVERY 6 HOURS
Status: DISCONTINUED | OUTPATIENT
Start: 2023-06-17 | End: 2023-06-17

## 2023-06-17 RX ORDER — POTASSIUM CHLORIDE 1.5 G/1.58G
40 POWDER, FOR SOLUTION ORAL 2 TIMES DAILY
Status: COMPLETED | OUTPATIENT
Start: 2023-06-17 | End: 2023-06-17

## 2023-06-17 RX ORDER — POTASSIUM CHLORIDE 29.8 MG/ML
20 INJECTION INTRAVENOUS ONCE
Status: COMPLETED | OUTPATIENT
Start: 2023-06-17 | End: 2023-06-17

## 2023-06-17 RX ADMIN — PANTOPRAZOLE SODIUM 40 MG: 40 TABLET, DELAYED RELEASE ORAL at 09:10

## 2023-06-17 RX ADMIN — ATORVASTATIN CALCIUM 80 MG: 80 TABLET, FILM COATED ORAL at 21:06

## 2023-06-17 RX ADMIN — ASPIRIN 81 MG: 81 TABLET ORAL at 09:10

## 2023-06-17 RX ADMIN — POTASSIUM CHLORIDE 40 MEQ: 1.5 POWDER, FOR SOLUTION ORAL at 21:07

## 2023-06-17 RX ADMIN — PIPERACILLIN AND TAZOBACTAM 3.38 G: 3; .375 INJECTION, POWDER, LYOPHILIZED, FOR SOLUTION INTRAVENOUS at 11:00

## 2023-06-17 RX ADMIN — Medication 10 MG: at 21:06

## 2023-06-17 RX ADMIN — PIPERACILLIN AND TAZOBACTAM 3.38 G: 3; .375 INJECTION, POWDER, LYOPHILIZED, FOR SOLUTION INTRAVENOUS at 21:06

## 2023-06-17 RX ADMIN — POTASSIUM CHLORIDE 20 MEQ: 29.8 INJECTION, SOLUTION INTRAVENOUS at 06:16

## 2023-06-17 RX ADMIN — TICAGRELOR 90 MG: 90 TABLET ORAL at 21:07

## 2023-06-17 RX ADMIN — QUETIAPINE FUMARATE 50 MG: 50 TABLET ORAL at 21:07

## 2023-06-17 RX ADMIN — MAGNESIUM SULFATE IN WATER 2 G: 40 INJECTION, SOLUTION INTRAVENOUS at 05:19

## 2023-06-17 RX ADMIN — FUROSEMIDE 80 MG: 10 INJECTION, SOLUTION INTRAVENOUS at 12:17

## 2023-06-17 RX ADMIN — FUROSEMIDE 80 MG: 10 INJECTION, SOLUTION INTRAVENOUS at 21:06

## 2023-06-17 RX ADMIN — PIPERACILLIN AND TAZOBACTAM 3.38 G: 3; .375 INJECTION, POWDER, LYOPHILIZED, FOR SOLUTION INTRAVENOUS at 03:37

## 2023-06-17 RX ADMIN — TICAGRELOR 90 MG: 90 TABLET ORAL at 09:10

## 2023-06-17 RX ADMIN — POTASSIUM CHLORIDE 40 MEQ: 1.5 POWDER, FOR SOLUTION ORAL at 12:17

## 2023-06-17 RX ADMIN — PIPERACILLIN AND TAZOBACTAM 3.38 G: 3; .375 INJECTION, POWDER, LYOPHILIZED, FOR SOLUTION INTRAVENOUS at 15:49

## 2023-06-17 ASSESSMENT — ACTIVITIES OF DAILY LIVING (ADL)
PREVIOUS_RESPONSIBILITIES: MEAL PREP;HOUSEKEEPING;MEDICATION MANAGEMENT;FINANCES
ADLS_ACUITY_SCORE: 30
ADLS_ACUITY_SCORE: 30
ADLS_ACUITY_SCORE: 29
ADLS_ACUITY_SCORE: 30
ADLS_ACUITY_SCORE: 29

## 2023-06-17 NOTE — PROGRESS NOTES
Admitted/transferred from: 4E  Reason for admission/transfer: bed needs  Patient status upon admission/transfer: Stable.   Interventions: Continue to monitor CVP, vitals and oxyhgb q4hr.   Plan: Wean dobutamine if oxyhgb stable. Monitor vitals and labs and CVP q4hrs. Diuresis.   2 RN skin assessment: completed by Flor STEPHENSON RN and Zuleima FERMIN RN  Sexual Orientation and Gender Identity (SOGI) smartfom completed: Done. He/Him.  Result of skin assessment and interventions/actions: L arm brusing (marked), 2 sites of bleeding. R arm bruising, 1 site of bleeding. Moist groin, skin tears. R groin dressing, CDI. L groin dressing, CDI. L chest/abdomen/side bruising (marked). Forehead bruising. Generalized bruising.   Height, weight, drug calc weight: Done.   Patient belongings (see Flowsheet - Adult Profile for details): Remains with patient. 1 bag of clothing.   MDRO education (if applicable): Completed.

## 2023-06-17 NOTE — PROGRESS NOTES
"   06/17/23 1018   Appointment Info   Signing Clinician's Name / Credentials (OT) Ashley Hunter OTR/L   Rehab Comments (OT) CR orders. Requested RN release OT/PT orders.   Living Environment   People in Home child(lul), adult;grandchild(lul)   Current Living Arrangements house   Home Accessibility stairs to enter home   Number of Stairs, Main Entrance 8   Stair Railings, Main Entrance railings safe and in good condition   Transportation Anticipated family or friend will provide   Living Environment Comments Pt lives in a split-level entry house with his son, daughter-in-law, and grandchildren (ages 3, 5). Pt's bedroom is on the upper level, ~8 stairs to access. Pt uses a tub/shower combo w/ no AD.   Self-Care   Usual Activity Tolerance good   Current Activity Tolerance moderate   Regular Exercise No   Equipment Currently Used at Home none   Fall history within last six months yes   Number of times patient has fallen within last six months 1   Activity/Exercise/Self-Care Comment Pt is IND w/ ADLs. Pt reports he \"is not as active as he used to be.\"   Instrumental Activities of Daily Living (IADL)   Previous Responsibilities meal prep;housekeeping;medication management;finances   IADL Comments Pt is IND w/ IADLs, shares responsibilities with his family. Pt worked as a  and now enjoys fishing with his grandsons.   General Information   Onset of Illness/Injury or Date of Surgery 06/11/23   Referring Physician Adiel Tobin MD   Patient/Family Therapy Goal Statement (OT) To return home to family and to return to PLOF   Additional Occupational Profile Info/Pertinent History of Current Problem Per EMR: \"Kimani Glover is a 77 year old male who was admitted on 6/11/2023  following STEMI and PCI. Patient presented to [facility] ED after near syncope. Patient was reportedly with his family when he fell and almost lost consciousness. EMS was called and he was found to be lethargic with SBP 58 mmHg and " "bradycardic HR 30s. He was brought to [place] ED where he was found to be bradycardic 32 bpm and hypotensive. EKG showed inferior STEMI. He was started on dopamine and taken to the cath lab for emergent angiography.\"   Existing Precautions/Restrictions fall;cardiac   Left Upper Extremity (Weight-bearing Status) full weight-bearing (FWB)   Right Upper Extremity (Weight-bearing Status) full weight-bearing (FWB)   Left Lower Extremity (Weight-bearing Status) full weight-bearing (FWB)   Right Lower Extremity (Weight-bearing Status) full weight-bearing (FWB)   General Observations and Info Activity order: Ambulate   Cognitive Status Examination   Orientation Status orientation to person, place and time   Cognitive Status Comments Per RN, pt is confused on waking. During OT eval, pt is A&Ox4, following all commands accurately. Pt likes to joke.   Visual Perception   Visual Impairment/Limitations WFL   Impact of Vision Impairment on Function (Vision) WFL for ADLs   Sensory   Sensory Quick Adds sensation intact   Pain Assessment   Patient Currently in Pain Yes, see Vital Sign flowsheet  (Pt reports pain when coughing.)   Posture   Posture not impaired   Range of Motion Comprehensive   General Range of Motion no range of motion deficits identified   Strength Comprehensive (MMT)   Comment, General Manual Muscle Testing (MMT) Assessment BUE strength w/ WFL for basic ADLs, but generally deconditioned.   Coordination   Upper Extremity Coordination No deficits were identified   Bed Mobility   Bed Mobility supine-sit   Supine-Sit [place] (Bed Mobility) supervision   Transfers   Transfers bed-chair transfer   Transfer Skill: Bed to Chair/Chair to Bed   Bed-Chair [place] (Transfers) contact guard   Transfer Comments No AE, pt mildy unsteady. Reporting he feels \"very wobbly because I was down for a couple of days.\"   Sit-Stand Transfer   Sit-Stand [place] (Transfers) contact guard   Balance   Balance Comments Will " continue assess. Requested PT for further balance assessment.   Activities of Daily Living   BADL Assessment/Intervention bathing;upper body dressing;lower body dressing;grooming;toileting   Bathing Assessment/Intervention   Position (Bathing) unsupported sitting   [place] Level (Bathing) minimum assist (75% patient effort)   Comment, (Bathing) per clinical judgment   Upper Body Dressing Assessment/Training   Position (Upper Body Dressing) unsupported sitting   Comment, (Upper Body Dressing) Increased assist to adjust gown 2/2 lines   [place] Level (Upper Body Dressing) moderate assist (50% patient effort)   Lower Body Dressing Assessment/Training   Comment, (Lower Body Dressing) Total A to don socks. Pt reports he uses flip flops and slippers at home 2/2 baseline reduced mobility in R knee.   [place] Level (Lower Body Dressing) dependent (less than 25% patient effort)   Grooming Assessment/Training   Position (Grooming) unsupported sitting   [place] Level (Grooming) set up   Comment, (Grooming) per clinical judgment   Toileting   Comment, (Toileting) per clinical judgment   [place] Level (Toileting) maximum assist (25% patient effort)   Clinical Impression   Criteria for Skilled Therapeutic Interventions Met (OT) Yes, treatment indicated   OT Diagnosis Impaired ADL/IADL IND, reduced endurance 2/2 cardiovascular health   OT Problem List-Impairments impacting ADL problems related to;activity tolerance impaired;balance;mobility;strength   Assessment of Occupational Performance 3-5 Performance Deficits   Identified Performance Deficits dressing, bathing, meal prep, leisure/   Planned Therapy Interventions (OT) ADL retraining;IADL retraining;strengthening;transfer training;home program guidelines;progressive activity/exercise;risk factor education   Intervention Comments OT/CR   Clinical Decision Making Complexity (OT) low complexity   Anticipated Equipment Needs Upon Discharge (OT)    (TBD)   Risk & Benefits of therapy have been explained evaluation/treatment results reviewed;care plan/treatment goals reviewed;risks/benefits reviewed;current/potential barriers reviewed;participants voiced agreement with care plan;participants included;patient   Clinical Impression Comments Pt presents below ADL baseline, limited by reduced activity tolerance, mildly impaired balance. Pt will benefit from IP OT/CR to address above deficits and promote ADL IND and safety.   OT Total Evaluation Time   OT Eval, Low Complexity Minutes (27150) 6   OT Goals   Therapy Frequency (OT) 6 times/wk   OT Predicted Duration/Target Date for Goal Attainment 06/30/23   OT Goals Lower Body Dressing;Toilet Transfer/Toileting;Meal Preparation;Aerobic Activity;Cardiac Phase 1   OT: Lower Body Dressing Modified independent;within precautions   OT: Toilet Transfer/Toileting Modified independent;toilet transfer;cleaning and garment management;using adaptive equipment;within precautions   OT: Meal Preparation Modified independent;with simple meal preparation;using adaptive equipment;within precautions;ambulatory level   OT: Perform aerobic activity with stable cardiovascular response 10 minutes;ambulation;NuStep   OT: Understanding of cardiac education to maximize quality of life, condition management, and health outcomes Patient;Verbalize   Interventions   Interventions Quick Adds Therapeutic Activity   Self-Care/Home Management   Self-Care/Home Mgmt/ADL, Compensatory, Meal Prep Minutes (83577) 10   Symptoms Noted During/After Treatment (Meal Preparation/Planning Training) none   Treatment Detail/Skilled Intervention Pt reporting urgent need to have BM. Commode ordered stat, but delay in arrival 2/2 weekend staffing. Pt completed toileting using bedpan placed on highback chair. Total A for cleaning and garment management while pt stood w/ Min Ax1, arm-in-arm for balance. Pt politely declining other offered ADLs, reporting plan to eat  breakfast, and meal arrived, and session ended.   Therapeutic Activities   Therapeutic Activity Minutes (35524) 23   Symptoms noted during/after treatment fatigue;shortness of breath   Treatment Detail/Skilled Intervention Following initial functional mobility (documented above in evaluation), pt completed additional pivot transfer w/ Min Ax1, arm-in-arm. Pt transferred chair < > scale w/ CGA. Pt mildly unsteady, furniture surfing hand placement. Pt reporting surprise at fatigue following activity. VSS on RA. SpO2 > 95%, HR = low 100s. OT educated pt in grading/pacing return to PLOF, encouraged pt to sit in chair for meals and work w/ RN to maximize OOB activity. Pt's breakfast arriving, so session ended. Extra time needed during session to manage lines and structure environment for pt IND and safety.   OT Discharge Planning   OT Plan Progress mobility, needs CR folder, standing G/H w/ chair behind   OT Discharge Recommendation (DC Rec) Transitional Care Facility;home with assist;home with outpatient cardiac rehab   OT Rationale for DC Rec Pt below ADL baseline, limited by reduced activity tolerance and mildly impaired balance. If pt were to discharge today, recommend TCU, however anticipate pt will progress w/ IP therapies and may be safe to discharge home w/ family and OP CR once medically stable.   OT Brief overview of current status CGA-Min A for pivots   Total Session Time   Timed Code Treatment Minutes 33   Total Session Time (sum of timed and untimed services) 39

## 2023-06-17 NOTE — PROGRESS NOTES
Cardiology ICU Progress Note    Brief HPI:Kimani Glover is a 77 year old male who was admitted on 6/11/2023  following STEMI and PCI. Patient presented to [facility] ED after near syncope. Patient was reportedly with his family when he fell and almost lost consciousness. EMS was called and he was found to be lethargic with SBP 58 mmHg and bradycardic HR 30s. He was brought to [place] ED where he was found to be bradycardic 32 bpm and hypotensive. EKG showed inferior STEMI. He was started on dopamine and taken to the cath lab for emergent angiography.      Coronary angiogram showed occlusion of dominant RCA. PCI was attempted but was complicated by dissection and no-reflow. Temporary pace maker was placed. He was transferred to [facility] CICU for further management.      On arrival patient hemodynamically stable on 12 dopamine and 0.01 epinephrine. Patient comfortable without chest pain, shortness of breath, lightheadedness, dizziness, fevers, chills.     Interval changes:   - Improved delirium  - up in chair and tolerated well  -dobutamine off and hemodynamics stabale  - floor orders    Today's Plan:  -discontinue dobutamine  -Hemodynamics after dobutamine off  -Transfer to floor  - Seroquel to nightly and PRN  - Low dose Oxy for pain  - FBG (-) 1-2 L, 80 mg lasix X2  - Continue Zosyn for 7 D course    ASSESSMENT AND PLAN BY SYSTEM:   Neurology   # Acute delirium  # Acute pain  CAM (+), requring medications for management of agitation, improving today. Pain with positioning in bed    Plan:  - Seroquel 50 nightly, 25 BID PRN  - Encourage day night cycles  - Low dose prn oxy for pain management    Cardiovascular  # Inferior STEMI  # RCA dissection with stenting of proximal to mid RCA  #Non-culprit 80% D1 lesion  # No reflow (DEMARCUS 0)  # IABP in place  # Dyslipidemia  # A-fib  TTE 6/12: Global and regional left ventricular function is normal with an EF of 60-65%.  Global right ventricular function is moderately  reduced.The inferior vena cava is normal.  No pericardial effusion is present.  Angio 6/11: dRCA  70% stenosed, RPDA 50% stenosed.The RCA is a dominant artery withTIMI2 flow. It is stented from the ostium to the genu inferius, the stents are patent.   The RCA has a 70% lesion in its distal segment, that does not require emergency         treatment.   ECG Rhythm: Atrial fibrillation    Current Pressors/inotropes/antiarrythmic:    Invasive Hemodynamic Monitoring:  CVP 10, CI 2.4, SVR 1036, SVO2 51%    Plan:    - discontinue dobutamine at 2.5  - Heparin infusion for a-fib  - FBG (-) 1-2 L, 80 mg lasix  CAD:  - Continue ASA 81mg and ticagrelor 90mg BID for CAD  GDMT:  - Hold lipitor for now given shock liver  - Hold ACE/ARB for now given likely reduced renal fxn after arrest  - Holding beta blocker given shock  - Hold MRA due to likely reduced renal fx after arrest  - Hold SGLT2i due to likely reduced renal fx after arrest    Pulmonary  # Pulmonary edema  # THONG  RA  CXR: IABP, IJ in stable position. Slightly increased edema.     Plan:  - Diuresis as above  - Daily CXR   - Home CPAP at night  - Pulm toileting  - Consider scheduled duonebs if signs of lung dz, currently PRN      Gastrointestinal, Nutrition  #Hypoalbuminemia  -Nutrition: Regular diet  -Last BM: 6/16    Plan:  - Bowel regimen as needed  - Regular diet  - Calorie count  - Protonix PUD ppx    Liver Function Studies   Recent Labs   Lab 06/17/23  0349 06/16/23  1551 06/16/23  0334   PROTTOTAL 5.6* 5.8* 5.4*   ALBUMIN 3.0* 3.1* 2.7*   BILITOTAL 0.8 0.8 0.7   ALKPHOS 63 68 62   AST 49* 60* 69*   ALT 50 56 54       Renal, Electrolytes  # Acute Renal Injury  # Hyponatremia  -Baseline CR 0.80    Intake/Output Summary (Last 24 hours) at 6/16/2023 0816  Last data filed at 6/16/2023 0700  Gross per 24 hour   Intake 2516.27 ml   Output 2371 ml   Net 145.27 ml     Renal Labs  Recent Labs   Lab 06/17/23  0349 06/16/23  1551 06/16/23  0334 06/15/23  2153 06/15/23  1604  06/15/23  0409    139 132* 130*   < > 136   POTASSIUM 3.7 3.6 3.6  3.6 3.2*  3.2*   < > 3.9   CHLORIDE 106 105 103 100   < > 102   CO2 20* 19* 19* 16*   < > 19*   BUN 34.0* 36.1* 40.7* 40.3*   < > 41.6*   CR 1.43* 1.49* 1.63* 1.68*   < > 1.82*   PHOS 2.7  --  2.0*  --   --  3.4   MAG 2.0  --  2.1 2.1  --  2.0    < > = values in this interval not displayed.     Plan:  - Monitor urine output  - Avoid nephrotoxins, renally dose meds  - Diuresis and FBG as above    Infectious Disease  # Aspiration Pneumonia  # Leukocytosis  Recent Labs   Lab 23  03423  1551 23  0335   WBC 6.5 6.6 7.3     Temp (24hrs), Av.7  F (37.1  C), Min:98.4  F (36.9  C), Max:98.9  F (37.2  C)    Cultures thus far:    BCx NGTD; UCx NGTD; MRSA (-)    COVID Negative  Antibiotics              Zosyn -present  Plan:  - Continue Zosyn for 7D course  - Monitor for signs of infection    Hematology  # Anemia of critical illness  Admission Hgb 13.4, . MARIA (-)     Hematology Labs  Recent Labs   Lab 23  03423  1551 23  0335   HGB 7.9* 8.0* 7.5*   HCT 24.7* 24.9* 23.1*   PLT 79* 73* 68*     Plan:  - ASA/ticagrelor for PAULINE  - DVT PPX: heparin infusion  - No acute indication for transfusion    Endocrinology  # Hyperglycemia   # Hgb A1c 5.6  No known medical history    Recent Labs   Lab 23  0349 23  2116 23  1707 23  1551 23  1200   * 162* 175* 170* 172*     Plan:  -SSI as needed    Integumentary:  - No skin issues    Plan  -early mobility protocol     ICU Cares:  CXR: Daily  Fluids/Feeds: reg diet  DVT Prophylaxis: Heparin infusion  GI Prophylaxis: Protonix  Bowel Regimen: senna     Lines/Tubes/Drains:  L 8 Fr femoral arterial sheath IABP  R 6 Fr femoral arterial sheath  R 6 Fr femoral venous sheath  R internal jugular CVC  External catheter  Current lines are required for patient management       Family updated by me    Patient seen, discussed, and  plan reviewed with MARIAH Suarez CNP  [facility]  ICU Cardiology-CICU Service    Physical exam:  General: In bed, restless, body hurts from activity restrictions  HEENT: PERRL, no scleral icterus or injection  CARDIAC: IRR HR, no m/r/g appreciated. Distal peripheral pulses dopplered  RESP: RA; CTAB, no wheezes, rhonchi or crackles appreciated.  GI: soft, BS hypoactive. No BM  : Mckenzie  EXTREMITIES: No LE edema, pulses 2+. Femoral access site w/o bleeding, dressing c/d/i. No bruits appreciated.   SKIN: No acute lesions appreciated  NEURO: alert, oriented x 3, occasional confusion    /59   Pulse 80   Temp 98.2  F (36.8  C) (Oral)   Resp 20   Wt 102 kg (224 lb 13.9 oz)   SpO2 97%   BMI 33.21 kg/m    Temp:  [97.4  F (36.3  C)-98.2  F (36.8  C)] 98.2  F (36.8  C)  Pulse:  [71-93] 80  Resp:  [13-35] 20  BP: (103-133)/(51-63) 128/59  SpO2:  [95 %-100 %] 97 %    Labs:  Recent Labs   Lab 06/17/23  0349 06/16/23  1551 06/16/23  0334 06/15/23  2153 06/15/23  1606 06/15/23  0409    139 132* 130*   < > 136   POTASSIUM 3.7 3.6 3.6  3.6 3.2*  3.2*   < > 3.9   CHLORIDE 106 105 103 100   < > 102   CO2 20* 19* 19* 16*   < > 19*   ANIONGAP 12 15 10 14   < > 15   BUN 34.0* 36.1* 40.7* 40.3*   < > 41.6*   CR 1.43* 1.49* 1.63* 1.68*   < > 1.82*   GFRESTIMATED 50* 47* 43* 41*   < > 37*   SUSAN 8.5* 8.6* 8.2* 8.3*   < > 8.6*   MAG 2.0  --  2.1 2.1  --  2.0   PHOS 2.7  --  2.0*  --   --  3.4    < > = values in this interval not displayed.     Recent Labs   Lab 06/17/23  0349 06/16/23  1551 06/16/23  0334   PROTTOTAL 5.6* 5.8* 5.4*   ALBUMIN 3.0* 3.1* 2.7*   BILITOTAL 0.8 0.8 0.7   ALKPHOS 63 68 62   AST 49* 60* 69*   ALT 50 56 54        Recent Labs   Lab 06/17/23 0349 06/16/23  1551 06/16/23  0335   WBC 6.5 6.6 7.3   RBC 2.81* 2.86* 2.69*   HGB 7.9* 8.0* 7.5*   HCT 24.7* 24.9* 23.1*   MCV 88 87 86   MCH 28.1 28.0 27.9   MCHC 32.0 32.1 32.5   RDW 15.1* 14.9 15.0   PLT 79* 73* 68*      Recent Labs   Lab 06/11/23  1311 06/11/23  0837   INR 1.18* 1.07     No lab results found in last 7 days.  Arterial Blood Gas   Recent Labs   Lab 06/17/23  0349 06/16/23  2307 06/16/23  2104 06/14/23 2003 06/14/23 2001 06/12/23  0431 06/12/23  0427 06/12/23  0156 06/12/23  0052   PH  --   --   --   --  7.45  --  7.40  --  7.36   PCO2  --   --   --   --  33*  --  29*  --  28*   PO2  --   --   --   --  79*  --  92  --  106*   HCO3  --   --   --   --  23  --  18*  --  16*   O2PER 21 21 21   < > 36   < > 37   < > 37    < > = values in this interval not displayed.         Imaging/procedure results:  XR Chest Port 1 View  Narrative: EXAM: XR CHEST PORT 1 VIEW  6/17/2023 12:40 AM     HISTORY:  daily IABP check       COMPARISON:  Radiographs 6/16/2023, 6/15/2023.    TECHNIQUE: AP view the chest at 60 degrees    FINDINGS:   Devices, lines, tubes: Interval removal of intra-aortic balloon pump.  Right internal jugular central venous catheter tip projecting over the  low SVC.    The trachea is midline. The cardiomediastinal silhouette is within  normal limits. Increased left basilar opacities.  Stable bilateral  perihilar opacities. No appreciable pneumothorax. No acute osseous  abnormality.    Impression: IMPRESSION:   1. Interval removal of intra-aortic balloon pump.  2. Increased left basilar opacities, may represent atelectasis or  aspiration.  3. Stable bilateral perihilar atelectasis and/or edema.    I have personally reviewed the examination and initial interpretation  and I agree with the findings.    GIGI ANN MD         SYSTEM ID:  [id number]

## 2023-06-17 NOTE — PLAN OF CARE
Neuro: AOx4, forgetful and intermittently confused upon awakening. PERRLA. Follows commands, able to make needs known, BRADLEY. C/o pain in back and chest, aching - PRN tylenol given and effective. Afebrile.   CV: Afib, HR 70-80s, with stable BP. CVP 9-13. Dobutamine gtt @ 2.5. Notified MD of 37-41% SvO2, no orders received. Last SvO2 50%. Pulses dopplerable. Electrolytes replaced per protocol (K+, Mg++).  Resp: SpO2 >92 % on RA. Tachypneic, RR 20s. LS clear-dim. Infrequent, dry cough.   GI/: Loose BMs x2, intermittently continent with bedpan. Reg diet. Mckenzie in place, adequate output. IV Lasix given as scheduled with good output.   Skin: Ecchymotic, g  L/D/A: R-internal jugular CVC, triple lumen. PIV x1.  Gtt: Dobutamine gtt @ 2.5.     Plan: Continue to monitor and treat per plan of care. Possible floor orders if pt remains hemodynamically stable.     For vital signs and complete assessments, please see documentation flowsheets.        Goal Outcome Evaluation:  Plan of Care Reviewed With: patient  Overall Patient Progress: improving

## 2023-06-17 NOTE — PLAN OF CARE
ICU End of Shift Summary. See flowsheets for vital signs and detailed assessment.    Changes this shift: Alert and orient x4. Denies pain, nausea. Dobutamine stopped, VBG oxyhgb 50. Up to chair x2. Encourage PO intake, patient on ally counts. Lasix, good UOP, james removed, external placed, some leaking/missed urine amount due to soak on chux. Start anticoag pill tomorrow. HR drops 50's, recovers within seconds.     Plan: R internal jugular line can be removed. Monitor patient. Continue with POC.

## 2023-06-18 ENCOUNTER — APPOINTMENT (OUTPATIENT)
Dept: PHYSICAL THERAPY | Facility: CLINIC | Age: 78
DRG: 270 | End: 2023-06-18
Attending: HOSPITALIST
Payer: COMMERCIAL

## 2023-06-18 LAB
ALBUMIN SERPL BCG-MCNC: 3.2 G/DL (ref 3.5–5.2)
ALBUMIN SERPL BCG-MCNC: 3.3 G/DL (ref 3.5–5.2)
ALP SERPL-CCNC: 107 U/L (ref 40–129)
ALP SERPL-CCNC: 82 U/L (ref 40–129)
ALT SERPL W P-5'-P-CCNC: 52 U/L (ref 0–70)
ALT SERPL W P-5'-P-CCNC: 57 U/L (ref 0–70)
ANION GAP SERPL CALCULATED.3IONS-SCNC: 12 MMOL/L (ref 7–15)
ANION GAP SERPL CALCULATED.3IONS-SCNC: 15 MMOL/L (ref 7–15)
APTT PPP: 28 SECONDS (ref 22–38)
AST SERPL W P-5'-P-CCNC: 53 U/L (ref 0–45)
AST SERPL W P-5'-P-CCNC: 61 U/L (ref 0–45)
BILIRUB SERPL-MCNC: 0.9 MG/DL
BILIRUB SERPL-MCNC: 0.9 MG/DL
BUN SERPL-MCNC: 27.9 MG/DL (ref 8–23)
BUN SERPL-MCNC: 28.5 MG/DL (ref 8–23)
CALCIUM SERPL-MCNC: 8.5 MG/DL (ref 8.8–10.2)
CALCIUM SERPL-MCNC: 9 MG/DL (ref 8.8–10.2)
CHLORIDE SERPL-SCNC: 107 MMOL/L (ref 98–107)
CHLORIDE SERPL-SCNC: 109 MMOL/L (ref 98–107)
CREAT SERPL-MCNC: 1.3 MG/DL (ref 0.67–1.17)
CREAT SERPL-MCNC: 1.34 MG/DL (ref 0.67–1.17)
DEPRECATED HCO3 PLAS-SCNC: 15 MMOL/L (ref 22–29)
DEPRECATED HCO3 PLAS-SCNC: 17 MMOL/L (ref 22–29)
ERYTHROCYTE [DISTWIDTH] IN BLOOD BY AUTOMATED COUNT: 15.3 % (ref 10–15)
GFR SERPL CREATININE-BSD FRML MDRD: 54 ML/MIN/1.73M2
GFR SERPL CREATININE-BSD FRML MDRD: 56 ML/MIN/1.73M2
GLUCOSE BLDC GLUCOMTR-MCNC: 117 MG/DL (ref 70–99)
GLUCOSE BLDC GLUCOMTR-MCNC: 133 MG/DL (ref 70–99)
GLUCOSE BLDC GLUCOMTR-MCNC: 134 MG/DL (ref 70–99)
GLUCOSE BLDC GLUCOMTR-MCNC: 211 MG/DL (ref 70–99)
GLUCOSE SERPL-MCNC: 140 MG/DL (ref 70–99)
GLUCOSE SERPL-MCNC: 151 MG/DL (ref 70–99)
HCT VFR BLD AUTO: 26.6 % (ref 40–53)
HGB BLD-MCNC: 8.3 G/DL (ref 13.3–17.7)
MAGNESIUM SERPL-MCNC: 2.2 MG/DL (ref 1.7–2.3)
MCH RBC QN AUTO: 27.6 PG (ref 26.5–33)
MCHC RBC AUTO-ENTMCNC: 31.2 G/DL (ref 31.5–36.5)
MCV RBC AUTO: 88 FL (ref 78–100)
PHOSPHATE SERPL-MCNC: 2.8 MG/DL (ref 2.5–4.5)
PLATELET # BLD AUTO: 122 10E3/UL (ref 150–450)
POTASSIUM SERPL-SCNC: 3.9 MMOL/L (ref 3.4–5.3)
POTASSIUM SERPL-SCNC: 4.2 MMOL/L (ref 3.4–5.3)
PROT SERPL-MCNC: 6 G/DL (ref 6.4–8.3)
PROT SERPL-MCNC: 6.7 G/DL (ref 6.4–8.3)
RBC # BLD AUTO: 3.01 10E6/UL (ref 4.4–5.9)
SODIUM SERPL-SCNC: 137 MMOL/L (ref 136–145)
SODIUM SERPL-SCNC: 138 MMOL/L (ref 136–145)
WBC # BLD AUTO: 10 10E3/UL (ref 4–11)

## 2023-06-18 PROCEDURE — 99233 SBSQ HOSP IP/OBS HIGH 50: CPT | Mod: FS

## 2023-06-18 PROCEDURE — 250N000013 HC RX MED GY IP 250 OP 250 PS 637: Performed by: STUDENT IN AN ORGANIZED HEALTH CARE EDUCATION/TRAINING PROGRAM

## 2023-06-18 PROCEDURE — 84155 ASSAY OF PROTEIN SERUM: CPT

## 2023-06-18 PROCEDURE — 97530 THERAPEUTIC ACTIVITIES: CPT | Mod: GP

## 2023-06-18 PROCEDURE — 80053 COMPREHEN METABOLIC PANEL: CPT

## 2023-06-18 PROCEDURE — 250N000011 HC RX IP 250 OP 636

## 2023-06-18 PROCEDURE — 83735 ASSAY OF MAGNESIUM: CPT | Performed by: INTERNAL MEDICINE

## 2023-06-18 PROCEDURE — 84450 TRANSFERASE (AST) (SGOT): CPT

## 2023-06-18 PROCEDURE — 36415 COLL VENOUS BLD VENIPUNCTURE: CPT

## 2023-06-18 PROCEDURE — 250N000011 HC RX IP 250 OP 636: Performed by: HOSPITALIST

## 2023-06-18 PROCEDURE — 97161 PT EVAL LOW COMPLEX 20 MIN: CPT | Mod: GP

## 2023-06-18 PROCEDURE — 250N000013 HC RX MED GY IP 250 OP 250 PS 637

## 2023-06-18 PROCEDURE — 84100 ASSAY OF PHOSPHORUS: CPT | Performed by: INTERNAL MEDICINE

## 2023-06-18 PROCEDURE — 250N000013 HC RX MED GY IP 250 OP 250 PS 637: Performed by: NURSE PRACTITIONER

## 2023-06-18 PROCEDURE — 85730 THROMBOPLASTIN TIME PARTIAL: CPT

## 2023-06-18 PROCEDURE — 85027 COMPLETE CBC AUTOMATED: CPT

## 2023-06-18 PROCEDURE — 120N000002 HC R&B MED SURG/OB UMMC

## 2023-06-18 RX ORDER — POTASSIUM CHLORIDE 1.5 G/1.58G
40 POWDER, FOR SOLUTION ORAL 2 TIMES DAILY
Status: COMPLETED | OUTPATIENT
Start: 2023-06-18 | End: 2023-06-18

## 2023-06-18 RX ORDER — FUROSEMIDE 10 MG/ML
80 INJECTION INTRAMUSCULAR; INTRAVENOUS ONCE
Status: COMPLETED | OUTPATIENT
Start: 2023-06-18 | End: 2023-06-18

## 2023-06-18 RX ADMIN — QUETIAPINE FUMARATE 50 MG: 50 TABLET ORAL at 21:16

## 2023-06-18 RX ADMIN — PANTOPRAZOLE SODIUM 40 MG: 40 TABLET, DELAYED RELEASE ORAL at 07:57

## 2023-06-18 RX ADMIN — POTASSIUM CHLORIDE 40 MEQ: 1.5 POWDER, FOR SOLUTION ORAL at 08:39

## 2023-06-18 RX ADMIN — ATORVASTATIN CALCIUM 80 MG: 80 TABLET, FILM COATED ORAL at 21:16

## 2023-06-18 RX ADMIN — Medication 10 MG: at 21:16

## 2023-06-18 RX ADMIN — ASPIRIN 81 MG: 81 TABLET ORAL at 07:57

## 2023-06-18 RX ADMIN — TICAGRELOR 90 MG: 90 TABLET ORAL at 21:16

## 2023-06-18 RX ADMIN — ACETAMINOPHEN 650 MG: 325 TABLET, FILM COATED ORAL at 08:39

## 2023-06-18 RX ADMIN — APIXABAN 10 MG: 5 TABLET, FILM COATED ORAL at 21:16

## 2023-06-18 RX ADMIN — TICAGRELOR 90 MG: 90 TABLET ORAL at 07:57

## 2023-06-18 RX ADMIN — PIPERACILLIN AND TAZOBACTAM 3.38 G: 3; .375 INJECTION, POWDER, LYOPHILIZED, FOR SOLUTION INTRAVENOUS at 08:02

## 2023-06-18 RX ADMIN — FUROSEMIDE 80 MG: 10 INJECTION, SOLUTION INTRAVENOUS at 08:39

## 2023-06-18 RX ADMIN — APIXABAN 10 MG: 5 TABLET, FILM COATED ORAL at 08:39

## 2023-06-18 RX ADMIN — PIPERACILLIN AND TAZOBACTAM 3.38 G: 3; .375 INJECTION, POWDER, LYOPHILIZED, FOR SOLUTION INTRAVENOUS at 03:11

## 2023-06-18 ASSESSMENT — ACTIVITIES OF DAILY LIVING (ADL)
ADLS_ACUITY_SCORE: 29
ADLS_ACUITY_SCORE: 30
ADLS_ACUITY_SCORE: 29
ADLS_ACUITY_SCORE: 30
ADLS_ACUITY_SCORE: 38
ADLS_ACUITY_SCORE: 33
ADLS_ACUITY_SCORE: 30
ADLS_ACUITY_SCORE: 30

## 2023-06-18 NOTE — PROGRESS NOTES
Cardiology ICU Progress Note    Brief HPI:Kimani Glover is a 77 year old male who was admitted on 6/11/2023  following STEMI and PCI. Patient presented to [facility] ED after near syncope. Patient was reportedly with his family when he fell and almost lost consciousness. EMS was called and he was found to be lethargic with SBP 58 mmHg and bradycardic HR 30s. He was brought to [facility] ED where he was found to be bradycardic 32 bpm and hypotensive. EKG showed inferior STEMI. He was started on dopamine and taken to the cath lab for emergent angiography.      Coronary angiogram showed occlusion of dominant RCA. PCI was attempted but was complicated by dissection and no-reflow. Temporary pace maker was placed. He was transferred to [facility] CICU for further management.      On arrival patient hemodynamically stable on 12 dopamine and 0.01 epinephrine. Patient comfortable without chest pain, shortness of breath, lightheadedness, dizziness, fevers, chills.     Interval changes:   No acute events over the night. Slept well without seroquel. Dobutamine stopped yesterday and has been hemodynamically stable. Will start DOAC today. Transfer to floor when bed available    Today's Plan:  -Transfer to floor  - Low dose Oxy for pain  - FBG (-) 1-2 L, 80 mg lasix X2  - Continue Zosyn for 7 D course  - DOAC for afib    ASSESSMENT AND PLAN BY SYSTEM:   Neurology   # Acute delirium  # Acute pain  CAM (+), requring medications for management of agitation, improving today. 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  Global right ventricular function is moderately reduced.The inferior  vena cava is normal.  No pericardial effusion is present.  Angio 6/11: dRCA  70% stenosed, RPDA 50% stenosed.The RCA is a dominant artery withTIMI2 flow. It is stented from the ostium to the genu inferius, the stents are patent.   The RCA has a 70% lesion in its distal segment, that does not require emergency         treatment.   ECG Rhythm: Sinus rhythm      Plan:  - doac for a-fib  - FBG (-) 1-2 L, 80 mg lasix X2  CAD:  - Continue ASA 81mg and ticagrelor 90mg BID for CAD  GDMT:  - Hold lipitor for now given shock liver  - Hold ACE/ARB for now given likely reduced renal fxn after arrest  - Holding beta blocker given shock  - Hold MRA due to likely reduced renal fx after arrest  - Hold SGLT2i due to likely reduced renal fx after arrest    Pulmonary  # Pulmonary edema  # THONG  RA      Plan:  - Diuresis as above  - Daily CXR   - Home CPAP at night  - Pulm toileting  - Consider scheduled duonebs if signs of lung dz, currently PRN      Gastrointestinal, Nutrition  #Hypoalbuminemia  -Nutrition: Regular diet  -Last BM: 6/17    Plan:  - Bowel regimen as needed  - Regular diet  - Calorie count  - Protonix PUD ppx    Liver Function Studies   Recent Labs   Lab 06/18/23  0319 06/17/23  1604 06/17/23  0349   PROTTOTAL 6.0* 6.2* 5.6*   ALBUMIN 3.2* 3.2* 3.0*   BILITOTAL 0.9 0.8 0.8   ALKPHOS 82 94 63   AST 53* 57* 49*   ALT 52 54 50       Renal, Electrolytes  # Acute Renal Injury  # Hyponatremia  -Baseline CR 0.80    Intake/Output Summary (Last 24 hours) at 6/18/2023 0754  Last data filed at 6/18/2023 0700  Gross per 24 hour   Intake 1458.2 ml   Output 3145 ml   Net -1686.8 ml           Renal Labs  Recent Labs   Lab 06/18/23  0319 06/17/23  1604 06/17/23  0349 06/16/23  1551 06/16/23  0334    136 138   < > 132*   POTASSIUM 3.9 4.0 3.7   < > 3.6  3.6   CHLORIDE 109* 105 106   < > 103   CO2 17* 17* 20*   < > 19*   BUN 27.9* 30.1* 34.0*   < > 40.7*   CR 1.34* 1.43* 1.43*   < > 1.63*   PHOS 2.8  --  2.7  --  2.0*   MAG 2.2  --   2.0  --  2.1    < > = values in this interval not displayed.     Plan:  - Monitor urine output  - Avoid nephrotoxins, renally dose meds  - Diuresis and FBG as above    Infectious Disease  # Aspiration Pneumonia  # Leukocytosis  Recent Labs   Lab 23  0349 23  1551   WBC 10.0 6.5 6.6     Temp (24hrs), Av.7  F (37.1  C), Min:98.4  F (36.9  C), Max:98.9  F (37.2  C)    Cultures thus far:    BCx NGTD; UCx NGTD; MRSA (-)    COVID Negative  Antibiotics              Zosyn -present  Plan:  - Continue Zosyn for 7D course  - Monitor for signs of infection    Hematology  # Anemia of critical illness  Admission Hgb 13.4, . MARIA (-)     Hematology Labs  Recent Labs   Lab 23  0349 23  1551   HGB 8.3* 7.9* 8.0*   HCT 26.6* 24.7* 24.9*   * 79* 73*     Plan:  - ASA/ticagrelor for PAULINE  - DVT PPX: DOAC  - No acute indication for transfusion    Endocrinology  # Hyperglycemia   # Hgb A1c 5.6  No known medical history    Recent Labs   Lab 23  0319 23  2119 23  1604 23  1602 23  1215   * 163* 160* 150* 187*     Plan:  -SSI as needed    Integumentary:  - No skin issues    Plan  -early mobility protocol    Lines/Tubes/Drains:  PIV  Current lines are required for patient management       Family updated by me    Patient seen, discussed, and plan reviewed with Dr. Giovanny Mendoza DNP, APRN, CNP  [facility]  [phone]      Physical exam:  General: In bed, restless, body hurts from activity restrictions  HEENT: PERRL, no scleral icterus or injection  CARDIAC: IRR HR, no m/r/g appreciated. Distal peripheral pulses dopplered  RESP: RA; CTAB, no wheezes, rhonchi or crackles appreciated.  GI: soft, BS hypoactive. No BM  : Mckenzie  EXTREMITIES: No LE edema, pulses 2+. Femoral access site w/o bleeding, dressing c/d/i. No bruits appreciated.   SKIN: No acute lesions appreciated  NEURO: alert, oriented x 3,  occasional confusion    /67   Pulse 84   Temp 97.9  F (36.6  C)   Resp 26   Wt 96.9 kg (213 lb 10 oz)   SpO2 100%   BMI 31.55 kg/m    Temp:  [97.7  F (36.5  C)-99.6  F (37.6  C)] 97.9  F (36.6  C)  Pulse:  [75-98] 84  Resp:  [8-37] 26  BP: ()/(44-84) 106/67  SpO2:  [80 %-100 %] 100 %    Labs:  Recent Labs   Lab 06/18/23 0319 06/17/23  1604 06/17/23  0349 06/16/23  1551 06/16/23  0334    136 138   < > 132*   POTASSIUM 3.9 4.0 3.7   < > 3.6  3.6   CHLORIDE 109* 105 106   < > 103   CO2 17* 17* 20*   < > 19*   ANIONGAP 12 14 12   < > 10   BUN 27.9* 30.1* 34.0*   < > 40.7*   CR 1.34* 1.43* 1.43*   < > 1.63*   GFRESTIMATED 54* 50* 50*   < > 43*   SUASN 8.5* 8.8 8.5*   < > 8.2*   MAG 2.2  --  2.0  --  2.1   PHOS 2.8  --  2.7  --  2.0*    < > = values in this interval not displayed.     Recent Labs   Lab 06/18/23 0319 06/17/23 1604 06/17/23  0349   PROTTOTAL 6.0* 6.2* 5.6*   ALBUMIN 3.2* 3.2* 3.0*   BILITOTAL 0.9 0.8 0.8   ALKPHOS 82 94 63   AST 53* 57* 49*   ALT 52 54 50        Recent Labs   Lab 06/18/23 0319 06/17/23 0349 06/16/23  1551   WBC 10.0 6.5 6.6   RBC 3.01* 2.81* 2.86*   HGB 8.3* 7.9* 8.0*   HCT 26.6* 24.7* 24.9*   MCV 88 88 87   MCH 27.6 28.1 28.0   MCHC 31.2* 32.0 32.1   RDW 15.3* 15.1* 14.9   * 79* 73*     Recent Labs   Lab 06/11/23  1311 06/11/23  0837   INR 1.18* 1.07     No lab results found in last 7 days.  Arterial Blood Gas   Recent Labs   Lab 06/17/23  1305 06/17/23  0918 06/17/23  0349 06/14/23 2003 06/14/23 2001 06/12/23  0431 06/12/23  0427 06/12/23  0156 06/12/23  0052   PH  --   --   --   --  7.45  --  7.40  --  7.36   PCO2  --   --   --   --  33*  --  29*  --  28*   PO2  --   --   --   --  79*  --  92  --  106*   HCO3  --   --   --   --  23  --  18*  --  16*   O2PER 21 21 21   < > 36   < > 37   < > 37    < > = values in this interval not displayed.         Imaging/procedure results:  XR Chest Port 1 View  Narrative: EXAM: XR CHEST PORT 1 VIEW  6/17/2023 12:40  AM     HISTORY:  daily IABP check       COMPARISON:  Radiographs 6/16/2023, 6/15/2023.    TECHNIQUE: AP view the chest at 60 degrees    FINDINGS:   Devices, lines, tubes: Interval removal of intra-aortic balloon pump.  Right internal jugular central venous catheter tip projecting over the  low SVC.    The trachea is midline. The cardiomediastinal silhouette is within  normal limits. Increased left basilar opacities.  Stable bilateral  perihilar opacities. No appreciable pneumothorax. No acute osseous  abnormality.    Impression: IMPRESSION:   1. Interval removal of intra-aortic balloon pump.  2. Increased left basilar opacities, may represent atelectasis or  aspiration.  3. Stable bilateral perihilar atelectasis and/or edema.    I have personally reviewed the examination and initial interpretation  and I agree with the findings.    GIGI ANN MD         SYSTEM ID:  [id number]

## 2023-06-18 NOTE — PLAN OF CARE
ICU End of Shift Summary. See flowsheets for vital signs and detailed assessment.    Changes this shift: See prior note for cardiac events over night.  CVP 11 prior to Internal jugular removal. X1 loose stool. Pt slept well denied pain all night. Adequate urine output with external cath In place.     Plan:  Transfer to 6 C when bed becomes available   Goal Outcome Evaluation:

## 2023-06-18 NOTE — PLAN OF CARE
ICU End of Shift Summary. See flowsheets for vital signs and detailed assessment.    Changes this shift: Alert and orient x4, comical. Up to chair. Used IS, practiced DB. Patient reports being dizzy with any movement, sitting up or up to chair, missed primary team, please address with patient, hindering therapy. Patient has capability to stand and walk in place. Patient had a bradycardic episode HR 50-60's when standing and walking in place, felt dizzy and then improved after sitting. Loose stool x1. Tried external cath, patient too dizzy to keep standing up and using commode, difficult with urinal due to inverted anatomy, difficult to stay in place, a few missed urination episode.     Plan: Monitor patient status and notify with team. Continue with POC.

## 2023-06-18 NOTE — PROGRESS NOTES
IP PT Evaluation:    Padilla Reyes PT  Pager #[phone]     06/18/23 8755   Appointment Info   Signing Clinician's Name / Credentials (PT) Padilla Reyes PT, DPT   Rehab Comments (PT) monitor vitals   Living Environment   People in Home child(lul), adult;grandchild(lul)   Current Living Arrangements house   Home Accessibility stairs to enter home   Number of Stairs, Main Entrance 4   Stair Railings, Main Entrance railings on both sides of stairs;railings safe and in good condition   Transportation Anticipated family or friend will provide   Living Environment Comments Pt lives in a house with their son, DIL, and grandchildren (ages 3, 5). All needs met on main level. Tub/shower combo.   Self-Care   Regular Exercise No   Equipment Currently Used at Home none   Fall history within last six months yes   Number of times patient has fallen within last six months 1   Activity/Exercise/Self-Care Comment IND with mobility and I/ADLs at baseline.   General Information   Onset of Illness/Injury or Date of Surgery 06/17/23   Referring Physician Bernardo Tarango MD   Patient/Family Therapy Goals Statement (PT) Return home   Pertinent History of Current Problem (include personal factors and/or comorbidities that impact the POC) Per EMR: Kimani Glover is a 77 year old male who was admitted on 6/11/2023  following STEMI and PCI. Patient presented to [facility] ED after near syncope. Patient was reportedly with his family when he fell and almost lost consciousness. EMS was called and he was found to be lethargic with SBP 58 mmHg and bradycardic HR 30s. He was brought to [place] ED where he was found to be bradycardic 32 bpm and hypotensive. EKG showed inferior STEMI. He was started on dopamine and taken to the cath lab for emergent angiography.   Existing Precautions/Restrictions cardiac;fall   General Observations Pt seated up in recliner, pleasant, agreeble to session.   Cognition   Affect/Mental Status (Cognition)  WFL   Follows Commands (Cognition) WFL   Cognitive Status Comments Like to joke   Posture    Posture Forward head position;Protracted shoulders   Range of Motion (ROM)   Range of Motion ROM is WFL   Strength (Manual Muscle Testing)   Strength (Manual Muscle Testing) Deficits observed during functional mobility   Strength Comments Grossly deconditioned; moves all extremities against gravity; no focal deficits noted.   Bed Mobility   Comment, (Bed Mobility) Impaired per clinical reasoning   Transfers   Comment, (Transfers) CGA sit<>stand   Gait/Stairs (Locomotion)   Comment, (Gait/Stairs) CGA, no AD   Balance   Balance Comments IND seated; CGA static/dynamic stance without AD   Sensory Examination   Sensory Perception patient reports no sensory changes   Coordination   Coordination no deficits were identified   Muscle Tone   Muscle Tone no deficits were identified   Clinical Impression   Criteria for Skilled Therapeutic Intervention Yes, treatment indicated   PT Diagnosis (PT) Impaired functional mobility   Influenced by the following impairments Generalized weakness/deconditioning, dizziness   Functional limitations due to impairments Activity tolerance, gait, transfers, bed mobility, stairs   Clinical Presentation (PT Evaluation Complexity) Stable/Uncomplicated   Clinical Presentation Rationale Clinical judgment   Clinical Decision Making (Complexity) low complexity   Planned Therapy Interventions (PT) balance training;bed mobility training;gait training;home exercise program;patient/family education;stair training;strengthening;risk factor education;home program guidelines;progressive activity/exercise;transfer training   Anticipated Equipment Needs at Discharge (PT)   (tbd)   Risk & Benefits of therapy have been explained evaluation/treatment results reviewed;care plan/treatment goals reviewed;risks/benefits reviewed;current/potential barriers reviewed;participants voiced agreement with care plan;participants  included;patient   PT Total Evaluation Time   PT Eval, Low Complexity Minutes (72021) 5   Physical Therapy Goals   PT Frequency 6x/week   PT Predicted Duration/Target Date for Goal Attainment 07/16/23   PT Goals Bed Mobility;Transfers;Gait;Stairs   PT: Bed Mobility Independent;Supine to/from sit   PT: Transfers Independent;Sit to/from stand   PT: Gait Independent;Greater than 200 feet  (LRAD vs no AD)   PT: Stairs Modified independent;4 stairs;Rail on both sides   PT Discharge Planning   PT Plan Gait with LRAD, stairs, monitor vitals/dizziness   PT Discharge Recommendation (DC Rec) home with assist;home with outpatient cardiac rehab   PT Rationale for DC Rec Limited evaluation 2/2 dizziness, anticipate with symptom improvement and IP therapies pt will progress to home discharge with OP cardiac rehab.   PT Brief overview of current status Ax1 transfers; Encourage up to chair as able   Total Session Time   Total Session Time (sum of timed and untimed services) 5

## 2023-06-18 NOTE — PROGRESS NOTES
Calorie Count  Intake recorded for: 6/17  Total Kcals: 725 Total Protein: 25g  Kcals from Hospital Food: 725   Protein: 25g  Kcals from Outside Food (average):0 Protein: 0g  # Meals Ordered from Kitchen: 2 ordered   # Meals Recorded: 2 recorded (First - 100% yogurt, 75% mixed fruit, 50% sausage lucretia, 25% breakfast sandwich with pork sausage, scrambled eggs, and cheese)      (Second - 100% lemon fruit ice, 25% entree caesar salad)   # Supplements Recorded: no intake recorded

## 2023-06-18 NOTE — PROGRESS NOTES
New ST-elevation noted on tele around 23:35. Pt HR in 80's w/ first degree AV block. Pt sleeping, aroused to voice. BP stable 110/80 (89). Denies chest pain or shortness of breath. 12-lead obtained showing SR w/ 1st degree AV block, inferior infarct.   CSI provider called and updated at 23:51.  Provider came to bedside at 00:00 verifying no new concerns or orders.

## 2023-06-19 ENCOUNTER — APPOINTMENT (OUTPATIENT)
Dept: OCCUPATIONAL THERAPY | Facility: CLINIC | Age: 78
DRG: 270 | End: 2023-06-19
Attending: INTERNAL MEDICINE
Payer: COMMERCIAL

## 2023-06-19 ENCOUNTER — APPOINTMENT (OUTPATIENT)
Dept: CARDIOLOGY | Facility: CLINIC | Age: 78
DRG: 270 | End: 2023-06-19
Payer: COMMERCIAL

## 2023-06-19 LAB
ALBUMIN SERPL BCG-MCNC: 3.1 G/DL (ref 3.5–5.2)
ALBUMIN SERPL BCG-MCNC: 3.4 G/DL (ref 3.5–5.2)
ALP SERPL-CCNC: 86 U/L (ref 40–129)
ALP SERPL-CCNC: 93 U/L (ref 40–129)
ALT SERPL W P-5'-P-CCNC: 53 U/L (ref 0–70)
ALT SERPL W P-5'-P-CCNC: 60 U/L (ref 0–70)
ANION GAP SERPL CALCULATED.3IONS-SCNC: 13 MMOL/L (ref 7–15)
ANION GAP SERPL CALCULATED.3IONS-SCNC: 15 MMOL/L (ref 7–15)
APTT PPP: 35 SECONDS (ref 22–38)
AST SERPL W P-5'-P-CCNC: 44 U/L (ref 0–45)
AST SERPL W P-5'-P-CCNC: 47 U/L (ref 0–45)
ATRIAL RATE - MUSE: 35 BPM
ATRIAL RATE - MUSE: NORMAL BPM
ATRIAL RATE - MUSE: NORMAL BPM
BILIRUB SERPL-MCNC: 0.9 MG/DL
BILIRUB SERPL-MCNC: 0.9 MG/DL
BUN SERPL-MCNC: 25.9 MG/DL (ref 8–23)
BUN SERPL-MCNC: 27.6 MG/DL (ref 8–23)
CALCIUM SERPL-MCNC: 9.2 MG/DL (ref 8.8–10.2)
CALCIUM SERPL-MCNC: 9.5 MG/DL (ref 8.8–10.2)
CHLORIDE SERPL-SCNC: 108 MMOL/L (ref 98–107)
CHLORIDE SERPL-SCNC: 109 MMOL/L (ref 98–107)
CREAT SERPL-MCNC: 1.1 MG/DL (ref 0.67–1.17)
CREAT SERPL-MCNC: 1.12 MG/DL (ref 0.67–1.17)
DEPRECATED HCO3 PLAS-SCNC: 17 MMOL/L (ref 22–29)
DEPRECATED HCO3 PLAS-SCNC: 17 MMOL/L (ref 22–29)
DIASTOLIC BLOOD PRESSURE - MUSE: NORMAL MMHG
ERYTHROCYTE [DISTWIDTH] IN BLOOD BY AUTOMATED COUNT: 15.6 % (ref 10–15)
GFR SERPL CREATININE-BSD FRML MDRD: 67 ML/MIN/1.73M2
GFR SERPL CREATININE-BSD FRML MDRD: 68 ML/MIN/1.73M2
GLUCOSE BLDC GLUCOMTR-MCNC: 129 MG/DL (ref 70–99)
GLUCOSE BLDC GLUCOMTR-MCNC: 135 MG/DL (ref 70–99)
GLUCOSE BLDC GLUCOMTR-MCNC: 137 MG/DL (ref 70–99)
GLUCOSE BLDC GLUCOMTR-MCNC: 151 MG/DL (ref 70–99)
GLUCOSE BLDC GLUCOMTR-MCNC: 168 MG/DL (ref 70–99)
GLUCOSE SERPL-MCNC: 128 MG/DL (ref 70–99)
GLUCOSE SERPL-MCNC: 157 MG/DL (ref 70–99)
HCT VFR BLD AUTO: 29.8 % (ref 40–53)
HGB BLD-MCNC: 9 G/DL (ref 13.3–17.7)
INTERPRETATION ECG - MUSE: NORMAL
LVEF ECHO: NORMAL
MAGNESIUM SERPL-MCNC: 2.1 MG/DL (ref 1.7–2.3)
MCH RBC QN AUTO: 28 PG (ref 26.5–33)
MCHC RBC AUTO-ENTMCNC: 30.2 G/DL (ref 31.5–36.5)
MCV RBC AUTO: 93 FL (ref 78–100)
P AXIS - MUSE: NORMAL DEGREES
PHOSPHATE SERPL-MCNC: 3 MG/DL (ref 2.5–4.5)
PLATELET # BLD AUTO: 162 10E3/UL (ref 150–450)
POTASSIUM SERPL-SCNC: 4.2 MMOL/L (ref 3.4–5.3)
POTASSIUM SERPL-SCNC: 4.2 MMOL/L (ref 3.4–5.3)
PR INTERVAL - MUSE: NORMAL MS
PROT SERPL-MCNC: 6.2 G/DL (ref 6.4–8.3)
PROT SERPL-MCNC: 6.7 G/DL (ref 6.4–8.3)
QRS DURATION - MUSE: 70 MS
QRS DURATION - MUSE: 76 MS
QRS DURATION - MUSE: 90 MS
QT - MUSE: 362 MS
QT - MUSE: 382 MS
QT - MUSE: 504 MS
QTC - MUSE: 372 MS
QTC - MUSE: 430 MS
QTC - MUSE: 440 MS
R AXIS - MUSE: -18 DEGREES
R AXIS - MUSE: 26 DEGREES
R AXIS - MUSE: 72 DEGREES
RBC # BLD AUTO: 3.22 10E6/UL (ref 4.4–5.9)
SCANNED LAB RESULT: NORMAL
SODIUM SERPL-SCNC: 138 MMOL/L (ref 136–145)
SODIUM SERPL-SCNC: 141 MMOL/L (ref 136–145)
SYSTOLIC BLOOD PRESSURE - MUSE: NORMAL MMHG
T AXIS - MUSE: 10 DEGREES
T AXIS - MUSE: 92 DEGREES
T AXIS - MUSE: 98 DEGREES
VENTRICULAR RATE- MUSE: 33 BPM
VENTRICULAR RATE- MUSE: 80 BPM
VENTRICULAR RATE- MUSE: 85 BPM
WBC # BLD AUTO: 8.3 10E3/UL (ref 4–11)

## 2023-06-19 PROCEDURE — 250N000013 HC RX MED GY IP 250 OP 250 PS 637

## 2023-06-19 PROCEDURE — 93306 TTE W/DOPPLER COMPLETE: CPT | Mod: 26 | Performed by: STUDENT IN AN ORGANIZED HEALTH CARE EDUCATION/TRAINING PROGRAM

## 2023-06-19 PROCEDURE — 84100 ASSAY OF PHOSPHORUS: CPT | Performed by: INTERNAL MEDICINE

## 2023-06-19 PROCEDURE — 83735 ASSAY OF MAGNESIUM: CPT | Performed by: INTERNAL MEDICINE

## 2023-06-19 PROCEDURE — 999N000208 ECHOCARDIOGRAM COMPLETE

## 2023-06-19 PROCEDURE — 120N000002 HC R&B MED SURG/OB UMMC

## 2023-06-19 PROCEDURE — 250N000011 HC RX IP 250 OP 636

## 2023-06-19 PROCEDURE — 85014 HEMATOCRIT: CPT

## 2023-06-19 PROCEDURE — 250N000013 HC RX MED GY IP 250 OP 250 PS 637: Performed by: STUDENT IN AN ORGANIZED HEALTH CARE EDUCATION/TRAINING PROGRAM

## 2023-06-19 PROCEDURE — 85730 THROMBOPLASTIN TIME PARTIAL: CPT

## 2023-06-19 PROCEDURE — 36415 COLL VENOUS BLD VENIPUNCTURE: CPT

## 2023-06-19 PROCEDURE — 97530 THERAPEUTIC ACTIVITIES: CPT | Mod: GO

## 2023-06-19 PROCEDURE — 250N000013 HC RX MED GY IP 250 OP 250 PS 637: Performed by: NURSE PRACTITIONER

## 2023-06-19 PROCEDURE — 84155 ASSAY OF PROTEIN SERUM: CPT

## 2023-06-19 PROCEDURE — 84450 TRANSFERASE (AST) (SGOT): CPT

## 2023-06-19 PROCEDURE — 80053 COMPREHEN METABOLIC PANEL: CPT

## 2023-06-19 PROCEDURE — 97535 SELF CARE MNGMENT TRAINING: CPT | Mod: GO

## 2023-06-19 PROCEDURE — 255N000002 HC RX 255 OP 636: Performed by: INTERNAL MEDICINE

## 2023-06-19 PROCEDURE — 99233 SBSQ HOSP IP/OBS HIGH 50: CPT | Mod: 25

## 2023-06-19 RX ORDER — FUROSEMIDE 10 MG/ML
60 INJECTION INTRAMUSCULAR; INTRAVENOUS ONCE
Status: COMPLETED | OUTPATIENT
Start: 2023-06-19 | End: 2023-06-19

## 2023-06-19 RX ORDER — LISINOPRIL 5 MG/1
5 TABLET ORAL DAILY
Status: DISCONTINUED | OUTPATIENT
Start: 2023-06-19 | End: 2023-06-21 | Stop reason: HOSPADM

## 2023-06-19 RX ADMIN — TICAGRELOR 90 MG: 90 TABLET ORAL at 07:46

## 2023-06-19 RX ADMIN — TICAGRELOR 90 MG: 90 TABLET ORAL at 20:09

## 2023-06-19 RX ADMIN — APIXABAN 5 MG: 5 TABLET, FILM COATED ORAL at 20:08

## 2023-06-19 RX ADMIN — FUROSEMIDE 60 MG: 10 INJECTION, SOLUTION INTRAVENOUS at 10:23

## 2023-06-19 RX ADMIN — PANTOPRAZOLE SODIUM 40 MG: 40 TABLET, DELAYED RELEASE ORAL at 07:46

## 2023-06-19 RX ADMIN — QUETIAPINE FUMARATE 50 MG: 50 TABLET ORAL at 20:08

## 2023-06-19 RX ADMIN — Medication 10 MG: at 22:32

## 2023-06-19 RX ADMIN — LISINOPRIL 5 MG: 5 TABLET ORAL at 13:46

## 2023-06-19 RX ADMIN — HUMAN ALBUMIN MICROSPHERES AND PERFLUTREN 6 ML: 10; .22 INJECTION, SOLUTION INTRAVENOUS at 14:43

## 2023-06-19 RX ADMIN — APIXABAN 10 MG: 5 TABLET, FILM COATED ORAL at 07:46

## 2023-06-19 RX ADMIN — ATORVASTATIN CALCIUM 80 MG: 80 TABLET, FILM COATED ORAL at 20:08

## 2023-06-19 ASSESSMENT — ACTIVITIES OF DAILY LIVING (ADL)
ADLS_ACUITY_SCORE: 30

## 2023-06-19 NOTE — PROGRESS NOTES
CLINICAL NUTRITION SERVICES - ASSESSMENT NOTE     Nutrition Prescription    RECOMMENDATIONS FOR MDs/PROVIDERS TO ORDER:  Encourage PO use of Ensure drinks if pt tolerates.    Malnutrition Status:    Severe malnutrition in the context of acute illness    Recommendations already ordered by Registered Dietitian (RD):  Supplements: Ensure Enlive - to be offered TID with meal    Future/Additional Recommendations:  -Monitor PO adequacy, wt trends  -Obtain nutrition hx, ONS preference as able     REASON FOR ASSESSMENT  Kimani Glover is a/an 77 year old male assessed by the dietitian for LOS    NUTRITION HISTORY  Not previously assessed by Clinical Nutrition.    No food allergies noted.    Attempted to meet with pt at bedside, but pt fast asleep upon visit and would not rouse to name x5 attempts. Deferred nutrition hx at this time.     CURRENT NUTRITION ORDERS  Diet: Regular  Intake/Tolerance: % meals per RN flowsheets. Not eating adequately per kcal cts below, but PO improved compared to earlier in admit.  Kcal cts:   6/15       Total Kcals: 335       Total Protein: 15g  Kcals from Hospital Food: 335                                   Protein: 15g  Kcals from Outside Food (average):0            Protein: 0g  # Meals Ordered from Kitchen: 3 meals  # Meals Recorded: 2 meals  (First - 100% yogurt, coffee, 75% fresh fruit cup)  (Second - 50% ice cream, mashed potatoes w/ gravy, 25% mac n cheese)  # Supplements Recorded: 0    6/17       Total Kcals: 725       Total Protein: 25g  Kcals from Hospital Food: 725                                   Protein: 25g  Kcals from Outside Food (average):0            Protein: 0g  # Meals Ordered from Kitchen: 2 ordered   # Meals Recorded: 2 recorded (First - 100% yogurt, 75% mixed fruit, 50% sausage lucretia, 25% breakfast sandwich with pork sausage, scrambled eggs, and cheese)                                                  (Second - 100% lemon fruit ice, 25% entree caesar salad)   #  "Supplements Recorded: no intake recorded      LABS  Labs reviewed  Electrolytes  Potassium (mmol/L)   Date Value   06/19/2023 4.2   06/18/2023 4.2   06/18/2023 3.9   09/03/2022 4.3   09/02/2022 4.3   08/14/2020 4.1     Phosphorus (mg/dL)   Date Value   06/19/2023 3.0   06/18/2023 2.8   06/17/2023 2.7   06/16/2023 2.0 (L)   06/15/2023 3.4    Blood Glucose  Glucose (mg/dL)   Date Value   06/19/2023 128 (H)   06/18/2023 151 (H)   06/18/2023 140 (H)   09/03/2022 128 (H)   09/02/2022 154 (H)   08/14/2020 133 (H)   08/13/2020 155 (H)     GLUCOSE BY METER POCT (mg/dL)   Date Value   06/18/2023 117 (H)   06/18/2023 133 (H)   06/18/2023 211 (H)   06/18/2023 134 (H)   06/17/2023 163 (H)     Hemoglobin A1C (%)   Date Value   06/12/2023 5.6   08/14/2020 5.3    Inflammatory Markers  WBC Count (10e3/uL)   Date Value   06/19/2023 8.3   06/18/2023 10.0   06/17/2023 6.5     Albumin (g/dL)   Date Value   06/19/2023 3.1 (L)   06/18/2023 3.3 (L)   06/18/2023 3.2 (L)   09/03/2022 3.1 (L)   09/02/2022 3.9      Magnesium (mg/dL)   Date Value   06/19/2023 2.1   06/18/2023 2.2   06/17/2023 2.0     Sodium (mmol/L)   Date Value   06/19/2023 138   06/18/2023 137   06/18/2023 138    Renal  Urea Nitrogen (mg/dL)   Date Value   06/19/2023 27.6 (H)   06/18/2023 28.5 (H)   06/18/2023 27.9 (H)   09/03/2022 11   09/02/2022 13   08/14/2020 17     Creatinine (mg/dL)   Date Value   06/19/2023 1.12   06/18/2023 1.30 (H)   06/18/2023 1.34 (H)     Additional  Ketones Urine (mg/dL)   Date Value   09/02/2022 Negative        MEDICATIONS  Medications reviewed  -Lasix  -Medium dose sliding scale insulin  -KCl    ANTHROPOMETRICS  Height:   Ht Readings from Last 1 Encounters:   06/11/23 1.753 m (5' 9\")   Most Recent Weight: 99.2 kg (218 lb 11.1 oz)    IBW: 72.7 kg   BMI: 32.30 kg/m2; Obesity Grade I BMI 30-34.9  Weight History: ~10.7% wt loss over past ~2.5 months.   Wt Readings from Last 20 Encounters:   06/19/23 99.2 kg (218 lb 11.1 oz)   06/11/23 104.3 kg (230 " lb)   09/02/22 111.9 kg (246 lb 9.6 oz)     Per Care Everywhere:  110.9 kg (244 lb 9.6 oz) 04/04/2023 2:06 PM CDT     109.9 kg (242 lb 3.2 oz) 09/08/2022 2:10 PM CDT     Wts this admit  06/19/23 0400 99.2 kg (218 lb 11.1 oz) --   06/18/23 0200 96.9 kg (213 lb 10 oz) Bed scale   06/14/23 0400 -- Bed scale   06/13/23 0000 102 kg (224 lb 13.9 oz) Bed scale     Dosing Weight: 79 kg (adjusted, based on lowest wt of 96.9 kg on 6/18 and IBW of 72.7 kg)    ASSESSED NUTRITION NEEDS  Estimated Energy Needs: 7171-7874 kcals/day (25 - 30 kcals/kg)  Justification: Maintenance  Estimated Protein Needs:  grams protein/day (1.2 - 1.5 grams of pro/kg)  Justification: Hypercatabolism with critical illness  Estimated Fluid Needs: 1 mL/kcal   Justification: Maintenance or Per provider pending fluid status    PHYSICAL FINDINGS  See malnutrition section below.    MALNUTRITION  % Intake: </= 50% for >/= 5 days (severe)  % Weight Loss: > 7.5% in 3 months (severe) - suspect at least partially confounded by fluid  Subcutaneous Fat Loss: Upper arm:  Mild and Lower arm:  Mild per visual  Muscle Loss: Unable to assess - nothing profound noted per visual  Fluid Accumulation/Edema: None noted per chart  Malnutrition Diagnosis: Severe malnutrition in the context of acute illness    NUTRITION DIAGNOSIS  Inadequate oral intake related to supsected decreased appetite in setting of critical illness as evidenced by kcal cts showing suboptimal PO (<50% assessed kcal needs).      INTERVENTIONS  Implementation  -Nutrition Education: Unable to complete due to pt asleep.   -Medical food supplement therapy: Room service to offer Ensure with meals.     Goals  Patient to consume % of nutritionally adequate meal trays TID, or the equivalent with supplements/snacks.     Monitoring/Evaluation  Progress toward goals will be monitored and evaluated per protocol.    Betsey Rodriguez RD, LD  Pager: [phone]

## 2023-06-19 NOTE — PROGRESS NOTES
Cardiology ICU Progress Note    Brief HPI:Kimani Glover is a 77 year old male who was admitted on 6/11/2023  following STEMI and PCI. Patient presented to [facility] ED after near syncope. Patient was reportedly with his family when he fell and almost lost consciousness. EMS was called and he was found to be lethargic with SBP 58 mmHg and bradycardic HR 30s. He was brought to [place] ED where he was found to be bradycardic 32 bpm and hypotensive. EKG showed inferior STEMI. He was started on dopamine and taken to the cath lab for emergent angiography.      Coronary angiogram showed occlusion of dominant RCA. PCI was attempted but was complicated by dissection and no-reflow. Temporary pace maker was placed. He was transferred to [facility] CICU for further management.      On arrival patient hemodynamically stable on 12 dopamine and 0.01 epinephrine. Patient comfortable without chest pain, shortness of breath, lightheadedness, dizziness, fevers, chills.     Interval changes:   No acute events over the night. Slept well.DOAC for afib. Transfer to floor when bed available    Today's Plan:  -Transfer to floor  - Low dose Oxy for pain  - FBG (-) 1  - lasix given once  - Continue Zosyn for 7 D course  - DOAC for afib  - Lisinopril for GDMT to start today    ASSESSMENT AND PLAN BY SYSTEM:   Neurology   # Acute delirium  # Acute pain  CAM (+), requring medications for management of agitation, improving today. 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  Global right ventricular function is moderately reduced.The inferior vena cava is normal.  No pericardial effusion is  present.  Angio 6/11: dRCA  70% stenosed, RPDA 50% stenosed.The RCA is a dominant artery withTIMI2 flow. It is stented from the ostium to the genu inferius, the stents are patent.   The RCA has a 70% lesion in its distal segment, that does not require emergency         treatment.          Plan:  - doac for a-fib  - FBG (-) 1 L, 60 mg lasix   CAD:  - Continue  ticagrelor 90mg BID for CAD  GDMT:  - Hold lipitor for now given shock liver  - Hold ACE/ARB Lisinopril 5 mg QD  - Holding beta blocker given shock  - Hold MRA due to likely reduced renal fx after arrest  - Hold SGLT2i due to likely reduced renal fx after arrest    Pulmonary  # Pulmonary edema  # THONG  RA      Plan:  - Diuresis as above  - Daily CXR   - Home CPAP at night  - Pulm toileting  - Consider scheduled duonebs if signs of lung dz, currently PRN      Gastrointestinal, Nutrition  #Hypoalbuminemia  -Nutrition: Regular diet  -Last BM: 6/17    Plan:  - Bowel regimen as needed  - Regular diet  - Calorie count      Liver Function Studies   Recent Labs   Lab 06/19/23  0443 06/18/23  1540 06/18/23  0319   PROTTOTAL 6.2* 6.7 6.0*   ALBUMIN 3.1* 3.3* 3.2*   BILITOTAL 0.9 0.9 0.9   ALKPHOS 86 107 82   AST 44 61* 53*   ALT 53 57 52       Renal, Electrolytes  # Acute Renal Injury  # Hyponatremia  -Baseline CR 0.80    Intake/Output Summary (Last 24 hours) at 6/18/2023 0754  Last data filed at 6/18/2023 0700  Gross per 24 hour   Intake 1458.2 ml   Output 3145 ml   Net -1686.8 ml           Renal Labs  Recent Labs   Lab 06/19/23  0443 06/18/23  1540 06/18/23  0319 06/17/23  1604 06/17/23  0349    137 138   < > 138   POTASSIUM 4.2 4.2 3.9   < > 3.7   CHLORIDE 108* 107 109*   < > 106   CO2 17* 15* 17*   < > 20*   BUN 27.6* 28.5* 27.9*   < > 34.0*   CR 1.12 1.30* 1.34*   < > 1.43*   PHOS 3.0  --  2.8  --  2.7   MAG 2.1  --  2.2  --  2.0    < > = values in this interval not displayed.     Plan:  - Monitor urine output  - Avoid nephrotoxins, renally dose meds  -  Diuresis and FBG as above    Infectious Disease  # Aspiration Pneumonia  # Leukocytosis  Recent Labs   Lab 23  0443 23  0319 23  0349   WBC 8.3 10.0 6.5     Temp (24hrs), Av.7  F (37.1  C), Min:98.4  F (36.9  C), Max:98.9  F (37.2  C)    Cultures thus far:    BCx NGTD; UCx NGTD; MRSA (-)    COVID Negative  Antibiotics              Zosyn -present  Plan:  - Continue Zosyn for 7D course  - Monitor for signs of infection    Hematology  # Anemia of critical illness  Admission Hgb 13.4, . MARIA (-)     Hematology Labs  Recent Labs   Lab 23  0443 23  0319 23  0349   HGB 9.0* 8.3* 7.9*   HCT 29.8* 26.6* 24.7*    122* 79*     Plan:  - ASA/ticagrelor for PAULINE  - DVT PPX: DOAC  - No acute indication for transfusion    Endocrinology  # Hyperglycemia   # Hgb A1c 5.6  No known medical history    Recent Labs   Lab 23  0748 23  0443 23  2120 23  1655 23  1540   * 128* 117* 133* 151*     Plan:  -SSI as needed    Integumentary:  - No skin issues    Plan  -early mobility protocol    Lines/Tubes/Drains:  PIV  Current lines are required for patient management       Family updated by me    Patient seen, discussed, and plan reviewed with Dr. Giovanny Mendoza DNP, APRN, CNP  [facility]  [phone]      Physical exam:  General: In bed, restless, body hurts from activity restrictions  HEENT: PERRL, no scleral icterus or injection  CARDIAC: IRR HR, no m/r/g appreciated. Distal peripheral pulses dopplered  RESP: RA; CTAB, no wheezes, rhonchi or crackles appreciated.  GI: soft, BS hypoactive. No BM  : [place]  EXTREMITIES: No LE edema, pulses 2+. Femoral access site w/o bleeding, dressing c/d/i. No bruits appreciated.   SKIN: No acute lesions appreciated  NEURO: alert, oriented x 3, occasional confusion    BP 95/51   Pulse 86   Temp 99.8  F (37.7  C) (Oral)   Resp 23   Wt 99.2 kg (218 lb 11.1 oz)   SpO2 100%   BMI 32.30  kg/m    Temp:  [97.7  F (36.5  C)-99.8  F (37.7  C)] 99.8  F (37.7  C)  Pulse:  [84-92] 86  Resp:  [17-23] 23  BP: ()/(51-80) 95/51  SpO2:  [95 %-100 %] 100 %    Labs:  Recent Labs   Lab 06/19/23 0443 06/18/23  1540 06/18/23 0319 06/17/23  1604 06/17/23  0349    137 138   < > 138   POTASSIUM 4.2 4.2 3.9   < > 3.7   CHLORIDE 108* 107 109*   < > 106   CO2 17* 15* 17*   < > 20*   ANIONGAP 13 15 12   < > 12   BUN 27.6* 28.5* 27.9*   < > 34.0*   CR 1.12 1.30* 1.34*   < > 1.43*   GFRESTIMATED 67 56* 54*   < > 50*   SUSAN 9.2 9.0 8.5*   < > 8.5*   MAG 2.1  --  2.2  --  2.0   PHOS 3.0  --  2.8  --  2.7    < > = values in this interval not displayed.     Recent Labs   Lab 06/19/23 0443 06/18/23  1540 06/18/23 0319   PROTTOTAL 6.2* 6.7 6.0*   ALBUMIN 3.1* 3.3* 3.2*   BILITOTAL 0.9 0.9 0.9   ALKPHOS 86 107 82   AST 44 61* 53*   ALT 53 57 52        Recent Labs   Lab 06/19/23 0443 06/18/23 0319 06/17/23 0349   WBC 8.3 10.0 6.5   RBC 3.22* 3.01* 2.81*   HGB 9.0* 8.3* 7.9*   HCT 29.8* 26.6* 24.7*   MCV 93 88 88   MCH 28.0 27.6 28.1   MCHC 30.2* 31.2* 32.0   RDW 15.6* 15.3* 15.1*    122* 79*     No lab results found in last 7 days.  No lab results found in last 7 days.  Arterial Blood Gas   Recent Labs   Lab 06/17/23  1305 06/17/23  0918 06/17/23  0349 06/14/23 2003 06/14/23 2001   PH  --   --   --   --  7.45   PCO2  --   --   --   --  33*   PO2  --   --   --   --  79*   HCO3  --   --   --   --  23   O2PER 21 21 21   < > 36    < > = values in this interval not displayed.         Imaging/procedure results:  XR Chest Port 1 View  Narrative: EXAM: XR CHEST PORT 1 VIEW  6/17/2023 12:40 AM     HISTORY:  daily IABP check       COMPARISON:  Radiographs 6/16/2023, 6/15/2023.    TECHNIQUE: AP view the chest at 60 degrees    FINDINGS:   Devices, lines, tubes: Interval removal of intra-aortic balloon pump.  Right internal jugular central venous catheter tip projecting over the  low SVC.    The trachea is midline. The  cardiomediastinal silhouette is within  normal limits. Increased left basilar opacities.  Stable bilateral  perihilar opacities. No appreciable pneumothorax. No acute osseous  abnormality.    Impression: IMPRESSION:   1. Interval removal of intra-aortic balloon pump.  2. Increased left basilar opacities, may represent atelectasis or  aspiration.  3. Stable bilateral perihilar atelectasis and/or edema.    I have personally reviewed the examination and initial interpretation  and I agree with the findings.    GIGI ANN MD         SYSTEM ID:  [id number]

## 2023-06-19 NOTE — PLAN OF CARE
ICU End of Shift Summary. See flowsheets for vital signs and detailed assessment.    Changes this shift: VSS on RA, A&OX4. No pain. Voided x1, stool x1. Labs were unremarkable.     Plan: Waiting for bed to become available upstairs. Follow POC.      Problem: Risk for Delirium  Goal: Improved Behavioral Control  Intervention: Minimize Safety Risk  Recent Flowsheet Documentation  Taken 6/19/2023 0400 by Winifred Blancas, RN  Enhanced Safety Measures: room near unit station  Taken 6/19/2023 0000 by Winifred Blancas, RN  Enhanced Safety Measures: room near unit station  Taken 6/18/2023 2000 by Winifred Blancas, RN  Enhanced Safety Measures: room near unit station   Goal Outcome Evaluation:

## 2023-06-19 NOTE — PROGRESS NOTES
ICU End of Shift Summary. See flowsheets for vital signs and detailed assessment.    Changes this shift: No acute events. VSS on RA. Food encouraged, poor appetite. Up in chair and ambulating with assistance in room. No BM, voiding without difficulty.    Plan: transfer to floor

## 2023-06-20 ENCOUNTER — APPOINTMENT (OUTPATIENT)
Dept: OCCUPATIONAL THERAPY | Facility: CLINIC | Age: 78
DRG: 270 | End: 2023-06-20
Attending: INTERNAL MEDICINE
Payer: COMMERCIAL

## 2023-06-20 ENCOUNTER — APPOINTMENT (OUTPATIENT)
Dept: PHYSICAL THERAPY | Facility: CLINIC | Age: 78
DRG: 270 | End: 2023-06-20
Attending: INTERNAL MEDICINE
Payer: COMMERCIAL

## 2023-06-20 LAB
ALBUMIN SERPL BCG-MCNC: 3.1 G/DL (ref 3.5–5.2)
ALBUMIN SERPL BCG-MCNC: 3.4 G/DL (ref 3.5–5.2)
ALP SERPL-CCNC: 82 U/L (ref 40–129)
ALP SERPL-CCNC: 88 U/L (ref 40–129)
ALT SERPL W P-5'-P-CCNC: 55 U/L (ref 0–70)
ALT SERPL W P-5'-P-CCNC: 72 U/L (ref 0–70)
ANION GAP SERPL CALCULATED.3IONS-SCNC: 10 MMOL/L (ref 7–15)
ANION GAP SERPL CALCULATED.3IONS-SCNC: 16 MMOL/L (ref 7–15)
APTT PPP: 37 SECONDS (ref 22–38)
AST SERPL W P-5'-P-CCNC: 46 U/L (ref 0–45)
AST SERPL W P-5'-P-CCNC: 65 U/L (ref 0–45)
ATRIAL RATE - MUSE: 83 BPM
BILIRUB SERPL-MCNC: 1 MG/DL
BILIRUB SERPL-MCNC: 1 MG/DL
BUN SERPL-MCNC: 23.6 MG/DL (ref 8–23)
BUN SERPL-MCNC: 27.1 MG/DL (ref 8–23)
CALCIUM SERPL-MCNC: 9 MG/DL (ref 8.8–10.2)
CALCIUM SERPL-MCNC: 9.5 MG/DL (ref 8.8–10.2)
CHLORIDE SERPL-SCNC: 105 MMOL/L (ref 98–107)
CHLORIDE SERPL-SCNC: 107 MMOL/L (ref 98–107)
CREAT SERPL-MCNC: 1.06 MG/DL (ref 0.67–1.17)
CREAT SERPL-MCNC: 1.1 MG/DL (ref 0.67–1.17)
DEPRECATED HCO3 PLAS-SCNC: 15 MMOL/L (ref 22–29)
DEPRECATED HCO3 PLAS-SCNC: 18 MMOL/L (ref 22–29)
DIASTOLIC BLOOD PRESSURE - MUSE: NORMAL MMHG
ERYTHROCYTE [DISTWIDTH] IN BLOOD BY AUTOMATED COUNT: 15.3 % (ref 10–15)
GFR SERPL CREATININE-BSD FRML MDRD: 68 ML/MIN/1.73M2
GFR SERPL CREATININE-BSD FRML MDRD: 71 ML/MIN/1.73M2
GLUCOSE BLDC GLUCOMTR-MCNC: 139 MG/DL (ref 70–99)
GLUCOSE BLDC GLUCOMTR-MCNC: 153 MG/DL (ref 70–99)
GLUCOSE BLDC GLUCOMTR-MCNC: 175 MG/DL (ref 70–99)
GLUCOSE BLDC GLUCOMTR-MCNC: 190 MG/DL (ref 70–99)
GLUCOSE SERPL-MCNC: 139 MG/DL (ref 70–99)
GLUCOSE SERPL-MCNC: 173 MG/DL (ref 70–99)
HCT VFR BLD AUTO: 28.9 % (ref 40–53)
HGB BLD-MCNC: 9.2 G/DL (ref 13.3–17.7)
INTERPRETATION ECG - MUSE: NORMAL
MAGNESIUM SERPL-MCNC: 2.2 MG/DL (ref 1.7–2.3)
MCH RBC QN AUTO: 28.7 PG (ref 26.5–33)
MCHC RBC AUTO-ENTMCNC: 31.8 G/DL (ref 31.5–36.5)
MCV RBC AUTO: 90 FL (ref 78–100)
P AXIS - MUSE: NORMAL DEGREES
PHOSPHATE SERPL-MCNC: 3 MG/DL (ref 2.5–4.5)
PLATELET # BLD AUTO: 202 10E3/UL (ref 150–450)
POTASSIUM SERPL-SCNC: 3.7 MMOL/L (ref 3.4–5.3)
POTASSIUM SERPL-SCNC: 4 MMOL/L (ref 3.4–5.3)
PR INTERVAL - MUSE: 376 MS
PROT SERPL-MCNC: 6.2 G/DL (ref 6.4–8.3)
PROT SERPL-MCNC: 7 G/DL (ref 6.4–8.3)
QRS DURATION - MUSE: 92 MS
QT - MUSE: 324 MS
QTC - MUSE: 356 MS
R AXIS - MUSE: -23 DEGREES
RBC # BLD AUTO: 3.21 10E6/UL (ref 4.4–5.9)
SODIUM SERPL-SCNC: 135 MMOL/L (ref 136–145)
SODIUM SERPL-SCNC: 136 MMOL/L (ref 136–145)
SYSTOLIC BLOOD PRESSURE - MUSE: NORMAL MMHG
T AXIS - MUSE: 104 DEGREES
VENTRICULAR RATE- MUSE: 73 BPM
WBC # BLD AUTO: 8.4 10E3/UL (ref 4–11)

## 2023-06-20 PROCEDURE — 80053 COMPREHEN METABOLIC PANEL: CPT

## 2023-06-20 PROCEDURE — 84450 TRANSFERASE (AST) (SGOT): CPT

## 2023-06-20 PROCEDURE — 250N000013 HC RX MED GY IP 250 OP 250 PS 637

## 2023-06-20 PROCEDURE — 120N000002 HC R&B MED SURG/OB UMMC

## 2023-06-20 PROCEDURE — 250N000013 HC RX MED GY IP 250 OP 250 PS 637: Performed by: INTERNAL MEDICINE

## 2023-06-20 PROCEDURE — 99233 SBSQ HOSP IP/OBS HIGH 50: CPT | Mod: FS | Performed by: STUDENT IN AN ORGANIZED HEALTH CARE EDUCATION/TRAINING PROGRAM

## 2023-06-20 PROCEDURE — 250N000011 HC RX IP 250 OP 636: Performed by: STUDENT IN AN ORGANIZED HEALTH CARE EDUCATION/TRAINING PROGRAM

## 2023-06-20 PROCEDURE — 84155 ASSAY OF PROTEIN SERUM: CPT

## 2023-06-20 PROCEDURE — 250N000013 HC RX MED GY IP 250 OP 250 PS 637: Performed by: NURSE PRACTITIONER

## 2023-06-20 PROCEDURE — 36415 COLL VENOUS BLD VENIPUNCTURE: CPT

## 2023-06-20 PROCEDURE — 84100 ASSAY OF PHOSPHORUS: CPT | Performed by: INTERNAL MEDICINE

## 2023-06-20 PROCEDURE — 250N000013 HC RX MED GY IP 250 OP 250 PS 637: Performed by: STUDENT IN AN ORGANIZED HEALTH CARE EDUCATION/TRAINING PROGRAM

## 2023-06-20 PROCEDURE — 97530 THERAPEUTIC ACTIVITIES: CPT | Mod: GO

## 2023-06-20 PROCEDURE — 93010 ELECTROCARDIOGRAM REPORT: CPT | Performed by: INTERNAL MEDICINE

## 2023-06-20 PROCEDURE — 97116 GAIT TRAINING THERAPY: CPT | Mod: GP

## 2023-06-20 PROCEDURE — 85027 COMPLETE CBC AUTOMATED: CPT

## 2023-06-20 PROCEDURE — 97530 THERAPEUTIC ACTIVITIES: CPT | Mod: GP

## 2023-06-20 PROCEDURE — 97535 SELF CARE MNGMENT TRAINING: CPT | Mod: GO

## 2023-06-20 PROCEDURE — 83735 ASSAY OF MAGNESIUM: CPT | Performed by: INTERNAL MEDICINE

## 2023-06-20 PROCEDURE — 85730 THROMBOPLASTIN TIME PARTIAL: CPT

## 2023-06-20 PROCEDURE — 93005 ELECTROCARDIOGRAM TRACING: CPT

## 2023-06-20 RX ORDER — METOPROLOL SUCCINATE 25 MG/1
25 TABLET, EXTENDED RELEASE ORAL DAILY
Status: DISCONTINUED | OUTPATIENT
Start: 2023-06-20 | End: 2023-06-21 | Stop reason: HOSPADM

## 2023-06-20 RX ORDER — FUROSEMIDE 10 MG/ML
60 INJECTION INTRAMUSCULAR; INTRAVENOUS ONCE
Status: COMPLETED | OUTPATIENT
Start: 2023-06-20 | End: 2023-06-20

## 2023-06-20 RX ORDER — POTASSIUM CHLORIDE 750 MG/1
20 TABLET, EXTENDED RELEASE ORAL ONCE
Status: COMPLETED | OUTPATIENT
Start: 2023-06-20 | End: 2023-06-20

## 2023-06-20 RX ORDER — QUETIAPINE FUMARATE 25 MG/1
25 TABLET, FILM COATED ORAL AT BEDTIME
Status: DISCONTINUED | OUTPATIENT
Start: 2023-06-20 | End: 2023-06-21 | Stop reason: HOSPADM

## 2023-06-20 RX ADMIN — APIXABAN 5 MG: 5 TABLET, FILM COATED ORAL at 09:17

## 2023-06-20 RX ADMIN — TICAGRELOR 90 MG: 90 TABLET ORAL at 09:17

## 2023-06-20 RX ADMIN — QUETIAPINE FUMARATE 25 MG: 25 TABLET ORAL at 19:45

## 2023-06-20 RX ADMIN — LISINOPRIL 5 MG: 5 TABLET ORAL at 09:18

## 2023-06-20 RX ADMIN — Medication 10 MG: at 19:46

## 2023-06-20 RX ADMIN — APIXABAN 5 MG: 5 TABLET, FILM COATED ORAL at 19:46

## 2023-06-20 RX ADMIN — METOPROLOL SUCCINATE 25 MG: 25 TABLET, EXTENDED RELEASE ORAL at 13:49

## 2023-06-20 RX ADMIN — ATORVASTATIN CALCIUM 80 MG: 80 TABLET, FILM COATED ORAL at 19:46

## 2023-06-20 RX ADMIN — TICAGRELOR 90 MG: 90 TABLET ORAL at 19:46

## 2023-06-20 RX ADMIN — FUROSEMIDE 60 MG: 10 INJECTION, SOLUTION INTRAVENOUS at 09:17

## 2023-06-20 RX ADMIN — POTASSIUM CHLORIDE 20 MEQ: 750 TABLET, EXTENDED RELEASE ORAL at 19:46

## 2023-06-20 ASSESSMENT — ACTIVITIES OF DAILY LIVING (ADL)
ADLS_ACUITY_SCORE: 30

## 2023-06-20 NOTE — PROGRESS NOTES
ICU End of Shift Summary. See flowsheets for vital signs and detailed assessment.    Changes this shift: No acute changes, VSS. Up in chair and ambulating in [place]. 1 BM, voiding without difficulty. Drinking ensure    Plan: discharge home tomorrow

## 2023-06-20 NOTE — PROGRESS NOTES
Cardiology ICU Progress Note    Brief HPI:Kimani Glover is a 77 year old male who was admitted on 6/11/2023  following STEMI and PCI. Patient presented to [facility] ED after near syncope. Patient was reportedly with his family when he fell and almost lost consciousness. EMS was called and he was found to be lethargic with SBP 58 mmHg and bradycardic HR 30s. He was brought to [place] ED where he was found to be bradycardic 32 bpm and hypotensive. EKG showed inferior STEMI. He was started on dopamine and taken to the cath lab for emergent angiography.      Coronary angiogram showed occlusion of dominant RCA. PCI was attempted but was complicated by dissection and no-reflow. Temporary pace maker was placed. He was transferred to [facility] CICU for further management.      On arrival patient hemodynamically stable on 12 dopamine and 0.01 epinephrine. Patient comfortable without chest pain, shortness of breath, lightheadedness, dizziness, fevers, chills.     Interval changes:   No acute events over the night    Today's Plan:  - Transfer to floor  - Metoprolol 25  - Decrease Seroquel overnight  - Repeat lasix  - Plan to discharge tomorrow if not transferred    ASSESSMENT AND PLAN BY SYSTEM:   Neurology   # Acute delirium  # Acute pain  CAM (+), requring medications for management of agitation, improving today. Pain with positioning in bed    Plan:  - Seroquel 25 HS  - Encourage day night cycles  - Low dose prn oxy for pain management    Cardiovascular  # Inferior STEMI  # RCA dissection with stenting of proximal to mid RCA  #Non-culprit 80% D1 lesion  # No reflow (DEMARCUS 0)  # IABP in place  # Dyslipidemia  # A-fib  TTE 6/12: Global and regional left ventricular function is normal with an EF of 60-65%.  Global right ventricular function is moderately reduced.The inferior vena cava is normal.  No pericardial effusion is present.  Angio 6/11: dRCA  70% stenosed, RPDA 50% stenosed.The RCA is a dominant artery withTIMI2 flow.  It is stented from the ostium to the genu inferius, the stents are patent.   The RCA has a 70% lesion in its distal segment, that does not require emergency         treatment.     Plan:  - doac for a-fib  - FBG (-) 1 L, 60 mg lasix   CAD:  - Continue ticagrelor 90mg BID for CAD  GDMT:  - Hold lipitor for now given shock liver  - Lisinopril 5 mg QD  - Toprol 25 daily  - Hold MRA due to likely reduced renal fx after arrest  - Hold SGLT2i due to likely reduced renal fx after arrest    Pulmonary  # THONG  RA    Plan:  - Diuresis as above  - Daily CXR   - Home CPAP at night  - Pulm toileting  - Consider scheduled duonebs if signs of lung dz, currently PRN      Gastrointestinal, Nutrition  #Hypoalbuminemia  -Nutrition: Regular diet  -Last BM:     Plan:  - Bowel regimen as needed  - Regular diet    Liver Function Studies   Recent Labs   Lab 23  0442 23  1559 23  0443   PROTTOTAL 6.2* 6.7 6.2*   ALBUMIN 3.1* 3.4* 3.1*   BILITOTAL 1.0 0.9 0.9   ALKPHOS 82 93 86   AST 46* 47* 44   ALT 55 60 53       Renal, Electrolytes  # Acute Renal Injury  # Hyponatremia  -Baseline CR 0.80    Intake/Output Summary (Last 24 hours) at 2023 0726  Last data filed at 2023 2300  Gross per 24 hour   Intake 903 ml   Output 1550 ml   Net -647 ml     Renal Labs  Recent Labs   Lab 23  0442 23  1559 23  0443 23  1540 23  0319   * 141 138   < > 138   POTASSIUM 4.0 4.2 4.2   < > 3.9   CHLORIDE 107 109* 108*   < > 109*   CO2 18* 17* 17*   < > 17*   BUN 23.6* 25.9* 27.6*   < > 27.9*   CR 1.06 1.10 1.12   < > 1.34*   PHOS 3.0  --  3.0  --  2.8   MAG 2.2  --  2.1  --  2.2    < > = values in this interval not displayed.     Plan:  - Monitor urine output  - Avoid nephrotoxins, renally dose meds  - Diuresis and FBG as above    Infectious Disease  # Aspiration Pneumonia- resolved  Recent Labs   Lab 23  0442 23  0443 23  0319   WBC 8.4 8.3 10.0     Temp (24hrs), Av.7  F (37.1   C), Min:98.4  F (36.9  C), Max:98.9  F (37.2  C)    Cultures thus far:   6/12 BCx NGTD; UCx NGTD; MRSA (-)   6/11 COVID Negative  Antibiotics              Zosyn 6/12-6/18  Plan:  - Monitor for signs of infection    Hematology  # Anemia of critical illness  Admission Hgb 13.4, . MARIA (-) 6/14    Hematology Labs  Recent Labs   Lab 06/20/23  0442 06/19/23  0443 06/18/23  0319   HGB 9.2* 9.0* 8.3*   HCT 28.9* 29.8* 26.6*    162 122*     Plan:  - ASA/ticagrelor for PAULINE  - DVT PPX: DOAC  - No acute indication for transfusion    Endocrinology  # Hyperglycemia   # Hgb A1c 5.6  No known medical history    Recent Labs   Lab 06/20/23 0442 06/19/23  2233 06/19/23 2015 06/19/23  1614 06/19/23  1559   * 135* 168* 151* 157*     Plan:  -SSI as needed    Integumentary:  - No skin issues    Plan  -early mobility protocol    Lines/Tubes/Drains:  PIV  Current lines are required for patient management       Family updated by me    Patient seen, discussed, and plan reviewed with MARIAH Rodrigues CNP  [facility]  ICU Cardiology-CICU Service    Physical exam:  General: In bed, restless, body hurts from activity restrictions  HEENT: PERRL, no scleral icterus or injection  CARDIAC: IRR HR, no m/r/g appreciated. Distal peripheral pulses dopplered  RESP: RA; CTAB, no wheezes, rhonchi or crackles appreciated.  GI: soft, BS hypoactive. No BM  : Mckenzie  EXTREMITIES: No LE edema, pulses 2+. Femoral access site w/o bleeding, dressing c/d/i. No bruits appreciated.   SKIN: No acute lesions appreciated  NEURO: alert, oriented x 3, occasional confusion    BP 94/57   Pulse 89   Temp 98.4  F (36.9  C) (Oral)   Resp 22   Wt 99.2 kg (218 lb 11.1 oz)   SpO2 100%   BMI 32.30 kg/m    Temp:  [97.8  F (36.6  C)-99.8  F (37.7  C)] 98.4  F (36.9  C)  Pulse:  [67-92] 89  Resp:  [10-27] 22  BP: ()/(57-70) 94/57  SpO2:  [95 %-100 %] 100 %    Labs:  Recent Labs   Lab 06/20/23  0442 06/19/23  1558  23  1540 239   * 141 138   < > 138   POTASSIUM 4.0 4.2 4.2   < > 3.9   CHLORIDE 107 109* 108*   < > 109*   CO2 18* 17* 17*   < > 17*   ANIONGAP 10 15 13   < > 12   BUN 23.6* 25.9* 27.6*   < > 27.9*   CR 1.06 1.10 1.12   < > 1.34*   GFRESTIMATED 71 68 67   < > 54*   SUSAN 9.0 9.5 9.2   < > 8.5*   MAG 2.2  --  2.1  --  2.2   PHOS 3.0  --  3.0  --  2.8    < > = values in this interval not displayed.     Recent Labs   Lab 23  1559 23   PROTTOTAL 6.2* 6.7 6.2*   ALBUMIN 3.1* 3.4* 3.1*   BILITOTAL 1.0 0.9 0.9   ALKPHOS 82 93 86   AST 46* 47* 44   ALT 55 60 53        Recent Labs   Lab 23   WBC 8.4 8.3 10.0   RBC 3.21* 3.22* 3.01*   HGB 9.2* 9.0* 8.3*   HCT 28.9* 29.8* 26.6*   MCV 90 93 88   MCH 28.7 28.0 27.6   MCHC 31.8 30.2* 31.2*   RDW 15.3* 15.6* 15.3*    162 122*     No lab results found in last 7 days.  No lab results found in last 7 days.  Arterial Blood Gas   Recent Labs   Lab 23  1305 23  0918 23  0349 23   PH  --   --   --   --  7.45   PCO2  --   --   --   --  33*   PO2  --   --   --   --  79*   HCO3  --   --   --   --  23   O2PER 21 21 21   < > 36    < > = values in this interval not displayed.         Imaging/procedure results:  Echocardiogram Complete  [id number]  [id number]  [id number]  [id number]^NORA^CHRIS^EVA     [facility],[place]  Echocardiography Laboratory  [address] [place] [id number]     Name: CUATE LEE  MRN: [id number]  : 1945  Study Date: 2023 01:52 PM  Age: 77 yrs  Gender: Male  [place] [facility] [place]  Reason For Study: MI  Ordering Physician: CHRIS MELENDEZ  Referring Physician: ALLEN BAUMAN  Performed By: Emerald Mayfield     BSA: 2.1 m2  Height: 69 in  Weight: 218 lb  ______________________________________________________________________________  Procedure  Complete  Portable Echo Adult. Contrast Optison. Optison (NDC #8346-1106-29)  given intravenously. Patient was given 6 ml mixture of 3 ml Optison and 6 ml  saline. 3 ml wasted.  ______________________________________________________________________________  Interpretation Summary  Left ventricular function is decreased. The ejection fraction is 50-55%  (borderline). There is moderate hypokinesis of the basal inferior and mid  inferior segments.  Global right ventricular function is mildly reduced. Mild right ventricular  dilation is present.  No significant valvular abnormalities.  No pericardial effusion is present.  IVC diameter <2.1 cm collapsing >50% with sniff suggests a normal RA pressure  of 3 mmHg.  This study was compared with the study from 06/12/2023. The global LVEF is  lower.  ______________________________________________________________________________  Left Ventricle  Left ventricular wall thickness is normal. Left ventricular size is normal.  Left ventricular function is decreased. The ejection fraction is 50-55%  (borderline). Left ventricular diastolic function is indeterminate. There is  moderate hypokinesis of the basal inferior and mid inferior segments.     Right Ventricle  Mild right ventricular dilation is present. Global right ventricular function  is mildly reduced.     Atria  Mild biatrial enlargement is present.     Mitral Valve  The mitral valve is normal. Trace mitral insufficiency is present.     Aortic Valve  Aortic valve is normal in structure and function. On Doppler interrogation,  there is no significant stenosis or regurgitation.     Tricuspid Valve  The tricuspid valve is normal. Trace tricuspid insufficiency is present. The  right ventricular systolic pressure is approximated at 13.2 mmHg plus the  right atrial pressure.     Pulmonic Valve  The valve leaflets are not well visualized. Trace pulmonic insufficiency is  present.     Vessels  The aorta root cannot be assessed. The thoracic  aorta cannot be assessed. IVC  diameter <2.1 cm collapsing >50% with sniff suggests a normal RA pressure of 3  mmHg.     Pericardium  No pericardial effusion is present.     Compared to Previous Study  This study was compared with the study from 2023 . The global LVEF is  lower.  ______________________________________________________________________________  MMode/2D Measurements & Calculations  IVSd: 0.90 cm  LVIDd: 4.8 cm  LVPWd: 0.98 cm  LV mass(C)d: 158.6 grams  LV mass(C)dI: 74.0 grams/m2  RWT: 0.41     Doppler Measurements & Calculations  TR max pacheco: 182.0 cm/sec  TR max P.2 mmHg  RV S Pacheco: 7.5 cm/sec     ______________________________________________________________________________  Report approved by: Robert Aguilar 2023 03:57 PM

## 2023-06-20 NOTE — PLAN OF CARE
Goal Outcome Evaluation:  Major Shift Events: Pt afebrile overnight. Slept well, no complaints of pain. Ambulated SBA from chair to bed. Pt intermittently bradycardic, CSI contacted, 12 lead ordered.  BP stable overnight (see flowsheets for data). Pt on RA all night and satting >95. Urinal used, no bm overnight.  Plan: transfer to   For vital signs and complete assessments, please see documentation flowsheets.

## 2023-06-21 ENCOUNTER — APPOINTMENT (OUTPATIENT)
Dept: PHYSICAL THERAPY | Facility: CLINIC | Age: 78
DRG: 270 | End: 2023-06-21
Attending: INTERNAL MEDICINE
Payer: COMMERCIAL

## 2023-06-21 VITALS
RESPIRATION RATE: 18 BRPM | HEART RATE: 90 BPM | WEIGHT: 218.7 LBS | TEMPERATURE: 97.7 F | DIASTOLIC BLOOD PRESSURE: 60 MMHG | OXYGEN SATURATION: 100 % | SYSTOLIC BLOOD PRESSURE: 98 MMHG | BODY MASS INDEX: 32.3 KG/M2

## 2023-06-21 DIAGNOSIS — Z95.5 S/P CORONARY ARTERY STENT PLACEMENT: Primary | ICD-10-CM

## 2023-06-21 LAB
ALBUMIN SERPL BCG-MCNC: 3 G/DL (ref 3.5–5.2)
ALBUMIN SERPL BCG-MCNC: 3.2 G/DL (ref 3.5–5.2)
ALP SERPL-CCNC: 80 U/L (ref 40–129)
ALP SERPL-CCNC: 81 U/L (ref 40–129)
ALT SERPL W P-5'-P-CCNC: 55 U/L (ref 0–70)
ALT SERPL W P-5'-P-CCNC: 57 U/L (ref 0–70)
ANION GAP SERPL CALCULATED.3IONS-SCNC: 13 MMOL/L (ref 7–15)
ANION GAP SERPL CALCULATED.3IONS-SCNC: 13 MMOL/L (ref 7–15)
APTT PPP: 34 SECONDS (ref 22–38)
AST SERPL W P-5'-P-CCNC: 43 U/L (ref 0–45)
AST SERPL W P-5'-P-CCNC: 47 U/L (ref 0–45)
BILIRUB SERPL-MCNC: 0.9 MG/DL
BILIRUB SERPL-MCNC: 1 MG/DL
BUN SERPL-MCNC: 29.6 MG/DL (ref 8–23)
BUN SERPL-MCNC: 29.7 MG/DL (ref 8–23)
CALCIUM SERPL-MCNC: 10.1 MG/DL (ref 8.8–10.2)
CALCIUM SERPL-MCNC: 9.6 MG/DL (ref 8.8–10.2)
CHLORIDE SERPL-SCNC: 106 MMOL/L (ref 98–107)
CHLORIDE SERPL-SCNC: 108 MMOL/L (ref 98–107)
CREAT SERPL-MCNC: 0.98 MG/DL (ref 0.67–1.17)
CREAT SERPL-MCNC: 1.07 MG/DL (ref 0.67–1.17)
DEPRECATED HCO3 PLAS-SCNC: 16 MMOL/L (ref 22–29)
DEPRECATED HCO3 PLAS-SCNC: 17 MMOL/L (ref 22–29)
ERYTHROCYTE [DISTWIDTH] IN BLOOD BY AUTOMATED COUNT: 15.1 % (ref 10–15)
GFR SERPL CREATININE-BSD FRML MDRD: 71 ML/MIN/1.73M2
GFR SERPL CREATININE-BSD FRML MDRD: 78 ML/MIN/1.73M2
GLUCOSE BLDC GLUCOMTR-MCNC: 166 MG/DL (ref 70–99)
GLUCOSE SERPL-MCNC: 144 MG/DL (ref 70–99)
GLUCOSE SERPL-MCNC: 159 MG/DL (ref 70–99)
HCT VFR BLD AUTO: 31.2 % (ref 40–53)
HGB BLD-MCNC: 9.8 G/DL (ref 13.3–17.7)
MCH RBC QN AUTO: 28.2 PG (ref 26.5–33)
MCHC RBC AUTO-ENTMCNC: 31.4 G/DL (ref 31.5–36.5)
MCV RBC AUTO: 90 FL (ref 78–100)
PLATELET # BLD AUTO: 261 10E3/UL (ref 150–450)
POTASSIUM SERPL-SCNC: 4.2 MMOL/L (ref 3.4–5.3)
POTASSIUM SERPL-SCNC: 4.2 MMOL/L (ref 3.4–5.3)
PROT SERPL-MCNC: 6.3 G/DL (ref 6.4–8.3)
PROT SERPL-MCNC: 6.7 G/DL (ref 6.4–8.3)
RBC # BLD AUTO: 3.47 10E6/UL (ref 4.4–5.9)
SODIUM SERPL-SCNC: 135 MMOL/L (ref 136–145)
SODIUM SERPL-SCNC: 138 MMOL/L (ref 136–145)
WBC # BLD AUTO: 12.7 10E3/UL (ref 4–11)

## 2023-06-21 PROCEDURE — 85027 COMPLETE CBC AUTOMATED: CPT

## 2023-06-21 PROCEDURE — 250N000013 HC RX MED GY IP 250 OP 250 PS 637

## 2023-06-21 PROCEDURE — 250N000013 HC RX MED GY IP 250 OP 250 PS 637: Performed by: NURSE PRACTITIONER

## 2023-06-21 PROCEDURE — 97530 THERAPEUTIC ACTIVITIES: CPT | Mod: GP

## 2023-06-21 PROCEDURE — 36415 COLL VENOUS BLD VENIPUNCTURE: CPT

## 2023-06-21 PROCEDURE — 97116 GAIT TRAINING THERAPY: CPT | Mod: GP

## 2023-06-21 PROCEDURE — 84450 TRANSFERASE (AST) (SGOT): CPT

## 2023-06-21 PROCEDURE — 80053 COMPREHEN METABOLIC PANEL: CPT

## 2023-06-21 PROCEDURE — 99238 HOSP IP/OBS DSCHRG MGMT 30/<: CPT | Mod: FS | Performed by: STUDENT IN AN ORGANIZED HEALTH CARE EDUCATION/TRAINING PROGRAM

## 2023-06-21 PROCEDURE — 250N000013 HC RX MED GY IP 250 OP 250 PS 637: Performed by: STUDENT IN AN ORGANIZED HEALTH CARE EDUCATION/TRAINING PROGRAM

## 2023-06-21 PROCEDURE — 85730 THROMBOPLASTIN TIME PARTIAL: CPT

## 2023-06-21 RX ORDER — ATORVASTATIN CALCIUM 80 MG/1
80 TABLET, FILM COATED ORAL EVERY EVENING
Qty: 90 TABLET | Refills: 3 | Status: SHIPPED | OUTPATIENT
Start: 2023-06-21 | End: 2024-06-21

## 2023-06-21 RX ORDER — METOPROLOL SUCCINATE 25 MG/1
25 TABLET, EXTENDED RELEASE ORAL DAILY
Qty: 90 TABLET | Refills: 3 | Status: SHIPPED | OUTPATIENT
Start: 2023-06-22 | End: 2024-06-18

## 2023-06-21 RX ORDER — LISINOPRIL 5 MG/1
5 TABLET ORAL DAILY
Qty: 90 TABLET | Refills: 3 | Status: SHIPPED | OUTPATIENT
Start: 2023-06-22 | End: 2023-08-07

## 2023-06-21 RX ADMIN — TICAGRELOR 90 MG: 90 TABLET ORAL at 08:54

## 2023-06-21 RX ADMIN — APIXABAN 5 MG: 5 TABLET, FILM COATED ORAL at 08:55

## 2023-06-21 RX ADMIN — LISINOPRIL 5 MG: 5 TABLET ORAL at 08:54

## 2023-06-21 RX ADMIN — METOPROLOL SUCCINATE 25 MG: 25 TABLET, EXTENDED RELEASE ORAL at 08:53

## 2023-06-21 ASSESSMENT — ACTIVITIES OF DAILY LIVING (ADL)
ADLS_ACUITY_SCORE: 28
ADLS_ACUITY_SCORE: 28
ADLS_ACUITY_SCORE: 30
ADLS_ACUITY_SCORE: 30
ADLS_ACUITY_SCORE: 28
ADLS_ACUITY_SCORE: 30
ADLS_ACUITY_SCORE: 28
ADLS_ACUITY_SCORE: 30
ADLS_ACUITY_SCORE: 28

## 2023-06-21 NOTE — PROGRESS NOTES
Discharged to: home @1810  Belongings: clothing, cellphone, home medications  AVS (After Visit Summary) discussed with: patient

## 2023-06-21 NOTE — DISCHARGE SUMMARY
Physician Discharge Summary     Patient ID:  Kimani Glover  [id number]  77 year old  1945    Admit date: 6/11/2023    Discharge date and time: 6/21/2023     Admitting Physician: Alvino Oleary MD     Discharge Physician: Alvino Oleary    Admission Diagnoses: NSTEMI (non-ST elevated myocardial infarction) (H) [I21.4], Complete heart block    Discharge Diagnoses:   # Inferior STEMI  # RCA dissection with stenting of proximal to mid RCA  # Dyslipidemia  # A-fib  # THONG  # JUAN MIGUEL-resolved  #Anemia of critical illness  # Leukocytosis     Admission Condition: critical    Discharged Condition: good    Indication for Admission: Admitted for continued management of cardiogenic shock and complete heart block 2/2 to STEMI    Hospital Course:  Admitted for continued management of cardiogenic shock and complete heart block 2/2 to STEMI. Received temp pacer and IABP at OSH, arrived on epi and dopamine. Was able to quickly wean off of pressors, required dopamine for continued shock. Was started on  6/14, able to quickly wean off dopamine after that. Removed IABP friday 6/16 and was able to wean off of inotropes over the weekend. Was started on GDMT in the in patient setting and stable.    Consults: cardiology    Significant Diagnostic Studies: cardiac graphics: angiogram 6/11/23 distal RCA 70% stenosed, RPDA lesion 50% stenosed. RCA dominant stent from ostium genu inferius, with TIMI2 flow, stents patent    Treatments: procedures: coronary angiogram    Discharge Exam:  GENERAL APPEARANCE: healthy, alert and no distress  EYES: Eyes grossly normal to inspection, PERRL and conjunctivae and sclerae normal  RESP: lungs clear to auscultation - no rales, rhonchi or wheezes  CV: regular rates and rhythm, normal S1 S2, no S3 or S4, no murmur, click or rub, no peripheral edema and peripheral pulses strong  ABDOMEN: soft, nontender, no hepatosplenomegaly, no masses and bowel sounds normal   (male): normal male genitalia without  lesions or urethral discharge, no hernia  RECTAL: normal sphincter tone, no rectal masses, prostate of normal size, smooth, nontender without nodules or masses  MS: no musculoskeletal defects are noted and gait is age appropriate without ataxia  SKIN: no suspicious lesions or rashes  NEURO: Normal strength and tone, sensory exam grossly normal, mentation intact and speech normal  PSYCH: mentation appears normal and affect normal/bright    Disposition: home    Patient Instructions:   Current Discharge Medication List      START taking these medications    Details   apixaban ANTICOAGULANT (ELIQUIS) 5 MG tablet Take 1 tablet (5 mg) by mouth 2 times daily  Qty: 180 tablet, Refills: 3    Associated Diagnoses: Paroxysmal atrial fibrillation (H)      atorvastatin (LIPITOR) 80 MG tablet Take 1 tablet (80 mg) by mouth every evening  Qty: 90 tablet, Refills: 3    Associated Diagnoses: ACS (acute coronary syndrome) (H); ST elevation myocardial infarction (STEMI), unspecified artery (H); Hyperlipidemia, unspecified hyperlipidemia type      lisinopril (ZESTRIL) 5 MG tablet Take 1 tablet (5 mg) by mouth daily  Qty: 90 tablet, Refills: 3    Associated Diagnoses: ACS (acute coronary syndrome) (H); ST elevation myocardial infarction (STEMI), unspecified artery (H); Primary hypertension      metoprolol succinate ER (TOPROL XL) 25 MG 24 hr tablet Take 1 tablet (25 mg) by mouth daily  Qty: 90 tablet, Refills: 3    Associated Diagnoses: ACS (acute coronary syndrome) (H); ST elevation myocardial infarction (STEMI), unspecified artery (H)      ticagrelor (BRILINTA) 90 MG tablet Take 1 tablet (90 mg) by mouth 2 times daily  Qty: 90 tablet, Refills: 3    Associated Diagnoses: ACS (acute coronary syndrome) (H); ST elevation myocardial infarction (STEMI), unspecified artery (H)         CONTINUE these medications which have NOT CHANGED    Details   acetaminophen (TYLENOL) 500 MG tablet Take 2 tablets (1,000 mg) by mouth every 6 hours as needed  for mild pain    Associated Diagnoses: Acute appendicitis with localized peritonitis, without perforation, abscess, or gangrene      benzocaine-menthoL (CEPACOL) 15-3.6 mg [BENZOCAINE-MENTHOL (CEPACOL) 15-3.6 MG] Take 1 lozenge by mouth every hour as needed.  Refills: 0    Associated Diagnoses: Viral URI with cough      Multiple Vitamins-Minerals (MENS 50+ MULTI VITAMIN/MIN PO) Take 1 tablet by mouth daily      traZODone (DESYREL) 50 MG tablet [TRAZODONE (DESYREL) 50 MG TABLET] Take 50 mg by mouth at bedtime.      fish oil-omega-3 fatty acids (FISH OIL) 300-1,000 mg capsule [FISH OIL-OMEGA-3 FATTY ACIDS (FISH OIL) 300-1,000 MG CAPSULE] Take 1 g by mouth daily.         STOP taking these medications       aspirin effervescent (HEIDE-SELTZER) 325 MG TBEF tablet Comments:   Reason for Stopping:         losartan-hydrochlorothiazide (HYZAAR) 100-12.5 MG tablet Comments:   Reason for Stopping:         pravastatin (PRAVACHOL) 10 MG tablet Comments:   Reason for Stopping:             Activity: activity as tolerated  Diet: cardiac diet  Wound Care: none needed    Follow-up with Cardiology [facility] in 2 weeks.    Signed:  MARIAH Stout CNP  6/21/2023  2:24 PM

## 2023-06-21 NOTE — PROGRESS NOTES
Patient Name:                 Kimani Glover  Patient Medical Record:  [id number]     # Hypokalemia     Labs:     06/15/23 16:06 06/15/23 21:53   Potassium 3.2  3.2       Treated with electrolyte repletion

## 2023-06-21 NOTE — PLAN OF CARE
ICU End of Shift Summary. See flowsheets for vital signs and detailed assessment.    Changes this shift: No changes over night. Pt stable on RA slept well. Large unmeasured urine output.    Plan:  discharge to home today   Goal Outcome Evaluation:

## 2023-06-22 NOTE — CARE PLAN
Physical Therapy Discharge Summary    Reason for therapy discharge:    Discharged to home with outpatient therapy.    Progress towards therapy goal(s). See goals on Care Plan in [facility] electronic health record for goal details.  Goals partially met.  Barriers to achieving goals:   discharge from facility.    Therapy recommendation(s):    Continued therapy is recommended.  Rationale/Recommendations:  OP CR and PT to address deficits in functional mobility and balance.[

## 2023-06-22 NOTE — PROGRESS NOTES
# Severe protein calorie malnutrition  Inadequate oral intake related to supsected decreased appetite in setting of critical illness as evidenced by kcal cts showing suboptimal PO (<50% assessed kcal needs).       INTERVENTIONS  Implementation  -Nutrition Education: Unable to complete due to pt asleep.   -Medical food supplement therapy: Room service to offer Ensure with meals.

## 2023-06-22 NOTE — PLAN OF CARE
Occupational Therapy Discharge Summary    Reason for therapy discharge:    Discharged to home with outpatient therapy.    Progress towards therapy goal(s). See goals on Care Plan in [facility] electronic health record for goal details.  Goals partially met.  Barriers to achieving goals:   discharge from facility.    Therapy recommendation(s):    Continued therapy is recommended.  Rationale/Recommendations:  Per last OT who worked with pt, recommending home with assist and OP CR to progress ADL IND/safety, functional mobility, overall activity tolerance.

## 2023-08-07 ENCOUNTER — OFFICE VISIT (OUTPATIENT)
Dept: CARDIOLOGY | Facility: CLINIC | Age: 78
End: 2023-08-07
Attending: INTERNAL MEDICINE
Payer: COMMERCIAL

## 2023-08-07 VITALS
WEIGHT: 224.2 LBS | BODY MASS INDEX: 33.98 KG/M2 | OXYGEN SATURATION: 97 % | DIASTOLIC BLOOD PRESSURE: 84 MMHG | SYSTOLIC BLOOD PRESSURE: 139 MMHG | HEART RATE: 89 BPM | HEIGHT: 68 IN

## 2023-08-07 DIAGNOSIS — I21.3 ST ELEVATION MYOCARDIAL INFARCTION (STEMI), UNSPECIFIED ARTERY (H): ICD-10-CM

## 2023-08-07 DIAGNOSIS — I24.9 ACS (ACUTE CORONARY SYNDROME) (H): ICD-10-CM

## 2023-08-07 DIAGNOSIS — I10 PRIMARY HYPERTENSION: ICD-10-CM

## 2023-08-07 PROCEDURE — G0463 HOSPITAL OUTPT CLINIC VISIT: HCPCS | Performed by: INTERNAL MEDICINE

## 2023-08-07 PROCEDURE — 99214 OFFICE O/P EST MOD 30 MIN: CPT | Performed by: INTERNAL MEDICINE

## 2023-08-07 RX ORDER — LISINOPRIL 10 MG/1
10 TABLET ORAL DAILY
Qty: 90 TABLET | Refills: 3 | Status: SHIPPED | OUTPATIENT
Start: 2023-08-07 | End: 2024-08-09

## 2023-08-07 ASSESSMENT — PAIN SCALES - GENERAL: PAINLEVEL: NO PAIN (0)

## 2023-08-07 NOTE — LETTER
8/7/2023      RE: Kimani Glover  [address] [place] [address]       Dear Colleague,    Thank you for the opportunity to participate in the care of your patient, Kimani Glover, at the [facility]ART [facility] [place] at [facility]. Please see a copy of my visit note below.    [facility]  CARDIOVASCULAR MEDICINE CLINIC NOTE    Referring Provider: Alvino Oleary   Primary Care Provider: No Ref-Primary, Physician     Patient Name: Kimani Glover   MRN: [id number]     PERTINENT CLINICAL HISTORY:   Kimani Glover is a 77 year old gentleman w/ no known PMH admitted to [facility] w/ inferior STEMI and cardiogenic shock 6/11/2023 in the setting of bradycardia, near syncope, and hypotension.  Attempted PCI of the RCA was complicated by dissection w/ no-reflow.  Temp pacer was placed and patient was transferred to [facility].  Repeat coronary angiogram showed TIMI2 flow down the RCA with 70% distal lesion but no need for further intervention.  He is returning for a follow-up clinic visit.      Mr. Glover has done well since discharge.  He has increased activity without any symptoms.  He denies any chest pain, palpitations, SOB, edema, orthopnea, PND, syncope or near syncope.     PAST MEDICAL HISTORY:   No past medical history on file.     PAST SURGICAL HISTORY:     Past Surgical History:   Procedure Laterality Date     CV CORONARY ANGIOGRAM N/A 6/11/2023    Procedure: Coronary Angiogram;  Surgeon: Filemon Leavitt MD;  Location: [facility] CV     CV CORONARY ANGIOGRAM N/A 6/11/2023    Procedure: Coronary Angiogram;  Surgeon: Derek Mullen MD;  Location: [facility] CARDIAC CATH LAB     CV INTRA AORTIC BALLOON N/A 6/11/2023    Procedure: Intraprocedure Aortic Balloon Pump Insertion;  Surgeon: Filemon Leavitt MD;  Location: [facility] CV     CV PCI ASPIRATION THROMECTOMY N/A 6/11/2023    Procedure: Percutaneous Coronary  "Intervention Aspiration Thrombectomy;  Surgeon: Filemon Leavitt MD;  Location: [facility] CV     CV PCI STENT DRUG ELUTING N/A 6/11/2023    Procedure: Percutaneous Coronary Intervention Stent;  Surgeon: Filemon Leavitt MD;  Location: [facility] CV     CV TEMPORARY PACEMAKER INSERTION N/A 6/11/2023    Procedure: Temporary Pacemaker Insertion;  Surgeon: Filemon Leavitt MD;  Location: [facility] CV     LAPAROSCOPIC APPENDECTOMY N/A 9/2/2022    Procedure: APPENDECTOMY, LAPAROSCOPIC;  Surgeon: Robi Carmona MD;  Location: [facility] Main OR        CURRENT MEDICATIONS:     Current Outpatient Medications   Medication Sig Dispense Refill     acetaminophen (TYLENOL) 500 MG tablet Take 2 tablets (1,000 mg) by mouth every 6 hours as needed for mild pain       apixaban ANTICOAGULANT (ELIQUIS) 5 MG tablet Take 1 tablet (5 mg) by mouth 2 times daily 180 tablet 3     atorvastatin (LIPITOR) 80 MG tablet Take 1 tablet (80 mg) by mouth every evening 90 tablet 3     fish oil-omega-3 fatty acids (FISH OIL) 300-1,000 mg capsule [FISH OIL-OMEGA-3 FATTY ACIDS (FISH OIL) 300-1,000 MG CAPSULE] Take 1 g by mouth daily.       lisinopril (ZESTRIL) 10 MG tablet Take 1 tablet (10 mg) by mouth daily 90 tablet 3     metoprolol succinate ER (TOPROL XL) 25 MG 24 hr tablet Take 1 tablet (25 mg) by mouth daily 90 tablet 3     Multiple Vitamins-Minerals (MENS 50+ MULTI VITAMIN/MIN PO) Take 1 tablet by mouth daily       ticagrelor (BRILINTA) 90 MG tablet Take 1 tablet (90 mg) by mouth 2 times daily 90 tablet 3     traZODone (DESYREL) 50 MG tablet [TRAZODONE (DESYREL) 50 MG TABLET] Take 50 mg by mouth at bedtime.          ALLERGIES:   No Known Allergies      PHYSICAL EXAMINATION:   /84 (BP Location: Right arm, Patient Position: Chair, Cuff Size: Adult Large)   Pulse 89   Ht 1.735 m (5' 8.31\")   Wt 101.7 kg (224 lb 3.2 oz)   SpO2 97%   BMI 33.78 kg/m    Body mass index is 33.78 kg/m .  Wt Readings from Last 2 " Encounters:   08/07/23 101.7 kg (224 lb 3.2 oz)   06/19/23 99.2 kg (218 lb 11.1 oz)     Constitutional: no acute distress, pleasant and cooperative, appears overall well.  Cardiovascular: RRR nl S1S2, JVP not elevated, extremities with no edema or cyanosis  Respiratory: clear to auscultation and percussion bilaterally anterior and posterior  Gastrointestinal: soft, nontender, non distended, no hepatosplenomegaly or masses  Neurologic: AOx3     LABORATORY DATA:   I have reviewed the labs below.    LIPID RESULTS:  No results for input(s): CHOL, HDL, LDL, TRIG, CHOLHDLRATIO in the last 91040 hours.     LIVER ENZYME RESULTS:  Recent Labs   Lab Test 06/21/23  1551 06/21/23  0459   AST 43 47*   ALT 55 57       CBC RESULTS:  Recent Labs   Lab Test 06/21/23  0459 06/20/23  0442   WBC 12.7* 8.4   HGB 9.8* 9.2*   HCT 31.2* 28.9*    202       BMP RESULTS:  Recent Labs   Lab Test 06/21/23  1551 06/21/23  0858 06/21/23  0459   *  --  138   POTASSIUM 4.2  --  4.2   CHLORIDE 106  --  108*   CO2 16*  --  17*   ANIONGAP 13  --  13   * 166* 144*   BUN 29.6*  --  29.7*   CR 0.98  --  1.07   SUSAN 10.1  --  9.6       A1C RESULTS:  Lab Results   Component Value Date    A1C 5.6 06/12/2023       INR RESULTS:  Recent Labs   Lab Test 06/11/23  1311 06/11/23  0837   INR 1.18* 1.07          PROCEDURES & FURTHER ASSESSMENTS:   I have reviewed the test results below.    ECHO: 6/19/2023  Left ventricular function is decreased. The ejection fraction is 50-55%  (borderline). There is moderate hypokinesis of the basal inferior and mid inferior segments.  Global right ventricular function is mildly reduced. Mild right ventricular dilation is present.  No significant valvular abnormalities.  No pericardial effusion is present.  IVC diameter <2.1 cm collapsing >50% with sniff suggests a normal RA pressure of 3 mmHg.  This study was compared with the study from 06/12/2023. The global LVEF is lower.    CARDIAC CATH:  6/11/2023  The RCA  is a dominant artery with TIMI2 flow. It is stented from the ostium to the genu inferius, the stents are patent.   The RCA has a 70% lesion in its distal segment, that does not require emergency treatment.    CARDIAC CATH:  6/11/2023  Acute inferolateral STEMI with cardiogenic shock, RV infarct, due to totally occluded right coronary artery.  80% lesion in first diagonal but no significant disease in LAD or left circumflex main trunk vessels.  Complex intervention to right coronary artery with multiple balloon balloon inflations, intracoronary thrombectomy and drug-eluting stent implant x2  Patient treated with intravenous dopamine, epinephrine, with placement of intra-aortic balloon pump, temporary pacemaker wire.    I personally reviewed the images from the coronary angiograms above.     CLINICAL IMPRESSION:   Kimani Glover is a 77 year old gentleman w/ h/o HTN and DM2 admitted to [facility]/ inferior STEMI and cardiogenic shock 6/11/2023 in the setting of bradycardia, near syncope, and hypotension.  PCI was complex with stents in prox-to-mid RCA.  He is doing well since discharge without symptoms.     Inferior STEMI: RCA occlusion s/p PCI x2.  Now asymptomatic.  Residual distal RCA disease present.  LVEF mildly reduced at 50-55%.  --ticagrelor 90mg BID at least 1 year - consider lifelong  --apixaban instead of ASA  --atorvastatin 80mg Qday, toprol XL 25mg Qday, and lisinopril 10mg Qday  --consider repeat coronary angiogram if symptoms arise given residual distal RCA disease  --encouraged cardiac rehab but defer for now  --repeat TTE at 1 year    Atrial Fibrillation: started doac during admission.  Afib began as temp pacer was weaned.  Resolved prior to discharge.  --continue apixaban for now  --reevaluate with zio in 1 year, consider cessation of apixaban and switch to single anti-platelet therapy    Follow-up: 1 year    Thank you for allowing us to take part in the care of this very pleasant patient.  Please  do not hesitate to call if any further questions or concerns arise.    I spent 30 min today reviewing the medical record, meeting with the patient, and completing this note.    Opal Oleary MD, PhD  Interventional/Critical Care Cardiology  [phone]    August 7, 2023        Patient Care Team:  No Ref-Primary, Physician as PCP - OPAL Bedoya      Please do not hesitate to contact me if you have any questions/concerns.     Sincerely,     Opal Oleary MD

## 2023-08-07 NOTE — PATIENT INSTRUCTIONS
Patient Instructions:  It was a pleasure to see you in the cardiology clinic today.      If you have any questions, call  Beth Bone RN, at [phone].   [facility].  To schedule an appointment or to leave a message for your Care Team Press #1  If you are a physician calling for another physician Press #2  For Billing Press #3  For Medical Records Press #4  We are encouraging the use of [email or web address] to communicate with your HealthCare Provider    Note the new medications: increase your lisinopril to 10 mg by mouth daily  Stop the following medications: none    The results from today include: none  Please follow up with Dr. Oleary in one year      If you have an urgent need after hours (8:00 am to 4:30 pm) please call [phone] and ask for the cardiology fellow on call.

## 2023-08-07 NOTE — NURSING NOTE
Chief Complaint   Patient presents with    New Patient     NEW critical care       Vitals were taken, medications reconciled.    Paige Thurner, Facilitator   2:40 PM

## 2023-09-05 NOTE — PROGRESS NOTES
[facility]  CARDIOVASCULAR MEDICINE CLINIC NOTE    Referring Provider: Alvino Oleary   Primary Care Provider: No Ref-Primary, Physician     Patient Name: Kimani Glover   MRN: [id number]     PERTINENT CLINICAL HISTORY:   Kimani Glover is a 77 year old gentleman w/ no known PMH admitted to [facility] w/ inferior STEMI and cardiogenic shock 6/11/2023 in the setting of bradycardia, near syncope, and hypotension.  Attempted PCI of the RCA was complicated by dissection w/ no-reflow.  Temp pacer was placed and patient was transferred to [facility].  Repeat coronary angiogram showed TIMI2 flow down the RCA with 70% distal lesion but no need for further intervention.  He is returning for a follow-up clinic visit.      Mr. Glover has done well since discharge.  He has increased activity without any symptoms.  He denies any chest pain, palpitations, SOB, edema, orthopnea, PND, syncope or near syncope.     PAST MEDICAL HISTORY:   No past medical history on file.     PAST SURGICAL HISTORY:     Past Surgical History:   Procedure Laterality Date    CV CORONARY ANGIOGRAM N/A 6/11/2023    Procedure: Coronary Angiogram;  Surgeon: Filemon Leavitt MD;  Location: [facility]    CV CORONARY ANGIOGRAM N/A 6/11/2023    Procedure: Coronary Angiogram;  Surgeon: Derek Mullen MD;  Location: [facility] CARDIAC CATH LAB    CV INTRA AORTIC BALLOON N/A 6/11/2023    Procedure: Intraprocedure Aortic Balloon Pump Insertion;  Surgeon: Filemon Leavitt MD;  Location: [facility] CV    CV PCI ASPIRATION THROMECTOMY N/A 6/11/2023    Procedure: Percutaneous Coronary Intervention Aspiration Thrombectomy;  Surgeon: Filemon Leavitt MD;  Location: [facility]    CV PCI STENT DRUG ELUTING N/A 6/11/2023    Procedure: Percutaneous Coronary Intervention Stent;  Surgeon: Filemon Leavitt MD;  Location: [facility]    CV TEMPORARY PACEMAKER INSERTION N/A 6/11/2023    Procedure: Temporary Pacemaker  "Insertion;  Surgeon: Filemon Leavitt MD;  Location: [facility] CATH LAB CV    LAPAROSCOPIC APPENDECTOMY N/A 9/2/2022    Procedure: APPENDECTOMY, LAPAROSCOPIC;  Surgeon: Robi Carmona MD;  Location: [facility] Main OR        CURRENT MEDICATIONS:     Current Outpatient Medications   Medication Sig Dispense Refill    acetaminophen (TYLENOL) 500 MG tablet Take 2 tablets (1,000 mg) by mouth every 6 hours as needed for mild pain      apixaban ANTICOAGULANT (ELIQUIS) 5 MG tablet Take 1 tablet (5 mg) by mouth 2 times daily 180 tablet 3    atorvastatin (LIPITOR) 80 MG tablet Take 1 tablet (80 mg) by mouth every evening 90 tablet 3    fish oil-omega-3 fatty acids (FISH OIL) 300-1,000 mg capsule [FISH OIL-OMEGA-3 FATTY ACIDS (FISH OIL) 300-1,000 MG CAPSULE] Take 1 g by mouth daily.      lisinopril (ZESTRIL) 10 MG tablet Take 1 tablet (10 mg) by mouth daily 90 tablet 3    metoprolol succinate ER (TOPROL XL) 25 MG 24 hr tablet Take 1 tablet (25 mg) by mouth daily 90 tablet 3    Multiple Vitamins-Minerals (MENS 50+ MULTI VITAMIN/MIN PO) Take 1 tablet by mouth daily      ticagrelor (BRILINTA) 90 MG tablet Take 1 tablet (90 mg) by mouth 2 times daily 90 tablet 3    traZODone (DESYREL) 50 MG tablet [TRAZODONE (DESYREL) 50 MG TABLET] Take 50 mg by mouth at bedtime.          ALLERGIES:   No Known Allergies      PHYSICAL EXAMINATION:   /84 (BP Location: Right arm, Patient Position: Chair, Cuff Size: Adult Large)   Pulse 89   Ht 1.735 m (5' 8.31\")   Wt 101.7 kg (224 lb 3.2 oz)   SpO2 97%   BMI 33.78 kg/m    Body mass index is 33.78 kg/m .  Wt Readings from Last 2 Encounters:   08/07/23 101.7 kg (224 lb 3.2 oz)   06/19/23 99.2 kg (218 lb 11.1 oz)     Constitutional: no acute distress, pleasant and cooperative, appears overall well.  Cardiovascular: RRR nl S1S2, JVP not elevated, extremities with no edema or cyanosis  Respiratory: clear to auscultation and percussion bilaterally anterior and posterior  Gastrointestinal: " soft, nontender, non distended, no hepatosplenomegaly or masses  Neurologic: AOx3     LABORATORY DATA:   I have reviewed the labs below.    LIPID RESULTS:  No results for input(s): CHOL, HDL, LDL, TRIG, CHOLHDLRATIO in the last 03488 hours.     LIVER ENZYME RESULTS:  Recent Labs   Lab Test 06/21/23  1551 06/21/23  0459   AST 43 47*   ALT 55 57       CBC RESULTS:  Recent Labs   Lab Test 06/21/23  0459 06/20/23  0442   WBC 12.7* 8.4   HGB 9.8* 9.2*   HCT 31.2* 28.9*    202       BMP RESULTS:  Recent Labs   Lab Test 06/21/23  1551 06/21/23  0858 06/21/23  0459   *  --  138   POTASSIUM 4.2  --  4.2   CHLORIDE 106  --  108*   CO2 16*  --  17*   ANIONGAP 13  --  13   * 166* 144*   BUN 29.6*  --  29.7*   CR 0.98  --  1.07   SUSAN 10.1  --  9.6       A1C RESULTS:  Lab Results   Component Value Date    A1C 5.6 06/12/2023       INR RESULTS:  Recent Labs   Lab Test 06/11/23  1311 06/11/23  0837   INR 1.18* 1.07          PROCEDURES & FURTHER ASSESSMENTS:   I have reviewed the test results below.    ECHO: 6/19/2023  Left ventricular function is decreased. The ejection fraction is 50-55%  (borderline). There is moderate hypokinesis of the basal inferior and mid inferior segments.  Global right ventricular function is mildly reduced. Mild right ventricular dilation is present.  No significant valvular abnormalities.  No pericardial effusion is present.  IVC diameter <2.1 cm collapsing >50% with sniff suggests a normal RA pressure of 3 mmHg.  This study was compared with the study from 06/12/2023. The global LVEF is lower.    CARDIAC CATH:  6/11/2023  The RCA is a dominant artery with TIMI2 flow. It is stented from the ostium to the genu inferius, the stents are patent.   The RCA has a 70% lesion in its distal segment, that does not require emergency treatment.    CARDIAC CATH:  6/11/2023  Acute inferolateral STEMI with cardiogenic shock, RV infarct, due to totally occluded right coronary artery.  80% lesion in  first diagonal but no significant disease in LAD or left circumflex main trunk vessels.  Complex intervention to right coronary artery with multiple balloon balloon inflations, intracoronary thrombectomy and drug-eluting stent implant x2  Patient treated with intravenous dopamine, epinephrine, with placement of intra-aortic balloon pump, temporary pacemaker wire.    I personally reviewed the images from the coronary angiograms above.     CLINICAL IMPRESSION:   Kimani Glover is a 77 year old gentleman w/ h/o HTN and DM2 admitted to [facility]/ inferior STEMI and cardiogenic shock 6/11/2023 in the setting of bradycardia, near syncope, and hypotension.  PCI was complex with stents in prox-to-mid RCA.  He is doing well since discharge without symptoms.     Inferior STEMI: RCA occlusion s/p PCI x2.  Now asymptomatic.  Residual distal RCA disease present.  LVEF mildly reduced at 50-55%.  --ticagrelor 90mg BID at least 1 year - consider lifelong  --apixaban instead of ASA  --atorvastatin 80mg Qday, toprol XL 25mg Qday, and lisinopril 10mg Qday  --consider repeat coronary angiogram if symptoms arise given residual distal RCA disease  --encouraged cardiac rehab but defer for now  --repeat TTE at 1 year    Atrial Fibrillation: started doac during admission.  Afib began as temp pacer was weaned.  Resolved prior to discharge.  --continue apixaban for now  --reevaluate with zio in 1 year, consider cessation of apixaban and switch to single anti-platelet therapy    Follow-up: 1 year    Thank you for allowing us to take part in the care of this very pleasant patient.  Please do not hesitate to call if any further questions or concerns arise.    I spent 30 min today reviewing the medical record, meeting with the patient, and completing this note.    Alvino Oleary MD, PhD  Interventional/Critical Care Cardiology  [phone]    August 7, 2023      CC  Patient Care Team:  No Ref-Primary, Physician as PCP - ALVINO Bedoya  A

## 2023-10-14 ENCOUNTER — HEALTH MAINTENANCE LETTER (OUTPATIENT)
Age: 78
End: 2023-10-14

## 2023-12-14 PROBLEM — I50.9: Status: ACTIVE | Noted: 2023-12-14

## 2023-12-14 PROBLEM — I50.9: Status: RESOLVED | Noted: 2023-12-14 | Resolved: 2023-12-14

## 2023-12-19 ENCOUNTER — DOCUMENTATION ONLY (OUTPATIENT)
Dept: ANTICOAGULATION | Facility: CLINIC | Age: 78
End: 2023-12-19
Payer: COMMERCIAL

## 2023-12-19 ENCOUNTER — ANTICOAGULATION THERAPY VISIT (OUTPATIENT)
Dept: ANTICOAGULATION | Facility: CLINIC | Age: 78
End: 2023-12-19
Payer: COMMERCIAL

## 2023-12-19 DIAGNOSIS — I21.3 ST ELEVATION MYOCARDIAL INFARCTION (STEMI), UNSPECIFIED ARTERY (H): Primary | ICD-10-CM

## 2023-12-19 NOTE — PROGRESS NOTES
"ANTICOAGULATION  MANAGEMENT: NEW REFERRAL      SUBJECTIVE/OBJECTIVE     Kimani Glover, a 77 year old male  is newly referred to [facility].    Anticoagulation:    Previously on warfarin: No, has been on Apixaban (Eliquis); transitioning to warfarin.  Warfarin initiation date (approximate): 12/19/23   Indication(s):  MI; afib   Goal Range: 2.0-3.0   Anticoagulation Bridge/Overlap: No   Referring provider: from heartcare provider    General Dietary/Social Hx:    Typical vitamin K intake: moderate; consistent     Other dietary considerations: None     Social History:   Social History     Tobacco Use     Smoking status: Never     Smokeless tobacco: Never   Substance Use Topics     Alcohol use: Yes     Alcohol/week: 15.0 standard drinks of alcohol     Drug use: Never       In the past 2 weeks, patient estimates taking medications as instructed % of time: 100    Results:        No results for input(s): \"INR\", \"[id number]\", \"F2\", \"ALMWH\" in the last 168 hours.    Wt Readings from Last 2 Encounters:   08/07/23 101.7 kg (224 lb 3.2 oz)   06/19/23 99.2 kg (218 lb 11.1 oz)      Estimated body mass index is 33.78 kg/m  as calculated from the following:    Height as of 8/7/23: 1.735 m (5' 8.31\").    Weight as of 8/7/23: 101.7 kg (224 lb 3.2 oz).  Lab Results   Component Value Date    AST 43 06/21/2023    ALT 55 06/21/2023    ALBUMIN 3.2 (L) 06/21/2023     Lab Results   Component Value Date    CR 0.98 06/21/2023     Estimated Creatinine Clearance: 71 mL/min (based on SCr of 0.98 mg/dL).    ASSESSMENT     Goal INR 2-3, standard for indication(s) above  Establishing initial warfarin maintenance dose (on warfarin < 30 days)   Factors that may increase sensitivity to warfarin: Age > 75  Factors that may reduce sensitivity to warfarin: Male Gender and Weight > 90 kg  Potential significant drug interactions with home medications: None noted  Starting warfarin dose is appropriate for patient's " anticipated sensitivity to warfarin    PLAN     Dosing Instructions: Start warfarin with INR in 3 days       Summary  As of 12/19/2023      Full warfarin instructions:  4 mg every day   Next INR check:  12/22/2023               Education provided:   Taking warfarin: take warfarin at same time each day; preferably in the evening, importance of following [facility] instructions vs instructions on the prescription bottle, and Importance of taking warfarin as instructed  Goal range and lab monitoring: goal range and significance of current result, Importance of following up at instructed interval, and frequency of lab work when starting warfarin and importance of following up when instructed (extends after stability established)  Dietary considerations: importance of consistent vitamin K intake    Education still needed:   Symptom monitoring: monitoring for bleeding signs and symptoms, when to seek medical attention/emergency care, and if you hit your head or have a bad fall seek emergency care      Telephone call with Chely who verbalizes understanding and agrees to plan and who agrees to plan and repeated back plan correctly    Patient will walk into [facility]    Standing orders placed in Epic: Point of Care INR (Lab 5000)    Plan made per [facility] anticoagulation protocol    Malaika Urban RN  Anticoagulation Clinic  12/19/2023

## 2023-12-19 NOTE — PROGRESS NOTES
Needs to be managed by [facility].    Will forward for them to take over.    Kya Moran RN

## 2023-12-22 ENCOUNTER — ANTICOAGULATION THERAPY VISIT (OUTPATIENT)
Dept: ANTICOAGULATION | Facility: CLINIC | Age: 78
End: 2023-12-22

## 2023-12-22 ENCOUNTER — LAB (OUTPATIENT)
Dept: LAB | Facility: HOSPITAL | Age: 78
End: 2023-12-22
Payer: COMMERCIAL

## 2023-12-22 DIAGNOSIS — I21.3 ST ELEVATION MYOCARDIAL INFARCTION (STEMI), UNSPECIFIED ARTERY (H): Primary | ICD-10-CM

## 2023-12-22 LAB — INR PPP: 1.19 (ref 0.85–1.15)

## 2023-12-22 PROCEDURE — 85610 PROTHROMBIN TIME: CPT

## 2023-12-22 PROCEDURE — 36415 COLL VENOUS BLD VENIPUNCTURE: CPT

## 2023-12-22 NOTE — PROGRESS NOTES
ANTICOAGULATION MANAGEMENT     Kimani Glover 77 year old male is on warfarin with subtherapeutic INR result. (Goal INR 2.0-3.0)    Recent labs: (last 7 days)     12/22/23  0955   INR 1.19*       ASSESSMENT     Source(s): Chart Review and Patient/Caregiver Call     Warfarin doses taken: Warfarin taken as instructed  Diet: No new diet changes identified  Medication/supplement changes: None noted  New illness, injury, or hospitalization: No  Signs or symptoms of bleeding or clotting: No  Previous result:  New to warfarin  Additional findings: New start on day 4 of warfarin       PLAN     Recommended plan for no diet, medication or health factor changes affecting INR     Dosing Instructions: booster dose then Increase your warfarin dose (28% change) with next INR in 4 days       Summary  As of 12/22/2023      Full warfarin instructions:  12/22: 6 mg; Otherwise 4 mg every Mon, Wed, Fri; 6 mg all other days   Next INR check:  12/26/2023               Telephone call with Felicia who verbalizes understanding and agrees to plan and who agrees to plan and repeated back plan correctly    Patient will use walk in lab at [facility]     Education provided:   Taking warfarin: Importance of taking warfarin as instructed  Goal range and lab monitoring: goal range and significance of current result, Importance of therapeutic range, and Importance of following up at instructed interval    Plan made per [facility] anticoagulation protocol    Malaika Urban RN  Anticoagulation Clinic  12/22/2023    _______________________________________________________________________     Anticoagulation Episode Summary       Current INR goal:  2.0-3.0   TTR:  --   Target end date:  Indefinite   Send INR reminders to:  [facility]    Indications    ST elevation myocardial infarction (STEMI)  unspecified artery (H) [I21.3]             Comments:               Anticoagulation Care Providers       Provider Role Specialty Phone number    Alvino Oleary  MD ILYA Referring Cardiovascular Disease [phone]

## 2024-01-11 ENCOUNTER — LAB (OUTPATIENT)
Dept: LAB | Facility: HOSPITAL | Age: 79
End: 2024-01-11
Payer: COMMERCIAL

## 2024-01-11 ENCOUNTER — ANTICOAGULATION THERAPY VISIT (OUTPATIENT)
Dept: ANTICOAGULATION | Facility: CLINIC | Age: 79
End: 2024-01-11

## 2024-01-11 DIAGNOSIS — I21.3 ST ELEVATION MYOCARDIAL INFARCTION (STEMI), UNSPECIFIED ARTERY (H): Primary | ICD-10-CM

## 2024-01-11 LAB — INR PPP: 4.73 (ref 0.85–1.15)

## 2024-01-11 PROCEDURE — 85610 PROTHROMBIN TIME: CPT

## 2024-01-11 PROCEDURE — 36415 COLL VENOUS BLD VENIPUNCTURE: CPT

## 2024-01-11 NOTE — PROGRESS NOTES
ANTICOAGULATION MANAGEMENT     Kimani Glover 78 year old male is on warfarin with supratherapeutic INR result. (Goal INR 2.0-3.0)    Recent labs: (last 7 days)     01/11/24  0931   INR 4.73*       ASSESSMENT     Source(s): Chart Review and Patient/Caregiver Call     Warfarin doses taken: Less warfarin taken than planned which may be affecting INR and updated calendar with 4mg/day  Diet: No new diet changes identified  Medication/supplement changes: None noted  New illness, injury, or hospitalization: No  Signs or symptoms of bleeding or clotting: No  Previous result: Subtherapeutic  Additional findings:  Patient taking 4mg/day with supratherapeutic INR results.  Discussed at length with ARGELIA Madera.  Updated her contact information.  Patient is requesting a follow up appt.  Would like to stop taking Warfarin and go back to a DOAC.  Apixaban was expensive, but with beginning of new annual insurance cycle would like to transition to it or discuss another option (Xarelto?).  Routed to Dr. Oleary, Malachi Castro & Beth Bone.   Patient family verbalized understanding of Hold and reduction in Warfarin dose.  Will test an INR in one week, will need to determine how to transition with documented results.       PLAN     Recommended plan for ongoing change(s) affecting INR     Dosing Instructions:  Hold Warfarin 1/11/24, Then take 2mg every MWF; 4mg ROW  with next INR in 1 week       Summary  As of 1/11/2024      Full warfarin instructions:  1/11: Hold; Otherwise 2 mg every Mon, Wed, Fri; 4 mg all other days   Next INR check:  1/18/2024               Telephone call with Cassy who verbalizes understanding and agrees to plan and who agrees to plan and repeated back plan correctly    Uses [facility] walk in lab    Education provided:   Please call back if any changes to your diet, medications or how you've been taking warfarin  Taking warfarin: purpose of warfarin and how it works, take warfarin at same time each day;  preferably in the evening, prescribed tablet strength and color, and importance of following [facility] instructions vs instructions on the prescription bottle  Goal range and lab monitoring: goal range and significance of current result, Importance of therapeutic range, and Importance of following up at instructed interval  Dietary considerations: importance of consistent vitamin K intake and impact of vitamin K foods on INR  Symptom monitoring: monitoring for bleeding signs and symptoms, monitoring for clotting signs and symptoms, monitoring for stroke signs and symptoms, when to seek medical attention/emergency care, and if you hit your head or have a bad fall seek emergency care  Importance of notifying anticoagulation clinic for: changes in medications; a sooner lab recheck maybe needed and diarrhea, nausea/vomiting, reduced intake, cold/flu, and/or infections; a sooner lab recheck maybe needed  Contact [phone] with any changes, questions or concerns.     Plan made with [facility] Pharmacist Eloy Alanis RN  Anticoagulation Clinic  1/11/2024    _______________________________________________________________________     Anticoagulation Episode Summary       Current INR goal:  2.0-3.0   TTR:  27.1% (1.9 wk)   Target end date:  Indefinite   Send INR reminders to:  [facility]    Indications    ST elevation myocardial infarction (STEMI)  unspecified artery (H) [I21.3]             Comments:               Anticoagulation Care Providers       Provider Role Specialty Phone number    Alvino Oleary MD Referring Cardiovascular Disease [phone]

## 2024-01-15 ENCOUNTER — TELEPHONE (OUTPATIENT)
Dept: CARDIOLOGY | Facility: CLINIC | Age: 79
End: 2024-01-15
Payer: COMMERCIAL

## 2024-01-15 DIAGNOSIS — I21.3 ST ELEVATION MYOCARDIAL INFARCTION (STEMI), UNSPECIFIED ARTERY (H): ICD-10-CM

## 2024-01-15 DIAGNOSIS — I24.9 ACS (ACUTE CORONARY SYNDROME) (H): ICD-10-CM

## 2024-01-15 NOTE — TELEPHONE ENCOUNTER
"Daughter in law requesting that patient return to taking Eliquis instead of the Warfarin due to INRs being \"all over the place\". PA placed. Kimani is also out ot Brilinta and the script has been sent to [facility].  "

## 2024-01-25 ENCOUNTER — TELEPHONE (OUTPATIENT)
Dept: ANTICOAGULATION | Facility: CLINIC | Age: 79
End: 2024-01-25
Payer: COMMERCIAL

## 2024-01-25 NOTE — TELEPHONE ENCOUNTER
ANTICOAGULATION     Kimani Glover is overdue for an INR check.     Left message for patient to call and schedule lab appointment as soon as possible. If returning call, please schedule.     Malaika Urban RN

## 2024-01-26 ENCOUNTER — ANTICOAGULATION THERAPY VISIT (OUTPATIENT)
Dept: ANTICOAGULATION | Facility: CLINIC | Age: 79
End: 2024-01-26

## 2024-01-26 ENCOUNTER — LAB (OUTPATIENT)
Dept: LAB | Facility: HOSPITAL | Age: 79
End: 2024-01-26
Payer: COMMERCIAL

## 2024-01-26 DIAGNOSIS — I21.3: Primary | ICD-10-CM

## 2024-01-26 DIAGNOSIS — I21.3 ST ELEVATION MYOCARDIAL INFARCTION (STEMI), UNSPECIFIED ARTERY (H): Primary | ICD-10-CM

## 2024-01-26 DIAGNOSIS — I21.3 ST ELEVATION MYOCARDIAL INFARCTION (STEMI), UNSPECIFIED ARTERY (H): ICD-10-CM

## 2024-01-26 LAB — INR PPP: 1.58 (ref 0.85–1.15)

## 2024-01-26 PROCEDURE — 36415 COLL VENOUS BLD VENIPUNCTURE: CPT

## 2024-01-26 PROCEDURE — 85610 PROTHROMBIN TIME: CPT

## 2024-01-26 NOTE — PROGRESS NOTES
ANTICOAGULATION MANAGEMENT     Kimani Glover 78 year old male is on warfarin with subtherapeutic INR result. (Goal INR 2.0-3.0)    Recent labs: (last 7 days)     01/26/24  0931   INR 1.58*       ASSESSMENT     Source(s): Chart Review and Patient/Caregiver Call - royal Mo     Warfarin doses taken: Warfarin taken as instructed  Diet: No new diet changes identified  Medication/supplement changes: None noted  New illness, injury, or hospitalization: No  Signs or symptoms of bleeding or clotting: No  Previous result: Supratherapeutic  Additional findings: None       PLAN     Recommended plan for no diet, medication or health factor changes affecting INR     Dosing Instructions: Increase your warfarin dose (9.1% change) with next INR in 1-2 weeks       Summary  As of 1/26/2024      Full warfarin instructions:  2 mg every Mon, Thu; 4 mg all other days   Next INR check:  2/2/2024               Telephone call with Chely who verbalizes understanding and agrees to plan    Goes to walk -in lab     Education provided:   Please call back if any changes to your diet, medications or how you've been taking warfarin  Goal range and lab monitoring: goal range and significance of current result, Importance of therapeutic range, and Importance of following up at instructed interval  Symptom monitoring: monitoring for clotting signs and symptoms and monitoring for stroke signs and symptoms    Plan made per [facility] anticoagulation protocol    Tiana Moore, RN  Anticoagulation Clinic  1/26/2024    _______________________________________________________________________     Anticoagulation Episode Summary       Current INR goal:  2.0-3.0   TTR:  29.6% (4 wk)   Target end date:  Indefinite   Send INR reminders to:  [facility]    Indications    ST elevation myocardial infarction (STEMI)  unspecified artery (H) [I21.3]             Comments:               Anticoagulation Care Providers       Provider Role Specialty Phone  number    Alvino Oleary MD Referring Cardiovascular Disease [phone]

## 2024-01-26 NOTE — PROGRESS NOTES
ANTICOAGULATION MANAGEMENT     Kimani Glover 78 year old male is on warfarin with subtherapeutic INR result. (Goal INR 2.0-3.0)    Recent labs: (last 7 days)     01/26/24  0931   INR 1.58*       ASSESSMENT     Source(s): Chart Review  Previous INR was Supratherapeutic  Medication, diet, health changes since last INR - due to unstable INRs, pt would like to start a DOAC (see other anticoag episode) - it appears PA has been requested.   I consulted with Ellyn Gusman, [facility] who advised a 9% increase since last INR was high.           PLAN     Unable to reach Chely, daughter,  today.    VM left to call [facility] back. Will try again later.     Follow up required to confirm warfarin dose taken and assess for changes    Tiana Moore, RN  Anticoagulation Clinic  1/26/2024

## 2024-01-31 NOTE — PROGRESS NOTES
Chart reviewed and plan made with [facility] RN at time of encounter.    Ellyn Gusman, [facility]

## 2024-02-07 ENCOUNTER — TELEPHONE (OUTPATIENT)
Dept: ANTICOAGULATION | Facility: CLINIC | Age: 79
End: 2024-02-07
Payer: COMMERCIAL

## 2024-02-15 ENCOUNTER — TELEPHONE (OUTPATIENT)
Dept: ANTICOAGULATION | Facility: CLINIC | Age: 79
End: 2024-02-15
Payer: COMMERCIAL

## 2024-02-15 NOTE — TELEPHONE ENCOUNTER
ANTICOAGULATION     Kimani Glover is overdue for an INR check.     Reminder letter sent    Malaika Urban RN

## 2024-02-15 NOTE — LETTER
February 15, 2024      TO: Kimani Glover  [address] [place] [address]         Dear Mr. Kimani Glover,    February 15, 2024    Dear Kimani,    You are currently under the care of [facility] for your warfarin (Coumadin , Jantoven ) therapy.  We are contacting you because our records show you were due for an INR on 1/15/24.    There are potentially serious risks when taking warfarin without careful monitoring and we want to make sure you are safely managed.  Routine lab monitoring is required for warfarin refills.     Please call [phone] as soon as possible to schedule a lab appointment. If it is difficult for you to get to lab, please call us to discuss options.  If there has been a change in your care or other concerns, please let us know so we can help and/or update our records.         Sincerely,       [facility]

## 2024-03-02 ENCOUNTER — HEALTH MAINTENANCE LETTER (OUTPATIENT)
Age: 79
End: 2024-03-02

## 2024-03-06 ENCOUNTER — TELEPHONE (OUTPATIENT)
Dept: ANTICOAGULATION | Facility: CLINIC | Age: 79
End: 2024-03-06
Payer: COMMERCIAL

## 2024-03-06 NOTE — TELEPHONE ENCOUNTER
ANTICOAGULATION     Kimani Glover is overdue for an INR check.     Spoke with Felicia.  They are down to 1 car. Felicia will try to get patient into [facility] sometime this week.     Malaika Urban RN

## 2024-03-07 ENCOUNTER — LAB (OUTPATIENT)
Dept: LAB | Facility: HOSPITAL | Age: 79
End: 2024-03-07
Payer: COMMERCIAL

## 2024-03-07 ENCOUNTER — ANTICOAGULATION THERAPY VISIT (OUTPATIENT)
Dept: ANTICOAGULATION | Facility: CLINIC | Age: 79
End: 2024-03-07

## 2024-03-07 DIAGNOSIS — I21.3: Primary | ICD-10-CM

## 2024-03-07 DIAGNOSIS — I21.3 ST ELEVATION MYOCARDIAL INFARCTION (STEMI), UNSPECIFIED ARTERY (H): Primary | ICD-10-CM

## 2024-03-07 LAB — INR PPP: 1.11 (ref 0.85–1.15)

## 2024-03-07 PROCEDURE — 85610 PROTHROMBIN TIME: CPT

## 2024-03-07 PROCEDURE — 36415 COLL VENOUS BLD VENIPUNCTURE: CPT

## 2024-03-07 NOTE — PROGRESS NOTES
ANTICOAGULATION MANAGEMENT     Kimani Glover 78 year old male is on warfarin with subtherapeutic INR result. (Goal INR 2.0-3.0)    Recent labs: (last 7 days)     03/07/24  0830   INR 1.11       ASSESSMENT     Source(s): Chart Review and Patient/Caregiver Call     Warfarin doses taken: Warfarin taken as instructed  Diet: No new diet changes identified  Medication/supplement changes: None noted  New illness, injury, or hospitalization: No  Signs or symptoms of bleeding or clotting: No  Previous result: Subtherapeutic  Additional findings:  No identifiable reason INR is low today.  Felicia fills pill box and reports that all pills are gone.        PLAN     Recommended plan for no diet, medication or health factor changes affecting INR     Dosing Instructions: Increase your warfarin dose (16% change) with next INR in 1 week       Summary  As of 3/7/2024      Full warfarin instructions:  3/7: 8 mg; Otherwise 4 mg every day   Next INR check:  3/14/2024               Telephone call with Felicia who verbalizes understanding and agrees to plan and who agrees to plan and repeated back plan correctly    Patient [facility] to [facility] in [facility]    Education provided:   Taking warfarin: Importance of taking warfarin as instructed  Goal range and lab monitoring: goal range and significance of current result, Importance of therapeutic range, and Importance of following up at instructed interval    Plan made per [facility] anticoagulation protocol    Malaika Urban RN  Anticoagulation Clinic  3/7/2024    _______________________________________________________________________     Anticoagulation Episode Summary       Current INR goal:  2.0-3.0   TTR:  12.1% (2.3 mo)   Target end date:  Indefinite   Send INR reminders to:  [facility]    Indications    ST elevation myocardial infarction (STEMI)  unspecified artery (H) [I21.3]             Comments:               Anticoagulation Care Providers       Provider Role Specialty  Phone number    Alvino Oleary MD Referring Cardiovascular Disease [phone]

## 2024-03-21 ENCOUNTER — ANTICOAGULATION THERAPY VISIT (OUTPATIENT)
Dept: ANTICOAGULATION | Facility: CLINIC | Age: 79
End: 2024-03-21

## 2024-03-21 ENCOUNTER — LAB (OUTPATIENT)
Dept: LAB | Facility: HOSPITAL | Age: 79
End: 2024-03-21
Payer: COMMERCIAL

## 2024-03-21 DIAGNOSIS — I21.3 ST ELEVATION MYOCARDIAL INFARCTION (STEMI), UNSPECIFIED ARTERY (H): Primary | ICD-10-CM

## 2024-03-21 LAB — INR PPP: 2.09 (ref 0.85–1.15)

## 2024-03-21 PROCEDURE — 36415 COLL VENOUS BLD VENIPUNCTURE: CPT

## 2024-03-21 PROCEDURE — 85610 PROTHROMBIN TIME: CPT

## 2024-03-21 NOTE — PROGRESS NOTES
"ANTICOAGULATION MANAGEMENT     Kimani Glover 78 year old male is on warfarin with therapeutic INR result. (Goal INR 2.0-3.0)    Recent labs: (last 7 days)     03/21/24  1034   INR 2.09*       ASSESSMENT     Source(s): Chart Review and Patient/Caregiver Call     Warfarin doses taken: Less warfarin taken than planned which may be affecting INR.  Chelyalissa Harman has been taking warfarin 2 mg TuTh and 4 mg ROW for the last two weeks  Diet: No new diet changes identified  Medication/supplement changes: None noted  New illness, injury, or hospitalization: No  Signs or symptoms of bleeding or clotting: Yes: Kimani report \"dark stool\" about two weeks ago; no dark stool since then.  Instructed that if patient has black stool, he needs to be seen urgently. Chely verbalizes an understanding of this.  He does not have SOB, dizziness, light headedness  Previous result: Subtherapeutic  Additional findings: None       PLAN     Recommended plan for no diet, medication or health factor changes affecting INR     Dosing Instructions: Continue your current warfarin dose with next INR in 1 week --this is the warfarin dose Chelyalissa Harman has been taking for the last two weeks      Summary  As of 3/21/2024      Full warfarin instructions:  2 mg every Tue, Thu; 4 mg all other days   Next INR check:  3/28/2024               Telephone call with ARGELIA Mo who agrees to plan and repeated back plan correctly    Uses [facility]    Education provided:   Symptom monitoring: monitoring for bleeding signs and symptoms and when to seek medical attention/emergency care    Plan made per [facility] anticoagulation protocol    Calli Ray RN  Anticoagulation Clinic  3/21/2024    _______________________________________________________________________     Anticoagulation Episode Summary       Current INR goal:  2.0-3.0   TTR:  11.6% (2.8 mo)   Target end date:  Indefinite   Send INR reminders to:  [facility]    " Indications    ST elevation myocardial infarction (STEMI)  unspecified artery (H) [I21.3]             Comments:               Anticoagulation Care Providers       Provider Role Specialty Phone number    Alvino Oleary MD Referring Cardiovascular Disease [phone]

## 2024-03-22 DIAGNOSIS — I21.3 ST ELEVATION MYOCARDIAL INFARCTION (STEMI), UNSPECIFIED ARTERY (H): ICD-10-CM

## 2024-03-22 RX ORDER — WARFARIN SODIUM 4 MG/1
TABLET ORAL
Qty: 90 TABLET | Refills: 1 | Status: SHIPPED | OUTPATIENT
Start: 2024-03-22 | End: 2024-06-12

## 2024-03-22 NOTE — TELEPHONE ENCOUNTER
ANTICOAGULATION MANAGEMENT:  Medication Refill    Anticoagulation Summary  As of 3/21/2024      Warfarin maintenance plan:  2 mg (4 mg x 0.5) every Tue, Thu; 4 mg (4 mg x 1) all other days   Next INR check:  3/28/2024   Target end date:  Indefinite    Indications    ST elevation myocardial infarction (STEMI)  unspecified artery (H) [I21.3]                 Anticoagulation Care Providers       Provider Role Specialty Phone number    Alvino Oleary MD Referring Cardiovascular Disease [phone]            Refill Criteria    Visit with referring provider/group: Meets criteria: office visit within referring provider group in the last 1 year on 8/7/23    [facility] referral last signed: 12/19/2023; within last year: Yes    Lab monitoring not exceeding 2 weeks overdue: Yes    Kimani meets all criteria for refill. Rx instructions and quantity supplied updated to match patient's current dosing plan. Warfarin 90 day supply with 1 refill granted per [facility] protocol     MACHO NIETO RN  Anticoagulation Clinic

## 2024-04-01 ENCOUNTER — MYC MEDICAL ADVICE (OUTPATIENT)
Dept: ANTICOAGULATION | Facility: CLINIC | Age: 79
End: 2024-04-01
Payer: COMMERCIAL

## 2024-04-03 ENCOUNTER — LAB (OUTPATIENT)
Dept: LAB | Facility: HOSPITAL | Age: 79
End: 2024-04-03
Payer: COMMERCIAL

## 2024-04-03 ENCOUNTER — ANTICOAGULATION THERAPY VISIT (OUTPATIENT)
Dept: ANTICOAGULATION | Facility: CLINIC | Age: 79
End: 2024-04-03

## 2024-04-03 DIAGNOSIS — I21.3 ST ELEVATION MYOCARDIAL INFARCTION (STEMI), UNSPECIFIED ARTERY (H): ICD-10-CM

## 2024-04-03 DIAGNOSIS — I21.3: Primary | ICD-10-CM

## 2024-04-03 DIAGNOSIS — I21.3 ST ELEVATION MYOCARDIAL INFARCTION (STEMI), UNSPECIFIED ARTERY (H): Primary | ICD-10-CM

## 2024-04-03 LAB — INR PPP: 1.22 (ref 0.85–1.15)

## 2024-04-03 PROCEDURE — 36415 COLL VENOUS BLD VENIPUNCTURE: CPT

## 2024-04-03 PROCEDURE — 85610 PROTHROMBIN TIME: CPT

## 2024-04-03 NOTE — PROGRESS NOTES
ANTICOAGULATION MANAGEMENT     Kimani Glover 78 year old male is on warfarin with subtherapeutic INR result. (Goal INR 2.0-3.0)    Recent labs: (last 7 days)     04/03/24  0957   INR 1.22*       ASSESSMENT     Source(s): Chart Review and Patient/Caregiver Call     Warfarin doses taken: Less warfarin taken than planned which may be affecting INR  Diet: No new diet changes identified  Medication/supplement changes: None noted  New illness, injury, or hospitalization: No  Signs or symptoms of bleeding or clotting: No  Previous result: Therapeutic last visit; previously outside of goal range  Additional findings:  Chely STOUT states patient has been complaining of constipation and feeling uncomfortable when he is taking higher dose of Warfarin and has refused to take a full tablet daily. She states that they would ideally like him to get back onto a DOAC and she will contact his insurance to find out options.    INR was therapeutic last check on 24 mg/week. Going forward, Chely will encourage patient to alternate 4 mg and 2 mg, every other day (~21mg/wk) and will check another INR by the end of next week (encouraged INR recheck within 1 week). She will update [facility] on what she finds out about pricing for other blood thinners.  Encouraged monitoring of stools as black stool could be sign of a GI bleed. Chely verbalized understanding.      PLAN     Recommended plan for temporary change(s) affecting INR     Dosing Instructions: Increase your warfarin dose (50% change from what you have been taking) with next INR in 5-7 days       Summary  As of 4/3/2024      Full warfarin instructions:  4 mg, then 2 mg repeating every 2 days   Next INR check:  4/12/2024               Telephone call with Chely STOUT who agrees to plan and repeated back plan correctly    Patient offered & declined to schedule next visit    Education provided:   Taking warfarin: Importance of taking warfarin as instructed  Goal range and lab  monitoring: goal range and significance of current result, Importance of therapeutic range, and Importance of following up at instructed interval    Plan made per [facility] anticoagulation protocol    Betsey Zamarripa, RN  Anticoagulation Clinic  4/3/2024    _______________________________________________________________________     Anticoagulation Episode Summary       Current INR goal:  2.0-3.0   TTR:  11.4% (3.2 mo)   Target end date:  Indefinite   Send INR reminders to:  [facility]    Indications    ST elevation myocardial infarction (STEMI)  unspecified artery (H) [I21.3]             Comments:               Anticoagulation Care Providers       Provider Role Specialty Phone number    Alvino Oleary MD Referring Cardiovascular Disease [phone]

## 2024-04-24 ENCOUNTER — MYC MEDICAL ADVICE (OUTPATIENT)
Dept: ANTICOAGULATION | Facility: CLINIC | Age: 79
End: 2024-04-24
Payer: COMMERCIAL

## 2024-05-01 ENCOUNTER — TELEPHONE (OUTPATIENT)
Dept: ANTICOAGULATION | Facility: CLINIC | Age: 79
End: 2024-05-01
Payer: COMMERCIAL

## 2024-05-01 NOTE — TELEPHONE ENCOUNTER
ANTICOAGULATION     Kimani Glover is overdue for an INR check.     Message is left on Chely's phone.     Malaika Urban RN

## 2024-05-07 ENCOUNTER — TELEPHONE (OUTPATIENT)
Dept: CARDIOLOGY | Facility: CLINIC | Age: 79
End: 2024-05-07
Payer: COMMERCIAL

## 2024-05-07 NOTE — TELEPHONE ENCOUNTER
Left Voicemail (1st Attempt) for the patient to call back and schedule the following:    Appointment type:  rtn critical care   Provider: andre   Return date: 08/07/24  Specialty phone number: [phone] opt 1   Additional appointment(s) needed: labs   Additonal Notes: n/a

## 2024-05-08 ENCOUNTER — TELEPHONE (OUTPATIENT)
Dept: ANTICOAGULATION | Facility: CLINIC | Age: 79
End: 2024-05-08
Payer: COMMERCIAL

## 2024-05-08 NOTE — TELEPHONE ENCOUNTER
ANTICOAGULATION     Kimani Glover is overdue for an INR check.     My chart message was sent    Malaika Urban RN

## 2024-05-09 ENCOUNTER — LAB (OUTPATIENT)
Dept: LAB | Facility: HOSPITAL | Age: 79
End: 2024-05-09
Payer: COMMERCIAL

## 2024-05-09 ENCOUNTER — ANTICOAGULATION THERAPY VISIT (OUTPATIENT)
Dept: ANTICOAGULATION | Facility: CLINIC | Age: 79
End: 2024-05-09

## 2024-05-09 DIAGNOSIS — I21.3 ST ELEVATION MYOCARDIAL INFARCTION (STEMI), UNSPECIFIED ARTERY (H): Primary | ICD-10-CM

## 2024-05-09 LAB — INR PPP: 1.33 (ref 0.85–1.15)

## 2024-05-09 PROCEDURE — 85610 PROTHROMBIN TIME: CPT

## 2024-05-09 PROCEDURE — 36415 COLL VENOUS BLD VENIPUNCTURE: CPT

## 2024-05-09 NOTE — PROGRESS NOTES
ANTICOAGULATION MANAGEMENT     Kimani Glover 78 year old male is on warfarin with subtherapeutic INR result. (Goal INR 2.0-3.0)    Recent labs: (last 7 days)     05/09/24  0948   INR 1.33*       ASSESSMENT     Source(s): Chart Review  Previous INR was Subtherapeutic  Medication, diet, health changes since last INR chart reviewed; none identified         PLAN     Unable to reach Chely today.    Left message to take a booster dose of warfarin,  6 mg tonight. Request call back for assessment. Also sent [employer] message for assessment.     Follow up required to confirm warfarin dose taken and assess for changes    Betsey Zamarripa RN  Anticoagulation Clinic  5/9/2024

## 2024-05-10 NOTE — PROGRESS NOTES
Writer leaves another message for Andreina.  Writer requests that patient return call to verify dosing and to ensure there were no missed doses.

## 2024-05-21 ENCOUNTER — TELEPHONE (OUTPATIENT)
Dept: ANTICOAGULATION | Facility: CLINIC | Age: 79
End: 2024-05-21
Payer: COMMERCIAL

## 2024-06-05 ENCOUNTER — TELEPHONE (OUTPATIENT)
Dept: CARDIOLOGY | Facility: CLINIC | Age: 79
End: 2024-06-05
Payer: COMMERCIAL

## 2024-06-05 ENCOUNTER — TELEPHONE (OUTPATIENT)
Dept: ANTICOAGULATION | Facility: CLINIC | Age: 79
End: 2024-06-05

## 2024-06-05 DIAGNOSIS — R00.1 BRADYCARDIA: Primary | ICD-10-CM

## 2024-06-05 DIAGNOSIS — I48.91 ATRIAL FIBRILLATION (H): ICD-10-CM

## 2024-06-05 NOTE — TELEPHONE ENCOUNTER
Spoke with Chely. Patient is not taking warfarin as directed.  Patient is only taking warfarin 2.5mg daily and will not take any larger dose because in his mind he doesn't feel well taking a larger dose.  Chely has tried to explain to him that it's not beneficial to take a dose that is not effective for him. Chely is going to call his insurance company and find out if another anticoagulant would be covered as an alternative to warfarin.

## 2024-06-05 NOTE — TELEPHONE ENCOUNTER
[facility] Call Center    Phone Message    May a detailed message be left on voicemail: yes     Reason for Call: Other: Felicia discussed trying a new medication with the anti-coag clinic today.  Jantoven 4 mg 30 for  30 is covered by insurance .  Please call to discuss     Action Taken: Other: cardio    Travel Screening: Not Applicable    Thank you!  [facility]

## 2024-06-05 NOTE — TELEPHONE ENCOUNTER
Pricing for DOACs:    Cassidy Gomes  You11 minutes ago (12:55 PM)     MM  Hi!  Not great news.  Eliquis $347 for 30 days  Xarrlto $347 for 30 days    Meeting a deductible then the amount will be $47 after the deductible is met.    Let me know if you need a voucher for either, can't use a copay card on Medicare.    Cassidy

## 2024-06-07 ENCOUNTER — TELEPHONE (OUTPATIENT)
Dept: CARDIOLOGY | Facility: CLINIC | Age: 79
End: 2024-06-07
Payer: COMMERCIAL

## 2024-06-07 NOTE — TELEPHONE ENCOUNTER
6/7/2024 11:59AM Lizzeth Brady  Patient aware of scheduled appointment:  Date: 6/12/2024  Time: 10AM  Visit type: Nurse Only (ZIOPATCH)  Provider: [facility] NURSE  Location: [address], [address]rd floor, [place] [address]  Testing/imaging: NA  Additional notes: 6/7 Scheduled Nurse Only [facility] nurse visit for ziopatch placement 6/12 in held spot at 10AM- removed hold. MIRACLE Brady 6/7/2024 11:59AM

## 2024-06-07 NOTE — TELEPHONE ENCOUNTER
----- Message from Ainsley Bone RN sent at 6/7/2024 11:39 AM CDT -----  Regarding: RE: ZIO HOLD 6/12 at 10AM  No ginette, I messaged Emerald already. I think I'll just put in another order.   Beth  ----- Message -----  From: Lizzeth Brady  Sent: 6/7/2024  11:34 AM CDT  To: Ainsley Bone RN; [facility] Mckenzie V  Subject: RE: ZIO HOLD 6/12 at 10AM                        No I was not. I will include Emerald Mckenzie to see if she can set the order to active requests- I haven't been able to schedule since the order went directly to finalized requests. Spot is still on hold on 6/12. Sorry!    Lizzeth Brady  ----- Message -----  From: Ainsley Bone RN  Sent: 6/7/2024   9:08 AM CDT  To: Lizzeth Brady  Subject: RE: ZIO HOLD 6/12 at 10AM                        Beau Moreau,  Were you able to get this gentleman scheduled? Should I re order the monitor.  Beth  ----- Message -----  From: Lizzeth Brady  Sent: 6/5/2024   6:00 PM CDT  To: Ainsley Bone RN  Subject: ZIO HOLD 6/12 at 10AM                            Heading out for the night. Let me know if you're able to get the zio into active requests and I will schedule zio in the held spot tomorrow.    Thanks,    Lizzeth Brady

## 2024-06-12 ENCOUNTER — ALLIED HEALTH/NURSE VISIT (OUTPATIENT)
Dept: CARDIOLOGY | Facility: CLINIC | Age: 79
End: 2024-06-12
Attending: INTERNAL MEDICINE
Payer: COMMERCIAL

## 2024-06-12 ENCOUNTER — TELEPHONE (OUTPATIENT)
Dept: ANTICOAGULATION | Facility: CLINIC | Age: 79
End: 2024-06-12

## 2024-06-12 DIAGNOSIS — R00.1 BRADYCARDIA: ICD-10-CM

## 2024-06-12 DIAGNOSIS — Z51.81 ENCOUNTER FOR THERAPEUTIC DRUG MONITORING: ICD-10-CM

## 2024-06-12 DIAGNOSIS — I21.3 ST ELEVATION MYOCARDIAL INFARCTION (STEMI), UNSPECIFIED ARTERY (H): Primary | ICD-10-CM

## 2024-06-12 DIAGNOSIS — I48.91 ATRIAL FIBRILLATION (H): ICD-10-CM

## 2024-06-12 PROCEDURE — 93246 EXT ECG>7D<15D RECORDING: CPT

## 2024-06-12 RX ORDER — WARFARIN SODIUM 4 MG/1
TABLET ORAL
Qty: 25 TABLET | Refills: 0 | Status: SHIPPED | OUTPATIENT
Start: 2024-06-12

## 2024-06-12 NOTE — TELEPHONE ENCOUNTER
ANTICOAGULATION MANAGEMENT:  Medication Refill    Anticoagulation Summary  As of 5/9/2024      Warfarin maintenance plan:  4 mg (4 mg x 1), then 2 mg (4 mg x 0.5) repeating every 2 days   Next INR check:  5/17/2024   Target end date:  Indefinite    Indications    ST elevation myocardial infarction (STEMI)  unspecified artery (H) [I21.3]                 Anticoagulation Care Providers       Provider Role Specialty Phone number    Alvino Oleary MD Referring Cardiovascular Disease [phone]            Refill Criteria    Visit with referring provider/group: Meets criteria: office visit within referring provider group in the last 1 year on 8/7/23    [facility] referral last signed: 12/19/2023; within last year: Yes    Lab monitoring not exceeding 2 weeks overdue: No    Kimani does NOT meet all criteria for refill: > 2 weeks overdue for lab monitoring . 30 day gabby fill approved; patient notified to schedule labs per [facility] protocol    Bethany Peña RN  Anticoagulation Clinic

## 2024-06-12 NOTE — TELEPHONE ENCOUNTER
"I had a long talk with Kimani's daughter-in-law Felicia today. She said he was out of his Warfarin and that he needed a refill. Because he has been noncompliant with INR monitoring >2 weeks, I was only able to send for a 30-day gabby fill per our protocol. She said that for the last 30 days at least, that he has been taking less Warfarin than recommended by our clinic, that he has only been willing to take a half blue tab (2 mg) daily, said that if he takes a full pill (4 mg), claims his poop is black, has nose bleeds, but then said that he tends to be \"full of shit\" too so it is hard to know if this is just \"him being grumpy,\" and that he is \"very stubborn,\" \"miguel losing his mind\" (she confirms slow progression as with short-term memory loss), and that he \"feels better than he ever has\" and that she feels it is pointless to have his INR checked if he is not willing to take more than a half tablet. (His INR has been low for a while and we have tried to instruct to take more Warfarin than this). She said that he would not be willing to do Lovenox shots and that he needs more education on the necessity of anticoagulation/adjusting Warfarin dose/regular INR checks. When I asked if I could try to educate him, she said it would be better if his Doctor could do this because he is hard of hearing and wouldn't be able to hear me over the phone. She said that he had a Ziopatch placed today to \"check on his A Fibb,\" and that in 2 weeks they will know \"if he needs to be on anticoagulation or not.\" She said that they cannot afford to pay for Eliquis/Xarelto out of pocket, and that if he does need continued anticoagulation, that she is trying to get him approved for medical assistance to see if this could cover an alternative to Warfarin/Jantoven/Coumadin (such as Eliquis or Xarelto) better. Could someone reach out to Felicia to see if an appointment could be scheduled to go over options/provide further education?    Thank " you,    Bethany Peña RN  Anticoagulation Clinic

## 2024-06-12 NOTE — NURSING NOTE
Kimani Glover arrived here on 6/12/2024 10:18 AM for 8-14 Days  Zio monitor placement per ordering provider Dr. Oleary for the diagnosis Bradycardia.  Patient s skin was prepped per protocol.  Zio monitor was placed.  Instructions were reviewed with and given to the patient.  Patient verbalized understanding of wear, troubleshooting and monitor return instructions.

## 2024-06-13 ENCOUNTER — TELEPHONE (OUTPATIENT)
Dept: CARDIOLOGY | Facility: CLINIC | Age: 79
End: 2024-06-13
Payer: COMMERCIAL

## 2024-06-13 DIAGNOSIS — I21.3 ST ELEVATION MYOCARDIAL INFARCTION (STEMI), UNSPECIFIED ARTERY (H): ICD-10-CM

## 2024-06-13 DIAGNOSIS — E78.5 HYPERLIPIDEMIA: Primary | ICD-10-CM

## 2024-06-18 ENCOUNTER — TELEPHONE (OUTPATIENT)
Dept: CARDIOLOGY | Facility: CLINIC | Age: 79
End: 2024-06-18
Payer: COMMERCIAL

## 2024-06-18 DIAGNOSIS — I21.3 ST ELEVATION MYOCARDIAL INFARCTION (STEMI), UNSPECIFIED ARTERY (H): ICD-10-CM

## 2024-06-18 DIAGNOSIS — I24.9 ACS (ACUTE CORONARY SYNDROME) (H): ICD-10-CM

## 2024-06-18 RX ORDER — METOPROLOL SUCCINATE 25 MG/1
25 TABLET, EXTENDED RELEASE ORAL DAILY
Qty: 90 TABLET | Refills: 3 | Status: SHIPPED | OUTPATIENT
Start: 2024-06-18

## 2024-06-18 NOTE — TELEPHONE ENCOUNTER
Chely said they were out of refills and wanted to know if Kimani should continue to take his metoprolol. I said yes please continue, refill sent to pharmacy. No other questions at this time.

## 2024-06-18 NOTE — TELEPHONE ENCOUNTER
Health Call Center    Phone Message    May a detailed message be left on voicemail: yes     Reason for Call: Medication Question or concern regarding medication   Prescription Clarification  Name of Medication: metoprolol succinate ER (TOPROL XL) 25 MG 24 hr tablet   Prescribing Provider: Matthew   Pharmacy:   [id number] [place], [place] - [address] AT [facility] [address]      What on the order needs clarification? Daughter-in-law called on behalf of the patient requesting to speak with a member of his care team. Would like to know if the patient should be taking this medication. Please call back to further discuss.    Action Taken: Message routed to:  Other: Cardiology    Travel Screening: Not Applicable     Thank you!  [facility]

## 2024-06-21 ENCOUNTER — TELEPHONE (OUTPATIENT)
Dept: CARDIOLOGY | Facility: CLINIC | Age: 79
End: 2024-06-21
Payer: COMMERCIAL

## 2024-06-21 DIAGNOSIS — I21.3 ST ELEVATION MYOCARDIAL INFARCTION (STEMI), UNSPECIFIED ARTERY (H): ICD-10-CM

## 2024-06-21 DIAGNOSIS — E78.5 HYPERLIPIDEMIA, UNSPECIFIED HYPERLIPIDEMIA TYPE: ICD-10-CM

## 2024-06-21 DIAGNOSIS — I24.9 ACS (ACUTE CORONARY SYNDROME) (H): ICD-10-CM

## 2024-06-21 RX ORDER — ATORVASTATIN CALCIUM 80 MG/1
80 TABLET, FILM COATED ORAL EVERY EVENING
Qty: 90 TABLET | Refills: 3 | Status: SHIPPED | OUTPATIENT
Start: 2024-06-21

## 2024-06-21 NOTE — TELEPHONE ENCOUNTER
[facility] Call [facility]    Phone Message    May a detailed message be left on voicemail: yes     Reason for Call: Medication Refill Request    Has the patient contacted the pharmacy for the refill? Yes   Name of medication being requested: Atorvastatin 80mg  Provider who prescribed the medication: Dr. Oleary  Pharmacy: [facility]   Date medication is needed: 06/21/2024     Pt will be out of this after today  Action Taken: Other: Cardio    Travel Screening: Not Applicable     Date of Service:

## 2024-06-27 ENCOUNTER — MYC MEDICAL ADVICE (OUTPATIENT)
Dept: ANTICOAGULATION | Facility: CLINIC | Age: 79
End: 2024-06-27
Payer: COMMERCIAL

## 2024-07-01 PROCEDURE — 93248 EXT ECG>7D<15D REV&INTERPJ: CPT | Performed by: INTERNAL MEDICINE

## 2024-07-02 ENCOUNTER — ANCILLARY PROCEDURE (OUTPATIENT)
Dept: CARDIOLOGY | Facility: CLINIC | Age: 79
End: 2024-07-02
Attending: INTERNAL MEDICINE
Payer: COMMERCIAL

## 2024-07-02 DIAGNOSIS — I21.3 ST ELEVATION MYOCARDIAL INFARCTION (STEMI), UNSPECIFIED ARTERY (H): ICD-10-CM

## 2024-07-02 DIAGNOSIS — E78.5 HYPERLIPIDEMIA: ICD-10-CM

## 2024-07-02 LAB — LVEF ECHO: NORMAL

## 2024-07-02 PROCEDURE — 93306 TTE W/DOPPLER COMPLETE: CPT | Performed by: INTERNAL MEDICINE

## 2024-07-02 PROCEDURE — 99207 PR STATISTIC IV PUSH SINGLE INITIAL SUBSTANCE: CPT | Performed by: INTERNAL MEDICINE

## 2024-07-02 RX ADMIN — Medication 5 ML: at 10:17

## 2024-07-16 ENCOUNTER — DOCUMENTATION ONLY (OUTPATIENT)
Dept: ANTICOAGULATION | Facility: CLINIC | Age: 79
End: 2024-07-16
Payer: COMMERCIAL

## 2024-07-16 NOTE — LETTER
[facility]  [address] [address]  Phone: [phone]  Fax: [phone]   July 16, 2024        Kimani Glover  [facility] [place] [address]            Dear Kimani,    You are currently under the care of [facility] for your warfarin (Coumadin , Jantoven ) therapy.  We are contacting you because our records show you were due for an INR on 5/17/2024.    There are potentially serious risks when taking warfarin without careful monitoring and we want to make sure you are safely managed.  Routine lab monitoring is required for warfarin refills.     Please call [phone] as soon as possible to schedule a lab appointment. If it is difficult for you to get to lab, please call us to discuss options.  If there has been a change in your care or other concerns, please let us know so we can help and/or update our records.         Sincerely,       [facility]

## 2024-07-16 NOTE — PROGRESS NOTES
ANTICOAGULATION     Kimani Glover is overdue for an INR check.     Reminder letter sent    Allison Carroll RN

## 2024-08-04 DIAGNOSIS — I24.9 ACS (ACUTE CORONARY SYNDROME) (H): ICD-10-CM

## 2024-08-04 DIAGNOSIS — I10 PRIMARY HYPERTENSION: ICD-10-CM

## 2024-08-04 DIAGNOSIS — I21.3 ST ELEVATION MYOCARDIAL INFARCTION (STEMI), UNSPECIFIED ARTERY (H): ICD-10-CM

## 2024-08-07 ENCOUNTER — TELEPHONE (OUTPATIENT)
Dept: ANTICOAGULATION | Facility: CLINIC | Age: 79
End: 2024-08-07
Payer: COMMERCIAL

## 2024-08-07 NOTE — TELEPHONE ENCOUNTER
Anticoagulation Clinic Notification    Kimani, is past due for an INR. Their last result was 1.33 on 5/9/24 and was due to come back on 5/17/2024.    he received phone calls and letters over the last several weeks in attempt to arrange follow up labs. Kimani Glover will be contacted again today.     Please contact patient directly to discuss compliance with monitoring or schedule visit to review ongoing anticoagulation therapy.    Thank you,     [facility]

## 2024-08-07 NOTE — LETTER
[facility]  [address] [address]  Phone: [phone]  Fax: [phone]   August 7, 2024        Kimani Glover  [facility] [place] [address]            Dear Kimani,    You are currently under the care of [facility] for your warfarin (Coumadin , Jantoven ) therapy.  We are contacting you because our records show you were due for an INR on 5/17/24.    There are potentially serious risks when taking warfarin without careful monitoring and we want to make sure you are safely managed.  Routine lab monitoring is required for warfarin refills.     Please call [phone] as soon as possible to schedule a lab appointment. If it is difficult for you to get to lab, please call us to discuss options.  If there has been a change in your care or other concerns, please let us know so we can help and/or update our records.         Sincerely,       [facility]

## 2024-08-09 DIAGNOSIS — I10 PRIMARY HYPERTENSION: ICD-10-CM

## 2024-08-09 DIAGNOSIS — I24.9 ACS (ACUTE CORONARY SYNDROME) (H): ICD-10-CM

## 2024-08-09 DIAGNOSIS — I21.3 ST ELEVATION MYOCARDIAL INFARCTION (STEMI), UNSPECIFIED ARTERY (H): ICD-10-CM

## 2024-08-09 RX ORDER — LISINOPRIL 10 MG/1
10 TABLET ORAL DAILY
Qty: 30 TABLET | Refills: 0 | Status: SHIPPED | OUTPATIENT
Start: 2024-08-09 | End: 2024-09-18

## 2024-08-09 NOTE — TELEPHONE ENCOUNTER
LISINOPRIL 10MG TABLETS     Last Written Prescription Date:  8/7/23  Last Fill Quantity: 90,   # refills: 3  Last Office Visit : 8/7/23  Future Office visit:  None     30 day gabby refill sent to the pharmacy - including instructions for patient to call the clinic and schedule an appointment.    Last seen 8/7/23. Labs June 2023  GFR Estimate   Date Value Ref Range Status   06/21/2023 78 >60 mL/min/1.73m2 Final   08/14/2020 >60 >60 mL/min/1.73m2 Final     GFR, ESTIMATED POCT   Date Value Ref Range Status   09/02/2022 >60 >60 mL/min/1.73m2 Final      Creatinine   Date Value Ref Range Status   06/21/2023 0.98 0.67 - 1.17 mg/dL Final

## 2024-08-14 RX ORDER — LISINOPRIL 10 MG/1
10 TABLET ORAL DAILY
Qty: 90 TABLET | OUTPATIENT
Start: 2024-08-14

## 2024-08-14 NOTE — TELEPHONE ENCOUNTER
lisinopril (ZESTRIL) 10 MG tablet 30 tablet 0 8/9/2024       Should have refills on file. Pharmacy sent message. Rx refill denied    Hyacinth Shook RN  P Red Flag Triage/MRT

## 2024-08-20 ENCOUNTER — HOSPITAL ENCOUNTER (OUTPATIENT)
Dept: CT IMAGING | Facility: HOSPITAL | Age: 79
Discharge: HOME | End: 2024-08-20
Payer: COMMERCIAL

## 2024-08-20 ENCOUNTER — HOSPITAL ENCOUNTER (OUTPATIENT)
Dept: GENERAL RADIOLOGY | Facility: HOSPITAL | Age: 79
Discharge: HOME | End: 2024-08-20
Payer: COMMERCIAL

## 2024-08-20 ENCOUNTER — OFFICE VISIT (OUTPATIENT)
Dept: FAMILY MEDICINE | Facility: CLINIC | Age: 79
End: 2024-08-20
Payer: COMMERCIAL

## 2024-08-20 VITALS
TEMPERATURE: 98.1 F | RESPIRATION RATE: 16 BRPM | DIASTOLIC BLOOD PRESSURE: 83 MMHG | SYSTOLIC BLOOD PRESSURE: 138 MMHG | HEART RATE: 90 BPM | OXYGEN SATURATION: 96 %

## 2024-08-20 DIAGNOSIS — S01.80XA OPEN WOUND OF FACE, INITIAL ENCOUNTER: ICD-10-CM

## 2024-08-20 DIAGNOSIS — Z23 NEED FOR TDAP VACCINATION: ICD-10-CM

## 2024-08-20 DIAGNOSIS — S09.90XA TRAUMATIC INJURY OF HEAD, INITIAL ENCOUNTER: Primary | ICD-10-CM

## 2024-08-20 DIAGNOSIS — S00.81XA ABRASION OF FACE, INITIAL ENCOUNTER: ICD-10-CM

## 2024-08-20 DIAGNOSIS — M54.2 NECK PAIN: ICD-10-CM

## 2024-08-20 PROCEDURE — 90715 TDAP VACCINE 7 YRS/> IM: CPT

## 2024-08-20 PROCEDURE — 70450 CT HEAD/BRAIN W/O DYE: CPT

## 2024-08-20 PROCEDURE — 72040 X-RAY EXAM NECK SPINE 2-3 VW: CPT

## 2024-08-20 PROCEDURE — 99203 OFFICE O/P NEW LOW 30 MIN: CPT | Mod: 25

## 2024-08-20 PROCEDURE — 90471 IMMUNIZATION ADMIN: CPT

## 2024-08-20 PROCEDURE — 12001 RPR S/N/AX/GEN/TRNK 2.5CM/<: CPT

## 2024-08-20 NOTE — PROGRESS NOTES
SUBJECTIVE:   78 year old male sustained laceration of face 2 hours ago. Nature of injury: Tripped in driveway, hit face on pavement, head bounced back a bit. Tetanus vaccination status reviewed: Td vaccination indicated and given today.     OBJECTIVE:   Patient appears well, vitals are normal. Large abrasion to L face - 3 cm tall and 2 cm wide over and including L eyebrow, 1x2 cm below eye on cheek. Lacerations 1 cm x2 noted above eyebrow without hemostasis.  Description: ragged edges, skin loss through most. Neurovascular and tendon structures are intact.    ASSESSMENT:   Laceration as described.    PLAN:   Anesthesia with Lidocaine 1% with epinephrine. Wound cleansed, debrided of visible foreign material and necrotic tissue, and sutured. Dressing applied.  Wound care instructions provided.  Observe for any signs of infection or other problems.  Return for suture removal in 5 days.    Traumatic injury of head, initial encounter  Neck pain  Open wound of face, initial encounter  Feel and hit face, head bounced up, was on warfarin until a couple weeks ago. So will get head CT for elderly head trauma on anticoagulation and will get C-spine given the report that his head bounced up. As far as the wound, will do non-stick dressings for the abraded portion and placed 3 ethilon 5-0 sutures on the two small forehead abrasions that were not hemostatic  - CT Head w/o Contrast - no acute intracranial process  - XR Cervical Spine 2/3 Views - no acute process  - Less than 2.5 cm in length

## 2024-08-20 NOTE — PATIENT INSTRUCTIONS
INSTRUCTIONS TO PATIENTS AND RELATIVES REGARDING LACERATIONS    These instructions are for persons who have had lacerations (jagged or deep wounds or cuts) that require special bandaging and/or stitches.    1.  DO NOT REMOVE the bandage applied by the nurse or physician for 24 hours unless you have received other instructions from you physician.    AFTER 24 HOURS:    2.  KEEP AREA CLEAN, DRY AND COVERED.        *  Clean abrasions and lacerations without stitches 1 -2 times a day with soap and water.      *  DO NO  SOAK or IMMERSE LACERATION in water for a prolonged length of time such as bathing, swimming or washing dishes.      *  APPLY ANTIBIOTIC OINTMENT and a clean bandage.      DO NOT WASH AREAS WITH STITCHS OR ALLOW STITICHS TO BECOME WET. KEEP AREA CLEAN, DRY AND COVERED.      FACIAL AND SCALP LACERAATIONS DO NOT NEED A BANDAGE    3.  TREAT PAIN OR DISCOMFORT WITH:      *  Ibuprofen or Tylenol every four hours.      *  ELEVATE wound to decrease swelling and pain.    4.  WATCH FOR SIGNS OF INFECTION      *  Redness      *  Swelling      *  Pus      *  Red streaks around wound      *  Increased pain      *  Fever      In the event that any of the symptoms listed occur, call your physician or the Emergency Room.

## 2024-09-09 DIAGNOSIS — I21.3 ST ELEVATION MYOCARDIAL INFARCTION (STEMI), UNSPECIFIED ARTERY (H): ICD-10-CM

## 2024-09-09 DIAGNOSIS — I24.9 ACS (ACUTE CORONARY SYNDROME) (H): ICD-10-CM

## 2024-09-09 DIAGNOSIS — I10 PRIMARY HYPERTENSION: ICD-10-CM

## 2024-09-13 NOTE — TELEPHONE ENCOUNTER
lisinopril (ZESTRIL) 10 MG tablet 30 tablet 0 8/9/2024     Last Office Visit: 8/7/23  Future Office visit:   none    Routing refill request to provider for review/approval because:  Gaby refill sent 8/9/24-no appt has been made    Hyacinth Shook RN  [facility] Central Nursing/Red Flag Triage & Med Refill Team

## 2024-09-18 DIAGNOSIS — I10 PRIMARY HYPERTENSION: ICD-10-CM

## 2024-09-18 DIAGNOSIS — I21.3 ST ELEVATION MYOCARDIAL INFARCTION (STEMI), UNSPECIFIED ARTERY (H): ICD-10-CM

## 2024-09-18 DIAGNOSIS — I24.9 ACS (ACUTE CORONARY SYNDROME) (H): ICD-10-CM

## 2024-09-18 RX ORDER — LISINOPRIL 10 MG/1
10 TABLET ORAL DAILY
Qty: 90 TABLET | OUTPATIENT
Start: 2024-09-18

## 2024-09-18 RX ORDER — LISINOPRIL 10 MG/1
10 TABLET ORAL DAILY
Qty: 30 TABLET | Refills: 0 | Status: SHIPPED | OUTPATIENT
Start: 2024-09-18

## 2024-09-19 NOTE — TELEPHONE ENCOUNTER
Needs appt for 90 day refill. LVD  8/7/2023  [facility]

## 2024-10-20 DIAGNOSIS — I10 PRIMARY HYPERTENSION: ICD-10-CM

## 2024-10-20 DIAGNOSIS — I21.3 ST ELEVATION MYOCARDIAL INFARCTION (STEMI), UNSPECIFIED ARTERY (H): ICD-10-CM

## 2024-10-20 DIAGNOSIS — I24.9 ACS (ACUTE CORONARY SYNDROME) (H): ICD-10-CM

## 2024-10-25 NOTE — TELEPHONE ENCOUNTER
lisinopril (ZESTRIL) 10 MG tablet 30 tablet 0 9/18/2024     Last Office Visit: 8/7/23  Future Office visit:   none    Routing refill request to provider for review/approval because:  Overdue for appt-did not schedule after last gabby refill    Hyacinth Shook RN  P Central Nursing/Red Flag Triage & Med Refill Team

## 2024-10-31 RX ORDER — LISINOPRIL 10 MG/1
10 TABLET ORAL DAILY
Qty: 30 TABLET | Refills: 0 | OUTPATIENT
Start: 2024-10-31

## 2024-11-04 ENCOUNTER — TELEPHONE (OUTPATIENT)
Dept: CARDIOLOGY | Facility: CLINIC | Age: 79
End: 2024-11-04
Payer: COMMERCIAL

## 2024-11-04 DIAGNOSIS — I10 PRIMARY HYPERTENSION: ICD-10-CM

## 2024-11-04 DIAGNOSIS — I24.9 ACS (ACUTE CORONARY SYNDROME) (H): ICD-10-CM

## 2024-11-04 DIAGNOSIS — I21.3 ST ELEVATION MYOCARDIAL INFARCTION (STEMI), UNSPECIFIED ARTERY (H): ICD-10-CM

## 2024-11-04 RX ORDER — LISINOPRIL 10 MG/1
10 TABLET ORAL DAILY
Qty: 30 TABLET | Refills: 0 | Status: SHIPPED | OUTPATIENT
Start: 2024-11-04

## 2024-11-04 NOTE — TELEPHONE ENCOUNTER
Left a phone message for Kimani concerning medication refills. He last saw Dr. Oleary in 2023 and did not follow up with labs.  He is requesting Lisinopril 10 mg daily.  He has been sent [employer] messages requesting that he make an appointment for labs and office visit.   Called his daughter in law and she states they are in the process of moving Kimani to an assisted living home. She states that since he wasn't getting any refills it must have been because he doesn't need the medications anymore.  I suggested she ask his primary care physician to take over his medications since he is more likely to see them yearly. If they decline she will need to set Kimani up with cardiology for labs and a med visit.

## 2024-11-07 RX ORDER — LISINOPRIL 10 MG/1
10 TABLET ORAL DAILY
Qty: 90 TABLET | OUTPATIENT
Start: 2024-11-07

## 2024-11-08 ENCOUNTER — TELEPHONE (OUTPATIENT)
Dept: CARDIOLOGY | Facility: CLINIC | Age: 79
End: 2024-11-08
Payer: COMMERCIAL

## 2024-11-08 NOTE — TELEPHONE ENCOUNTER
[facility] Call Center    Phone Message    May a detailed message be left on voicemail: yes     Reason for Call: Other: Patient's daughter in law called in regards to pt's records. Can pt's cardio records be sent to pt's primary provider so they can take over his medications.      Action Taken: Other: [facility] Cardio    Travel Screening: Not Applicable     Date of Service:

## 2024-11-13 NOTE — PLAN OF CARE
Goal Outcome Evaluation: Not progressing      Plan of Care Reviewed With: patient    Overall Patient Progress: decliningOverall Patient Progress: declining    Outcome Evaluation: Pt became more delirious including combative over night. Dex gtt started. Levo restarted for hypotension. James reinserted for strict I+Os and to keep B/L groin sites clean.  Neuro: Pt became comfused and combative midway through shift. Needing up to 5 people to perform cares.  Pt follows commands and moves all extremities. Pupals E/R/R B/L. One dose IV halidol and a total of 75mg of PO seroqel given. B/L LE restraints applied.  CV: Accelerated junctional w/ occ. PVCs and known   ST elevation in lead II. HR from 60 to 90s. Transvenious pacer unplugged d/t pacer spikes being on T waves and pacing when HR was in 70s and pacer rate set to  VVI 50. Map goal @ 65  Dopamine @ 12 mcg/kg/min. Levo restarted and titrated to map goal. IABP is 1:1 with augmentation and no issues.   Resp: 2 LPM NC -> 4 LPM NC. LS are clear and dim in the bases. Pt having sleep breathing pattern where he has swallow breaths, apnea, and then deep breaths, and destats. Pt's both legs flex during end of this cycle. Provider ordered Cpap and patient not able to tolerate Cpap.   :  Pt now has james in place. UO from 45 to 150 mL/hr.   GI: Regular diet.Two loose Bms over night.  Skin/Endo/Heme/ID: Large bruise on RUE, and skin tear on RUE.   Plan: Team to consider removing R femoral arterial stealth (line dampened and not drawing back).   For vital signs and complete assessments, please see documentation flowsheets.        anxiety...

## 2024-11-20 ENCOUNTER — DOCUMENTATION ONLY (OUTPATIENT)
Dept: ANTICOAGULATION | Facility: CLINIC | Age: 79
End: 2024-11-20

## 2024-11-20 NOTE — PROGRESS NOTES
ANTICOAGULATION MANAGEMENT PROGRAM    Dr. Oleary,     Our records indicate that Kimani Glover remains past due to check an INR. Kimani Glover was contacted multiple times over at least the last 8 weeks to attempt to arrange a follow up appointment.    Kimani Glover last had an an INR checked on 24 and was due for follow up on 24     Last [facility] referral date: 2023    Per review of recent chart notes patient is moving to an [facility] and his PCP is taking over medications. His [facility] referral will  on 24 and cannot be renewed without an annual visit with Dr. Oleary (last seen 23). Dr. Oleary, okay to discharge from [facility] management at this time? A new referral can be placed if patient returns his care to [facility].    Thank you,     [facility] Anticoagulation Management Program  one line: [phone]

## 2024-12-03 DIAGNOSIS — I21.3 ST ELEVATION MYOCARDIAL INFARCTION (STEMI), UNSPECIFIED ARTERY (H): ICD-10-CM

## 2024-12-03 DIAGNOSIS — I24.9 ACS (ACUTE CORONARY SYNDROME) (H): ICD-10-CM

## 2024-12-03 DIAGNOSIS — I10 PRIMARY HYPERTENSION: ICD-10-CM

## 2024-12-08 ENCOUNTER — LAB REQUISITION (OUTPATIENT)
Dept: LAB | Facility: CLINIC | Age: 79
End: 2024-12-08
Payer: MEDICAID

## 2024-12-08 DIAGNOSIS — G31.84 MILD COGNITIVE IMPAIRMENT OF UNCERTAIN OR UNKNOWN ETIOLOGY: ICD-10-CM

## 2024-12-08 DIAGNOSIS — I10 ESSENTIAL (PRIMARY) HYPERTENSION: ICD-10-CM

## 2024-12-08 DIAGNOSIS — E78.2 MIXED HYPERLIPIDEMIA: ICD-10-CM

## 2024-12-08 DIAGNOSIS — I48.0 PAROXYSMAL ATRIAL FIBRILLATION (H): ICD-10-CM

## 2024-12-08 DIAGNOSIS — I25.2 OLD MYOCARDIAL INFARCTION: ICD-10-CM

## 2024-12-09 NOTE — TELEPHONE ENCOUNTER
lisinopril (ZESTRIL) 10 MG tablet 30 tablet 0 11/4/2024     Last Office Visit: 8/7/23  Future Office visit:   none    Routing refill request to provider for review/approval because:  Gaby refill sent 11/14/24  Has not made an appt    Hyacinth Shook RN  [facility] Central Nursing/Red Flag Triage & Med Refill Team

## 2024-12-10 LAB
ALBUMIN SERPL BCG-MCNC: 3.7 G/DL (ref 3.5–5.2)
ALP SERPL-CCNC: 70 U/L (ref 40–150)
ALT SERPL W P-5'-P-CCNC: 19 U/L (ref 0–70)
ANION GAP SERPL CALCULATED.3IONS-SCNC: 9 MMOL/L (ref 7–15)
AST SERPL W P-5'-P-CCNC: 28 U/L (ref 0–45)
BILIRUB SERPL-MCNC: 0.5 MG/DL
BUN SERPL-MCNC: 13.1 MG/DL (ref 8–23)
CALCIUM SERPL-MCNC: 9.8 MG/DL (ref 8.8–10.4)
CHLORIDE SERPL-SCNC: 106 MMOL/L (ref 98–107)
CHOLEST SERPL-MCNC: 88 MG/DL
CREAT SERPL-MCNC: 0.86 MG/DL (ref 0.67–1.17)
EGFRCR SERPLBLD CKD-EPI 2021: 88 ML/MIN/1.73M2
ERYTHROCYTE [DISTWIDTH] IN BLOOD BY AUTOMATED COUNT: 15.1 % (ref 10–15)
FASTING STATUS PATIENT QL REPORTED: NO
GLUCOSE SERPL-MCNC: 121 MG/DL (ref 70–99)
HCO3 SERPL-SCNC: 21 MMOL/L (ref 22–29)
HCT VFR BLD AUTO: 42.1 % (ref 40–53)
HDLC SERPL-MCNC: 39 MG/DL
HGB BLD-MCNC: 14 G/DL (ref 13.3–17.7)
LDLC SERPL CALC-MCNC: 30 MG/DL
MCH RBC QN AUTO: 29.4 PG (ref 26.5–33)
MCHC RBC AUTO-ENTMCNC: 33.3 G/DL (ref 31.5–36.5)
MCV RBC AUTO: 88 FL (ref 78–100)
NONHDLC SERPL-MCNC: 49 MG/DL
PLATELET # BLD AUTO: 151 10E3/UL (ref 150–450)
POTASSIUM SERPL-SCNC: 4.5 MMOL/L (ref 3.4–5.3)
PROT SERPL-MCNC: 6.1 G/DL (ref 6.4–8.3)
RBC # BLD AUTO: 4.77 10E6/UL (ref 4.4–5.9)
SODIUM SERPL-SCNC: 136 MMOL/L (ref 135–145)
TRIGL SERPL-MCNC: 93 MG/DL
TSH SERPL DL<=0.005 MIU/L-ACNC: 1.48 UIU/ML (ref 0.3–4.2)
VIT B12 SERPL-MCNC: 549 PG/ML (ref 232–1245)
WBC # BLD AUTO: 7.7 10E3/UL (ref 4–11)

## 2024-12-10 PROCEDURE — 84443 ASSAY THYROID STIM HORMONE: CPT | Mod: ORL | Performed by: PHYSICIAN ASSISTANT

## 2024-12-10 PROCEDURE — 85027 COMPLETE CBC AUTOMATED: CPT | Mod: ORL | Performed by: PHYSICIAN ASSISTANT

## 2024-12-10 PROCEDURE — 80061 LIPID PANEL: CPT | Mod: ORL | Performed by: PHYSICIAN ASSISTANT

## 2024-12-10 PROCEDURE — P9603 ONE-WAY ALLOW PRORATED MILES: HCPCS | Mod: ORL | Performed by: PHYSICIAN ASSISTANT

## 2024-12-10 PROCEDURE — 82607 VITAMIN B-12: CPT | Mod: ORL | Performed by: PHYSICIAN ASSISTANT

## 2024-12-10 PROCEDURE — 36415 COLL VENOUS BLD VENIPUNCTURE: CPT | Mod: ORL | Performed by: PHYSICIAN ASSISTANT

## 2024-12-10 PROCEDURE — 80053 COMPREHEN METABOLIC PANEL: CPT | Mod: ORL | Performed by: PHYSICIAN ASSISTANT

## 2024-12-19 RX ORDER — LISINOPRIL 10 MG/1
10 TABLET ORAL DAILY
Qty: 90 TABLET | OUTPATIENT
Start: 2024-12-19

## 2025-01-26 ENCOUNTER — APPOINTMENT (OUTPATIENT)
Dept: CT IMAGING | Facility: HOSPITAL | Age: 80
End: 2025-01-26
Attending: EMERGENCY MEDICINE
Payer: COMMERCIAL

## 2025-01-26 ENCOUNTER — HOSPITAL ENCOUNTER (EMERGENCY)
Facility: HOSPITAL | Age: 80
Discharge: HOME | End: 2025-01-26
Attending: EMERGENCY MEDICINE | Admitting: EMERGENCY MEDICINE
Payer: COMMERCIAL

## 2025-01-26 VITALS
RESPIRATION RATE: 10 BRPM | DIASTOLIC BLOOD PRESSURE: 74 MMHG | BODY MASS INDEX: 33.95 KG/M2 | TEMPERATURE: 98.5 F | OXYGEN SATURATION: 95 % | SYSTOLIC BLOOD PRESSURE: 150 MMHG | HEIGHT: 68 IN | WEIGHT: 224 LBS | HEART RATE: 75 BPM

## 2025-01-26 DIAGNOSIS — R07.9 ACUTE CHEST PAIN: ICD-10-CM

## 2025-01-26 LAB
ALBUMIN SERPL BCG-MCNC: 3.8 G/DL (ref 3.5–5.2)
ALP SERPL-CCNC: 76 U/L (ref 40–150)
ALT SERPL W P-5'-P-CCNC: 23 U/L (ref 0–70)
ANION GAP SERPL CALCULATED.3IONS-SCNC: 9 MMOL/L (ref 7–15)
APTT PPP: 32 SECONDS (ref 22–38)
AST SERPL W P-5'-P-CCNC: 22 U/L (ref 0–45)
BASOPHILS # BLD AUTO: 0.1 10E3/UL (ref 0–0.2)
BASOPHILS NFR BLD AUTO: 1 %
BILIRUB DIRECT SERPL-MCNC: <0.2 MG/DL (ref 0–0.3)
BILIRUB SERPL-MCNC: 0.6 MG/DL
BUN SERPL-MCNC: 9.4 MG/DL (ref 8–23)
CALCIUM SERPL-MCNC: 10.4 MG/DL (ref 8.8–10.4)
CHLORIDE SERPL-SCNC: 106 MMOL/L (ref 98–107)
CREAT SERPL-MCNC: 0.89 MG/DL (ref 0.67–1.17)
EGFRCR SERPLBLD CKD-EPI 2021: 86 ML/MIN/1.73M2
EOSINOPHIL # BLD AUTO: 0.2 10E3/UL (ref 0–0.7)
EOSINOPHIL NFR BLD AUTO: 2 %
ERYTHROCYTE [DISTWIDTH] IN BLOOD BY AUTOMATED COUNT: 13.7 % (ref 10–15)
GLUCOSE SERPL-MCNC: 119 MG/DL (ref 70–99)
HCO3 SERPL-SCNC: 23 MMOL/L (ref 22–29)
HCT VFR BLD AUTO: 43.5 % (ref 40–53)
HGB BLD-MCNC: 14.5 G/DL (ref 13.3–17.7)
IMM GRANULOCYTES # BLD: 0.1 10E3/UL
IMM GRANULOCYTES NFR BLD: 1 %
INR PPP: 1.08 (ref 0.85–1.15)
LIPASE SERPL-CCNC: 70 U/L (ref 13–60)
LYMPHOCYTES # BLD AUTO: 1.1 10E3/UL (ref 0.8–5.3)
LYMPHOCYTES NFR BLD AUTO: 13 %
MCH RBC QN AUTO: 28.5 PG (ref 26.5–33)
MCHC RBC AUTO-ENTMCNC: 33.3 G/DL (ref 31.5–36.5)
MCV RBC AUTO: 86 FL (ref 78–100)
MONOCYTES # BLD AUTO: 0.3 10E3/UL (ref 0–1.3)
MONOCYTES NFR BLD AUTO: 4 %
NEUTROPHILS # BLD AUTO: 6.5 10E3/UL (ref 1.6–8.3)
NEUTROPHILS NFR BLD AUTO: 79 %
NRBC # BLD AUTO: 0 10E3/UL
NRBC BLD AUTO-RTO: 0 /100
PLATELET # BLD AUTO: 164 10E3/UL (ref 150–450)
POTASSIUM SERPL-SCNC: 4.2 MMOL/L (ref 3.4–5.3)
PROT SERPL-MCNC: 6.3 G/DL (ref 6.4–8.3)
RBC # BLD AUTO: 5.09 10E6/UL (ref 4.4–5.9)
SODIUM SERPL-SCNC: 138 MMOL/L (ref 135–145)
TROPONIN T SERPL HS-MCNC: 10 NG/L
TROPONIN T SERPL HS-MCNC: 8 NG/L
WBC # BLD AUTO: 8.2 10E3/UL (ref 4–11)

## 2025-01-26 PROCEDURE — 82248 BILIRUBIN DIRECT: CPT | Performed by: EMERGENCY MEDICINE

## 2025-01-26 PROCEDURE — 80048 BASIC METABOLIC PNL TOTAL CA: CPT | Performed by: STUDENT IN AN ORGANIZED HEALTH CARE EDUCATION/TRAINING PROGRAM

## 2025-01-26 PROCEDURE — 84484 ASSAY OF TROPONIN QUANT: CPT | Performed by: STUDENT IN AN ORGANIZED HEALTH CARE EDUCATION/TRAINING PROGRAM

## 2025-01-26 PROCEDURE — 93005 ELECTROCARDIOGRAM TRACING: CPT | Performed by: STUDENT IN AN ORGANIZED HEALTH CARE EDUCATION/TRAINING PROGRAM

## 2025-01-26 PROCEDURE — 80053 COMPREHEN METABOLIC PANEL: CPT | Performed by: STUDENT IN AN ORGANIZED HEALTH CARE EDUCATION/TRAINING PROGRAM

## 2025-01-26 PROCEDURE — 250N000013 HC RX MED GY IP 250 OP 250 PS 637: Performed by: EMERGENCY MEDICINE

## 2025-01-26 PROCEDURE — 250N000011 HC RX IP 250 OP 636: Performed by: EMERGENCY MEDICINE

## 2025-01-26 PROCEDURE — 82040 ASSAY OF SERUM ALBUMIN: CPT | Performed by: EMERGENCY MEDICINE

## 2025-01-26 PROCEDURE — 82310 ASSAY OF CALCIUM: CPT | Performed by: STUDENT IN AN ORGANIZED HEALTH CARE EDUCATION/TRAINING PROGRAM

## 2025-01-26 PROCEDURE — 71275 CT ANGIOGRAPHY CHEST: CPT

## 2025-01-26 PROCEDURE — 85730 THROMBOPLASTIN TIME PARTIAL: CPT | Performed by: EMERGENCY MEDICINE

## 2025-01-26 PROCEDURE — 36415 COLL VENOUS BLD VENIPUNCTURE: CPT | Performed by: STUDENT IN AN ORGANIZED HEALTH CARE EDUCATION/TRAINING PROGRAM

## 2025-01-26 PROCEDURE — 85610 PROTHROMBIN TIME: CPT | Performed by: EMERGENCY MEDICINE

## 2025-01-26 PROCEDURE — 74177 CT ABD & PELVIS W/CONTRAST: CPT

## 2025-01-26 PROCEDURE — 83690 ASSAY OF LIPASE: CPT | Performed by: EMERGENCY MEDICINE

## 2025-01-26 PROCEDURE — 36415 COLL VENOUS BLD VENIPUNCTURE: CPT | Performed by: EMERGENCY MEDICINE

## 2025-01-26 PROCEDURE — 85004 AUTOMATED DIFF WBC COUNT: CPT | Performed by: STUDENT IN AN ORGANIZED HEALTH CARE EDUCATION/TRAINING PROGRAM

## 2025-01-26 PROCEDURE — 99285 EMERGENCY DEPT VISIT HI MDM: CPT | Mod: 25

## 2025-01-26 RX ORDER — ASPIRIN 81 MG/1
81 TABLET, CHEWABLE ORAL ONCE
Status: DISCONTINUED | OUTPATIENT
Start: 2025-01-26 | End: 2025-01-26

## 2025-01-26 RX ORDER — NITROGLYCERIN 0.4 MG/1
0.4 TABLET SUBLINGUAL EVERY 5 MIN PRN
Status: DISCONTINUED | OUTPATIENT
Start: 2025-01-26 | End: 2025-01-26 | Stop reason: HOSPADM

## 2025-01-26 RX ORDER — IOPAMIDOL 755 MG/ML
90 INJECTION, SOLUTION INTRAVASCULAR ONCE
Status: COMPLETED | OUTPATIENT
Start: 2025-01-26 | End: 2025-01-26

## 2025-01-26 RX ORDER — ACETAMINOPHEN 325 MG/1
975 TABLET ORAL ONCE
Status: COMPLETED | OUTPATIENT
Start: 2025-01-26 | End: 2025-01-26

## 2025-01-26 RX ORDER — ACETAMINOPHEN 325 MG/1
975 TABLET ORAL ONCE
Status: DISCONTINUED | OUTPATIENT
Start: 2025-01-26 | End: 2025-01-26

## 2025-01-26 RX ORDER — MAGNESIUM HYDROXIDE/ALUMINUM HYDROXICE/SIMETHICONE 120; 1200; 1200 MG/30ML; MG/30ML; MG/30ML
15 SUSPENSION ORAL ONCE
Status: COMPLETED | OUTPATIENT
Start: 2025-01-26 | End: 2025-01-26

## 2025-01-26 RX ADMIN — IOPAMIDOL 90 ML: 755 INJECTION, SOLUTION INTRAVENOUS at 14:11

## 2025-01-26 RX ADMIN — ALUMINUM HYDROXIDE, MAGNESIUM HYDROXIDE, AND SIMETHICONE 15 ML: 200; 200; 20 SUSPENSION ORAL at 15:30

## 2025-01-26 RX ADMIN — NITROGLYCERIN 0.4 MG: 0.4 TABLET, ORALLY DISINTEGRATING SUBLINGUAL at 14:40

## 2025-01-26 RX ADMIN — ACETAMINOPHEN 975 MG: 325 TABLET ORAL at 14:39

## 2025-01-26 ASSESSMENT — ENCOUNTER SYMPTOMS
COUGH: 0
NAUSEA: 0
DIZZINESS: 0
VOMITING: 0
DIARRHEA: 0
BACK PAIN: 0
LIGHT-HEADEDNESS: 1
FEVER: 0
SHORTNESS OF BREATH: 0
ABDOMINAL PAIN: 0

## 2025-01-26 ASSESSMENT — ACTIVITIES OF DAILY LIVING (ADL)
ADLS_ACUITY_SCORE: 58

## 2025-01-26 ASSESSMENT — COLUMBIA-SUICIDE SEVERITY RATING SCALE - C-SSRS
2. HAVE YOU ACTUALLY HAD ANY THOUGHTS OF KILLING YOURSELF IN THE PAST MONTH?: NO
6. HAVE YOU EVER DONE ANYTHING, STARTED TO DO ANYTHING, OR PREPARED TO DO ANYTHING TO END YOUR LIFE?: NO
1. IN THE PAST MONTH, HAVE YOU WISHED YOU WERE DEAD OR WISHED YOU COULD GO TO SLEEP AND NOT WAKE UP?: NO

## 2025-01-26 NOTE — DISCHARGE INSTRUCTIONS
You are seen today for some chest discomfort.  No cause of your symptoms was found.  You are feeling better here and was discharged home.    There was a nodule in your lung that was seen that will need to be followed up.  Your primary care doctor can help with follow-up CT scans.

## 2025-01-26 NOTE — ED TRIAGE NOTES
"Patient has complaints of chest pressure that started at 0600. He is living at the [facility] living in [facility] and was brought in my [facility]. 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  Subjective \"dizziness\" that gets better upon standing     24 ASA   1 nitroglycerin   /80    NO NS and NO IV from  EMS     Triage Assessment (Adult)       Row Name 01/26/25 1118          Triage Assessment    Airway WDL WDL        Respiratory WDL    Respiratory WDL WDL        Skin Circulation/Temperature WDL    Skin Circulation/Temperature WDL WDL        Cardiac WDL    Cardiac WDL chest pain        Chest Pain Assessment    Character tightness        Peripheral/Neurovascular WDL    Peripheral Neurovascular WDL WDL        Cognitive/Neuro/Behavioral WDL    Cognitive/Neuro/Behavioral WDL WDL                     "

## 2025-01-26 NOTE — ED PROVIDER NOTES
eMERGENCY dEPARTMENT PROGRESS NOTE         ED COURSE AND MEDICAL DECISION MAKING  Patient was signed out to me by Dr. Bland at 2:30 PM      Kimani Glover is a 79 year old male who presents for evaluation of chest pain.    At 4:00 the nurse came to me and said patient was feeling better and wanted to discharge home.  I discussed results with him and we will get him dismissed.  I did discuss the lung nodule and need for follow-up and he was comfortable with that plan.  LAB  Pertinent labs results reviewed   Labs Ordered and Resulted from Time of ED Arrival to Time of ED Departure   BASIC METABOLIC PANEL - Abnormal       Result Value    Sodium 138      Potassium 4.2      Chloride 106      Carbon Dioxide (CO2) 23      Anion Gap 9      Urea Nitrogen 9.4      Creatinine 0.89      GFR Estimate 86      Calcium 10.4      Glucose 119 (*)    HEPATIC FUNCTION PANEL - Abnormal    Protein Total 6.3 (*)     Albumin 3.8      Bilirubin Total 0.6      Alkaline Phosphatase 76      AST 22      ALT 23      Bilirubin Direct <0.20     LIPASE - Abnormal    Lipase 70 (*)    TROPONIN T, HIGH SENSITIVITY - Normal    Troponin T, High Sensitivity 10     INR - Normal    INR 1.08     PARTIAL THROMBOPLASTIN TIME - Normal    aPTT 32     TROPONIN T, HIGH SENSITIVITY - Normal    Troponin T, High Sensitivity 8     CBC WITH PLATELETS AND DIFFERENTIAL    WBC Count 8.2      RBC Count 5.09      Hemoglobin 14.5      Hematocrit 43.5      MCV 86      MCH 28.5      MCHC 33.3      RDW 13.7      Platelet Count 164      % Neutrophils 79      % Lymphocytes 13      % Monocytes 4      % Eosinophils 2      % Basophils 1      % Immature Granulocytes 1      NRBCs per 100 WBC 0      Absolute Neutrophils 6.5      Absolute Lymphocytes 1.1      Absolute Monocytes 0.3      Absolute Eosinophils 0.2      Absolute Basophils 0.1      Absolute Immature Granulocytes 0.1      Absolute NRBCs 0.0             RADIOLOGY    Pertinent imaging reviewed   Please see official  radiology report.  CT Abdomen Pelvis w Contrast   Final Result   IMPRESSION:    1.  Normal enhancement of the pancreas without inflammation or ductal dilatation.   2.  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 No obstructive changes or inflammation.   3.  Parapelvic left renal cysts require no follow-up. No kidney stones or hydronephrosis.   4.  Small esophageal hiatal hernia.   5.  Small fat-containing umbilical hernia.      CT Chest Pulmonary Embolism w Contrast   Final Result   IMPRESSION:   1.  No CT evidence of pulmonary embolism.   2.  There is a new indeterminate solid well-circumscribed nodule right middle lobe. See follow-up recommendations below. There  are additional small calcified granulomas within both lungs   3.  Mild bibasilar atelectasis without pulmonary consolidation or pleural fluid.   4.  Small esophageal hiatal hernia.      Fleischner Society Recommendations for Pulmonary Nodules      Nodule size greater than 6 mm up to 8 mm:      CT at 6-12 months, then consider CT at 18-24 months                      FINAL IMPRESSION    1. Acute chest pain         DISCHARGE MEDICATIONS  New Prescriptions    No medications on file        Aimee Manuel MD  01/26/25 0102

## 2025-01-26 NOTE — ED PROVIDER NOTES
EMERGENCY DEPARTMENT ENCOUNTER      NAME: Kimani Glover  AGE: 79 year old male  YOB: 1945  MRN: [id number]  EVALUATION DATE & TIME: 1/26/2025 11:16 AM    PCP: No Ref-Primary, Physician    ED PROVIDER: Dipti Bland M.D.        Chief Complaint   Patient presents with    Chest Wall Pain         FINAL IMPRESSION:    1. Acute chest pain            MEDICAL DECISION MAKING:    Kimani Glover is a 79 year old male with history of hyperlipidemia, hypertension, anxiety, diabetes, ACS, inferior STEMI with RCA dissection, who presents to the ER with complaints of left-sided chest pain.    Since around 10 AM today he has been feeling a tightness to his left chest that he describes as more of a pressure and less like pain.  He is unsure if anything makes it worse as he has not tried to ambulate or do any type of activity.  Does not change with any kind of movement or breathing.    Initial troponin unremarkable.  EKG nonspecific.  Patient signed out at change of shift awaiting CT scan results and see if any of the patient's pain to resolve with nitroglycerin and Tylenol.  He received aspirin 325mg prior to arrival by EMS.      Pt signed out at change of shift to [facility]      ED COURSE:  11:48 AM  I met with the patient to gather history and perform my exam. ED course and treatment discussed.     1:32 PM  Initial troponin looks good.  Lipase slightly elevated.  Plan at this time is to do CT to the chest as well as the abdomen pelvis.  Patient aware that plan and agrees.  He continues to complain of some chest heaviness and pressure.  Updated the nurse that she should give the nitroglycerin ordered and will do tylenol as well. Pt received ASA by EMS prior to arrival.    2:45 PM  Pt signed out at change of shift to [facility] awaiting CT scan result and response to tylenol and nitroglycerin.     I do not think that this represents rib fractures, myocarditis, pericarditis, endocarditis, PE,  "ruptured AAA, pneumothorax, aortic dissection, bowel obstruction, bowel ischemia, cholecystitis, kidney stone, pyelonephritis, or other such etiologies at this time.  I feel that ACS is less likely.      Since patient's symptoms have been constant for at ~ 3 hours prior to arrival and 2 sets of troponins are overall unremarkable it would be reasonable for consideration for discharge home if CT imaging otherwise looks good and patient is able to have some improvement in his pain symptoms.  If he does go home likely good idea to refer him to cardiology rapid access clinic given his cardiac history.      CONSULTANTS:  none        MEDICATIONS GIVEN IN THE EMERGENCY:  Medications   nitroGLYcerin (NITROSTAT) sublingual tablet 0.4 mg (0.4 mg Sublingual $Given 1/26/25 1440)   alum & mag hydroxide-simethicone (MAALOX) suspension 15 mL (has no administration in time range)   iopamidol (ISOVUE-370) solution 90 mL (90 mLs Intravenous $Given 1/26/25 1411)   acetaminophen (TYLENOL) tablet 975 mg (975 mg Oral $Given 1/26/25 1439)           NEW PRESCRIPTIONS STARTED AT TODAY'S ER VISIT     Medication List      There are no discharge medications for this visit.             CONDITION:  stable        DISPOSITION:  pending         =================================================================  =================================================================  TRIAGE ASSESSMENT:  Patient has complaints of chest pressure that started at 0600. He is living at the [facility] living in [facility] and was brought in my [facility]. 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  Subjective \"dizziness\" that gets better upon standing     24 ASA   1 nitroglycerin   /80    NO NS and NO IV from  EMS     Triage Assessment (Adult)       Row Name 01/26/25 1115          Triage Assessment    Airway WDL WDL        Respiratory WDL    Respiratory WDL WDL        Skin Circulation/Temperature WDL    Skin Circulation/Temperature WDL WDL        Cardiac WDL    Cardiac WDL chest pain " "       Chest Pain Assessment    Character tightness        Peripheral/Neurovascular WDL    Peripheral Neurovascular WDL WDL        Cognitive/Neuro/Behavioral WDL    Cognitive/Neuro/Behavioral WDL WDL                          ED Triage Vitals   Encounter Vitals Group      BP 01/26/25 1120 118/55      Systolic BP Percentile --       Diastolic BP Percentile --       Pulse 01/26/25 1117 79      Resp 01/26/25 1120 16      Temp 01/26/25 1118 98.5  F (36.9  C)      Temp src --       SpO2 01/26/25 1117 94 %      Weight 01/26/25 1118 101.6 kg (224 lb)      Height 01/26/25 1118 1.727 m (5' 8\")       ================================================================  ================================================================    HPI    Patient information was obtained from: patient    Use of Intrepreter: N/A      Kimani LAWS Adalberto is a 79 year old male with history of hyperlipidemia, hypertension, anxiety, diabetes, ACS, inferior STEMI with RCA dissection, who presents to the ER with complaints of left-sided chest pain.    Since around 10 AM today he has been feeling a tightness to his left chest that he describes as more of a pressure and less like pain.  He is unsure if anything makes it worse as he has not tried to ambulate or do any type of activity.  Does not change with any kind of movement or breathing.    He has not taken any nitro.  He does take aspirin daily per patient but is unsure if he takes it in the morning at night and unsure if he took any today.  He states his care center manages all his medications.    There was denies fevers, cough, shortness of breath, abdominal pain, vomiting or diarrhea.    He states just sitting here he feels a little lightheaded but does not better or worsen with standing or position change.  Denies any room spinning, near syncope, etc.  States he just feels a little woozy.    He did not eat breakfast today though states that he never eats breakfast.  Usually eats an early lunch but " has not yet eaten today.    He states he had a heart attack about 2 years ago at which time he was seen here and then rushed down to the [facility].  He states this does not feel the same as at that time he had no chest pain, only sounds like had either syncopal episode or unresponsive.      CHART REVIEW:  Admission note from June 11, 2023:  Cardiology ICU History and Physical      HPI:      Kimani Glover is a 77 year old male who was admitted on 6/11/2023 following STEMI and PCI. Patient presented to [facility] ED after near syncope. Patient was reportedly with his family when he fell and almost lost consciousness. EMS was called and he was found to be lethargic with SBP 58 mmHg and bradycardic HR 30s. He was brought to [facility] ED where he was found to be bradycardic 32 bpm and hypotensive. EKG showed inferior STEMI. He was started on dopamine and taken to the cath lab for emergent angiography.      Coronary angiogram showed occlusion of dominant RCA. PCI was attempted but was complicated by dissection and no-reflow. Temporary pace maker was placed. He was transferred to [facility] for further management.      On arrival patient hemodynamically stable on 12 dopamine and 0.01 epinephrine. Patient comfortable without chest pain, shortness of breath, lightheadedness, dizziness, fevers, chills.       REVIEW OF SYSTEMS  Review of Systems   Constitutional:  Negative for fever.   Respiratory:  Negative for cough and shortness of breath.    Cardiovascular:  Positive for chest pain.   Gastrointestinal:  Negative for abdominal pain, diarrhea, nausea and vomiting.   Musculoskeletal:  Negative for back pain.   Neurological:  Positive for light-headedness. Negative for dizziness.   All other systems reviewed and are negative.      PAST MEDICAL HISTORY:  History reviewed. No pertinent past medical history.      PAST SURGICAL HISTORY:  Past Surgical History:   Procedure Laterality Date    CV CORONARY ANGIOGRAM N/A  6/11/2023    Procedure: Coronary Angiogram;  Surgeon: Filemon Leavitt MD;  Location: [facility] LAB CV    CV CORONARY ANGIOGRAM N/A 6/11/2023    Procedure: Coronary Angiogram;  Surgeon: Derek Mullen MD;  Location:  HEART CARDIAC CATH LAB    CV INTRA AORTIC BALLOON N/A 6/11/2023    Procedure: Intraprocedure Aortic Balloon Pump Insertion;  Surgeon: Filemon Leavitt MD;  Location: [facility] CV    CV PCI ASPIRATION THROMECTOMY N/A 6/11/2023    Procedure: Percutaneous Coronary Intervention Aspiration Thrombectomy;  Surgeon: Filemon Leavitt MD;  Location: [facility] CV    CV PCI STENT DRUG ELUTING N/A 6/11/2023    Procedure: Percutaneous Coronary Intervention Stent;  Surgeon: Filemon Leavitt MD;  Location: [facility] CV    CV TEMPORARY PACEMAKER INSERTION N/A 6/11/2023    Procedure: Temporary Pacemaker Insertion;  Surgeon: Filemon Leavitt MD;  Location: [facility] CV    LAPAROSCOPIC APPENDECTOMY N/A 9/2/2022    Procedure: APPENDECTOMY, LAPAROSCOPIC;  Surgeon: Robi Carmona MD;  [facility] [place]         CURRENT MEDICATIONS:    Prior to Admission medications    Medication Sig Start Date End Date Taking? Authorizing Provider   acetaminophen (TYLENOL) 500 MG tablet Take 2 tablets (1,000 mg) by mouth every 6 hours as needed for mild pain 9/3/22   Walt Ignacio DO   atorvastatin (LIPITOR) 80 MG tablet Take 1 tablet (80 mg) by mouth every evening 6/21/24   Alvino Oleary MD   fish oil-omega-3 fatty acids (FISH OIL) 300-1,000 mg capsule [FISH OIL-OMEGA-3 FATTY ACIDS (FISH OIL) 300-1,000 MG CAPSULE] Take 1 g by mouth daily. 8/13/20   Provider, Historical   lisinopril (ZESTRIL) 10 MG tablet Take 1 tablet (10 mg) by mouth daily. *PLEASE SCHEDULE APPT. FOR REFILLS [phone]* 11/4/24   Alvino Oleary MD   metoprolol succinate ER (TOPROL XL) 25 MG 24 hr tablet Take 1 tablet (25 mg) by mouth daily 6/18/24   Alvino Oleary MD   Multiple  Vitamins-Minerals (MENS 50+ MULTI VITAMIN/MIN PO) Take 1 tablet by mouth daily    Unknown, Entered By History   ticagrelor (BRILINTA) 90 MG tablet Take 1 tablet (90 mg) by mouth 2 times daily 1/15/24   Alvino Oleary MD   traZODone (DESYREL) 50 MG tablet [TRAZODONE (DESYREL) 50 MG TABLET] Take 50 mg by mouth at bedtime. 8/13/20   Provider, Historical   warfarin ANTICOAGULANT (COUMADIN) 4 MG tablet Take 0.5 (2 mg) to 1 tablet (4 mg) by mouth every day OR as directed by Anticoagulation Clinic 6/12/24   Alvino Oleary MD         ALLERGIES:  No Known Allergies      FAMILY HISTORY:  History reviewed. No pertinent family history.      SOCIAL HISTORY:  Social History     Socioeconomic History    Marital status: Single   Tobacco Use    Smoking status: Never    Smokeless tobacco: Never   Substance and Sexual Activity    Alcohol use: Yes     Alcohol/week: 15.0 standard drinks of alcohol    Drug use: Never    Sexual activity: Yes     Partners: Female     Birth control/protection: Condom     Social Drivers of Health     Financial Resource Strain: Low Risk  (11/13/2024)    Received from [facility]    Financial Resource Strain     Difficulty of Paying Living Expenses: 3   Food Insecurity: No Food Insecurity (11/13/2024)    Received from [facility]    Food Insecurity     Do you worry your food will run out before you are able to buy more?: 1   Transportation Needs: No Transportation Needs (11/13/2024)    Received from [facility]    Transportation Needs     Does lack of transportation keep you from medical appointments?: 1     Does lack of transportation keep you from work, meetings or getting things that you need?: 1   Social Connections: Socially Integrated (11/13/2024)    Received from [facility]    Social Connections     Do you often feel lonely or isolated from those around you?: 0  "  Housing Stability: Low Risk  (11/13/2024)    Received from [facility]    Housing Stability     What is your housing situation today?: 1         VITALS:  Patient Vitals for the past 24 hrs:   BP Temp Pulse Resp SpO2 Height Weight   01/26/25 1120 118/55 -- -- 16 -- -- --   01/26/25 1118 -- 98.5  F (36.9  C) 78 -- 96 % 1.727 m (5' 8\") 101.6 kg (224 lb)   01/26/25 1117 -- -- 79 -- 94 % -- --       Wt Readings from Last 3 Encounters:   01/26/25 101.6 kg (224 lb)   08/07/23 101.7 kg (224 lb 3.2 oz)   06/19/23 99.2 kg (218 lb 11.1 oz)       Estimated Creatinine Clearance: 75.2 mL/min (based on SCr of 0.89 mg/dL).    PHYSICAL EXAM    Constitutional:  Well developed, Well nourished, NAD  HENT:  Normocephalic, Atraumatic, Bilateral external ears normal, Nose normal. Neck- Supple, No stridor.   Eyes:  PERRL, EOMI, Conjunctiva normal, No discharge.  Respiratory:  Normal breath sounds, No respiratory distress, No wheezing, Speaks full sentences easily. No cough.   Cardiovascular:  Normal heart rate, Regular rhythm, No murmurs, No rubs, No gallops. Chest wall nontender.   GI:  +obesity.  Bowel sounds normal, Soft, No tenderness, No masses, No flank tenderness. No rebound or guarding.   : deferred  Musculoskeletal: 2+ DP pulses. No cyanosis, No clubbing. Good range of motion in all major joints. No major deformities noted.  Integument:  Warm, Dry, No erythema, No rash.  No petechiae.   Neurologic:  Alert & oriented x 3  Psychiatric:  Affect normal, Cooperative         LAB:  All pertinent labs reviewed and interpreted.  Recent Results (from the past 24 hours)   Basic metabolic panel    Collection Time: 01/26/25 11:32 AM   Result Value Ref Range    Sodium 138 135 - 145 mmol/L    Potassium 4.2 3.4 - 5.3 mmol/L    Chloride 106 98 - 107 mmol/L    Carbon Dioxide (CO2) 23 22 - 29 mmol/L    Anion Gap 9 7 - 15 mmol/L    Urea Nitrogen 9.4 8.0 - 23.0 mg/dL    Creatinine 0.89 0.67 - 1.17 mg/dL    GFR " Estimate 86 >60 mL/min/1.73m2    Calcium 10.4 8.8 - 10.4 mg/dL    Glucose 119 (H) 70 - 99 mg/dL   Troponin T, High Sensitivity    Collection Time: 01/26/25 11:32 AM   Result Value Ref Range    Troponin T, High Sensitivity 10 <=22 ng/L   CBC with platelets and differential    Collection Time: 01/26/25 11:32 AM   Result Value Ref Range    WBC Count 8.2 4.0 - 11.0 10e3/uL    RBC Count 5.09 4.40 - 5.90 10e6/uL    Hemoglobin 14.5 13.3 - 17.7 g/dL    Hematocrit 43.5 40.0 - 53.0 %    MCV 86 78 - 100 fL    MCH 28.5 26.5 - 33.0 pg    MCHC 33.3 31.5 - 36.5 g/dL    RDW 13.7 10.0 - 15.0 %    Platelet Count 164 150 - 450 10e3/uL    % Neutrophils 79 %    % Lymphocytes 13 %    % Monocytes 4 %    % Eosinophils 2 %    % Basophils 1 %    % Immature Granulocytes 1 %    NRBCs per 100 WBC 0 <1 /100    Absolute Neutrophils 6.5 1.6 - 8.3 10e3/uL    Absolute Lymphocytes 1.1 0.8 - 5.3 10e3/uL    Absolute Monocytes 0.3 0.0 - 1.3 10e3/uL    Absolute Eosinophils 0.2 0.0 - 0.7 10e3/uL    Absolute Basophils 0.1 0.0 - 0.2 10e3/uL    Absolute Immature Granulocytes 0.1 <=0.4 10e3/uL    Absolute NRBCs 0.0 10e3/uL   Hepatic function panel    Collection Time: 01/26/25 11:32 AM   Result Value Ref Range    Protein Total 6.3 (L) 6.4 - 8.3 g/dL    Albumin 3.8 3.5 - 5.2 g/dL    Bilirubin Total 0.6 <=1.2 mg/dL    Alkaline Phosphatase 76 40 - 150 U/L    AST 22 0 - 45 U/L    ALT 23 0 - 70 U/L    Bilirubin Direct <0.20 0.00 - 0.30 mg/dL   Lipase    Collection Time: 01/26/25 11:32 AM   Result Value Ref Range    Lipase 70 (H) 13 - 60 U/L   INR    Collection Time: 01/26/25 12:21 PM   Result Value Ref Range    INR 1.08 0.85 - 1.15   PTT    Collection Time: 01/26/25 12:21 PM   Result Value Ref Range    aPTT 32 22 - 38 Seconds       Lab Results   Component Value Date    ABORH O POS 06/14/2023           RADIOLOGY:  Reviewed all pertinent imaging. Please see official radiology report.    CT Chest Pulmonary Embolism w Contrast    (Results Pending)   CT Abdomen Pelvis w  Contrast    (Results Pending)         EKG:    Indication: Chest pain    Performed at: 11:21am  Impression: Sinus rhythm at 76 bpm.  Flipped T waves noted in lead III, aVR and V1.  CO interval 208 ms, QRS 86 ms,  ms.  No ST elevation appreciated.  Nonspecific ST changes compared to June 20, 2023.      I have independently reviewed and interpreted the EKG(s) documented above.        PROCEDURES:  none    Medical Decision Making  Obtained supplemental history:Supplemental history obtained?: No  Reviewed external records: External records reviewed?: Documented in chart and Outpatient Record: see [facility]  Care impacted by chronic illness:Documented in Chart  Did you consider but not order tests?: Work up considered but not performed and documented in chart, if applicable  Did you interpret images independently?: Independent interpretation of ECG and images noted in documentation, when applicable.  Consultation discussion with other provider:Did you involve another provider (consultant, , pharmacy, etc.)?: No  Admission considered. Patient was signed out to the oncoming physician, disposition pending.    MIPS: CT Pulmonary Angiogram:CT PA was ordered for reason(s) other than PE.      Dipti Bland M.D. [facility]  Emergency Medicine and Medical Toxicology  Formerly [facility] EMERGENCY DEPARTMENT  [facility] [facility] [id number] [phone]  Dept: [phone]           Dipti Bland MD  01/26/25 1529       Dipti Bland MD  01/26/25 9834

## 2025-01-26 NOTE — PHARMACY-ADMISSION MEDICATION HISTORY
Pharmacist Admission Medication History    Admission medication history is complete. The information provided in this note is only as accurate as the sources available at the time of the update.    Information Source(s): Patient, Facility (U/NH/) medication list/MAR, and CareEverywhere/SureScripts via in-person and paper copy    Pertinent Information: Spoke to patient who stated that the last time he took his medications was this morning    Changes made to PTA medication list:  Added: None  Deleted: Tylenol, fish oil, multivitamin, ticagrelor, warfarin  Changed: None    Allergies reviewed with patient and updates made in EHR: yes    Medication History Completed By: Angel Peña RPH 1/26/2025 12:52 PM    PTA Med List   Medication Sig Last Dose/Taking    atorvastatin (LIPITOR) 80 MG tablet Take 1 tablet (80 mg) by mouth every evening 1/26/2025 Morning    lisinopril (ZESTRIL) 10 MG tablet Take 1 tablet (10 mg) by mouth daily. *PLEASE SCHEDULE APPT. FOR REFILLS [phone]* 1/26/2025 Morning    metoprolol succinate ER (TOPROL XL) 25 MG 24 hr tablet Take 1 tablet (25 mg) by mouth daily 1/26/2025 Morning    traZODone (DESYREL) 50 MG tablet [TRAZODONE (DESYREL) 50 MG TABLET] Take 50 mg by mouth at bedtime. 1/26/2025 Morning

## 2025-01-26 NOTE — ED NOTES
Bed: [facility]  Expected date:   Expected time:   Means of arrival:   Comments:  [place] - 79M Chest Pain/ EKG unremarkable

## 2025-01-27 LAB
ATRIAL RATE - MUSE: 76 BPM
DIASTOLIC BLOOD PRESSURE - MUSE: 55 MMHG
INTERPRETATION ECG - MUSE: NORMAL
P AXIS - MUSE: 41 DEGREES
PR INTERVAL - MUSE: 208 MS
QRS DURATION - MUSE: 86 MS
QT - MUSE: 362 MS
QTC - MUSE: 407 MS
R AXIS - MUSE: -14 DEGREES
SYSTOLIC BLOOD PRESSURE - MUSE: 118 MMHG
T AXIS - MUSE: 0 DEGREES
VENTRICULAR RATE- MUSE: 76 BPM

## 2025-04-16 ENCOUNTER — HOSPITAL ENCOUNTER (EMERGENCY)
Facility: HOSPITAL | Age: 80
Discharge: HOME | End: 2025-04-16
Attending: EMERGENCY MEDICINE | Admitting: EMERGENCY MEDICINE
Payer: COMMERCIAL

## 2025-04-16 VITALS
TEMPERATURE: 97.6 F | OXYGEN SATURATION: 97 % | HEART RATE: 72 BPM | SYSTOLIC BLOOD PRESSURE: 178 MMHG | HEIGHT: 70 IN | DIASTOLIC BLOOD PRESSURE: 90 MMHG | WEIGHT: 229 LBS | RESPIRATION RATE: 20 BRPM | BODY MASS INDEX: 32.78 KG/M2

## 2025-04-16 DIAGNOSIS — H53.9 TRANSIENT VISION DISTURBANCE OF BOTH EYES: ICD-10-CM

## 2025-04-16 DIAGNOSIS — H53.8 BLURRED VISION: Primary | ICD-10-CM

## 2025-04-16 DIAGNOSIS — R42 DIZZINESS: ICD-10-CM

## 2025-04-16 LAB
ANION GAP SERPL CALCULATED.3IONS-SCNC: 9 MMOL/L (ref 7–15)
APTT PPP: 32 SECONDS (ref 22–38)
BASOPHILS # BLD AUTO: 0 10E3/UL (ref 0–0.2)
BASOPHILS NFR BLD AUTO: 0 %
BUN SERPL-MCNC: 14.6 MG/DL (ref 8–23)
CALCIUM SERPL-MCNC: 10.2 MG/DL (ref 8.8–10.4)
CHLORIDE SERPL-SCNC: 105 MMOL/L (ref 98–107)
CREAT SERPL-MCNC: 1 MG/DL (ref 0.67–1.17)
EGFRCR SERPLBLD CKD-EPI 2021: 76 ML/MIN/1.73M2
EOSINOPHIL # BLD AUTO: 0.2 10E3/UL (ref 0–0.7)
EOSINOPHIL NFR BLD AUTO: 2 %
ERYTHROCYTE [DISTWIDTH] IN BLOOD BY AUTOMATED COUNT: 14.6 % (ref 10–15)
GLUCOSE SERPL-MCNC: 123 MG/DL (ref 70–99)
HCO3 SERPL-SCNC: 26 MMOL/L (ref 22–29)
HCT VFR BLD AUTO: 43.6 % (ref 40–53)
HGB BLD-MCNC: 14.4 G/DL (ref 13.3–17.7)
HOLD SPECIMEN: NORMAL
HOLD SPECIMEN: NORMAL
IMM GRANULOCYTES # BLD: 0.1 10E3/UL
IMM GRANULOCYTES NFR BLD: 1 %
INR PPP: 1 (ref 0.85–1.15)
LYMPHOCYTES # BLD AUTO: 1.1 10E3/UL (ref 0.8–5.3)
LYMPHOCYTES NFR BLD AUTO: 14 %
MCH RBC QN AUTO: 28 PG (ref 26.5–33)
MCHC RBC AUTO-ENTMCNC: 33 G/DL (ref 31.5–36.5)
MCV RBC AUTO: 85 FL (ref 78–100)
MONOCYTES # BLD AUTO: 0.4 10E3/UL (ref 0–1.3)
MONOCYTES NFR BLD AUTO: 6 %
NEUTROPHILS # BLD AUTO: 6.1 10E3/UL (ref 1.6–8.3)
NEUTROPHILS NFR BLD AUTO: 77 %
NRBC # BLD AUTO: 0 10E3/UL
NRBC BLD AUTO-RTO: 0 /100
PLATELET # BLD AUTO: 127 10E3/UL (ref 150–450)
POTASSIUM SERPL-SCNC: 4.7 MMOL/L (ref 3.4–5.3)
RBC # BLD AUTO: 5.14 10E6/UL (ref 4.4–5.9)
SODIUM SERPL-SCNC: 140 MMOL/L (ref 135–145)
TROPONIN T SERPL HS-MCNC: 11 NG/L
TROPONIN T SERPL HS-MCNC: 12 NG/L
WBC # BLD AUTO: 7.9 10E3/UL (ref 4–11)

## 2025-04-16 PROCEDURE — 99285 EMERGENCY DEPT VISIT HI MDM: CPT | Mod: 25

## 2025-04-16 PROCEDURE — 85048 AUTOMATED LEUKOCYTE COUNT: CPT | Performed by: EMERGENCY MEDICINE

## 2025-04-16 PROCEDURE — 250N000011 HC RX IP 250 OP 636: Performed by: EMERGENCY MEDICINE

## 2025-04-16 PROCEDURE — 80048 BASIC METABOLIC PNL TOTAL CA: CPT | Performed by: EMERGENCY MEDICINE

## 2025-04-16 PROCEDURE — 250N000009 HC RX 250: Performed by: EMERGENCY MEDICINE

## 2025-04-16 PROCEDURE — 85610 PROTHROMBIN TIME: CPT | Performed by: EMERGENCY MEDICINE

## 2025-04-16 PROCEDURE — 250N000013 HC RX MED GY IP 250 OP 250 PS 637: Performed by: EMERGENCY MEDICINE

## 2025-04-16 PROCEDURE — 85730 THROMBOPLASTIN TIME PARTIAL: CPT | Performed by: EMERGENCY MEDICINE

## 2025-04-16 PROCEDURE — 84484 ASSAY OF TROPONIN QUANT: CPT | Performed by: EMERGENCY MEDICINE

## 2025-04-16 PROCEDURE — 36415 COLL VENOUS BLD VENIPUNCTURE: CPT | Performed by: EMERGENCY MEDICINE

## 2025-04-16 PROCEDURE — 93005 ELECTROCARDIOGRAM TRACING: CPT | Performed by: EMERGENCY MEDICINE

## 2025-04-16 PROCEDURE — 255N000002 HC RX 255 OP 636: Performed by: NURSE PRACTITIONER

## 2025-04-16 PROCEDURE — 85004 AUTOMATED DIFF WBC COUNT: CPT | Performed by: EMERGENCY MEDICINE

## 2025-04-16 PROCEDURE — A9585 GADOBUTROL INJECTION: HCPCS | Performed by: NURSE PRACTITIONER

## 2025-04-16 RX ORDER — MECLIZINE HYDROCHLORIDE 25 MG/1
25 TABLET ORAL 3 TIMES DAILY PRN
Qty: 20 TABLET | Refills: 0 | Status: SHIPPED | OUTPATIENT
Start: 2025-04-16

## 2025-04-16 RX ORDER — IOPAMIDOL 755 MG/ML
117 INJECTION, SOLUTION INTRAVASCULAR ONCE
Status: COMPLETED | OUTPATIENT
Start: 2025-04-16 | End: 2025-04-16

## 2025-04-16 RX ORDER — MECLIZINE HCL 12.5 MG 12.5 MG/1
25 TABLET ORAL ONCE
Status: COMPLETED | OUTPATIENT
Start: 2025-04-16 | End: 2025-04-16

## 2025-04-16 RX ORDER — GADOBUTROL 604.72 MG/ML
11 INJECTION INTRAVENOUS ONCE
Status: COMPLETED | OUTPATIENT
Start: 2025-04-16 | End: 2025-04-16

## 2025-04-16 RX ADMIN — IOPAMIDOL 117 ML: 755 INJECTION, SOLUTION INTRAVENOUS at 08:08

## 2025-04-16 RX ADMIN — MECLIZINE HYDROCHLORIDE 25 MG: 12.5 TABLET ORAL at 08:20

## 2025-04-16 RX ADMIN — SODIUM CHLORIDE 100 ML: 9 INJECTION, SOLUTION INTRAVENOUS at 08:09

## 2025-04-16 RX ADMIN — GADOBUTROL 11 ML: 604.72 INJECTION INTRAVENOUS at 09:56

## 2025-04-16 ASSESSMENT — ACTIVITIES OF DAILY LIVING (ADL)
ADLS_ACUITY_SCORE: 58

## 2025-04-16 NOTE — CONSULTS
[facility]    Stroke Telephone Note    I was called by Barbara Fournier on 04/16/25 regarding patient Kimani Glover. The patient is a 79 year old male who presented to the ED today with vertigo and blurry vision. LKW was yesterday at 2000; he woke at 0300 with symptoms. He also has chest pain but it is chronic.     Patient has a PMHx significant for pAF, thrombocytopenia, HTN, HLD, CAD, diabetes, hyponatremia. He is on atorvastatin PTA, not on antiplatelet or anticoagulation PTA per chart review.     Vitals      Pulse: 77   Resp: 24       Weight: 103.9 kg (229 lb)    Stroke Code Data (for stroke code without tele)  Stroke code activated 04/16/25  0747   Stroke provider first response 04/16/25  0750   Last known normal 04/15/25  2000      Time of discovery (or onset of symptoms) 04/16/25  0300   Head CT read by Stroke Neuro Provider 04/16/25  0811   Was stroke code de-escalated? Yes  04/16/25  0819     Imaging Findings  CT head: No acute pathology   CTA head/neck: poor bolus timing, no obvious LVO, possible left PCA severe stenosis     Intravenous Thrombolysis  Not given due to:   - unclear or unfavorable risk-benefit profile for extended window thrombolysis beyond the conventional 4.5 hour time window    Endovascular Treatment  Not initiated due to absence of proximal vessel occlusion    Impression  Vertigo and blurry vision: stroke vs hypertensive urgency vs peripheral vertigo    Recommendations   - MRI brain w/ and w/o contrast with coronal DWI and thin cuts through brainstem, ordered   - MRA COW w/o contrast, MRA neck w/ and w/o contrast, ordered     Case discussed with vascular neurology attending Dr. Robins.    My recommendations are based on the information provided over the phone by Kimani Glover's in-person providers. They are not intended to replace the clinical judgment of his in-person providers. I was not requested to personally see or examine the patient at this time.  "    Winifred Lewis, NP  Vascular Neurology    To page me or covering stroke neurology team member, click here: AMCOM  Choose \"On Call\" tab at top, then select \"NEUROLOGY/ALL SITES\" from middle drop-down box, press Enter, then look for \"stroke\" or \"telestroke\" for your site.   "

## 2025-04-16 NOTE — ED TRIAGE NOTES
"Pt arrives via [facility] from [facility] with c/o chest pain and vision changes. Pt reports he woke up around 3am with chest pressure and head \"pressure\" with associated blurry vision. On arrival, pt also endorses dizziness. Provider called for neuro eval on arrival. A&Ox4 with no overt deficits noted.        "

## 2025-04-16 NOTE — ED NOTES
Bed: JN-24  Expected date:   Expected time:   Means of arrival:   Comments:  [facility] - 79M HA, vision change

## 2025-04-16 NOTE — DISCHARGE INSTRUCTIONS
Your evaluated today due to bilateral blurry vision, chest discomfort, and dizziness versus lightheadedness.  You had many tests including blood work, brain images including CTs and MRI, and fortunately there are no signs of anything like a stroke, brain tumor, or any other problem in the brain causing all of this.  We did give some vertigo medicine here which seemed to improve your symptoms, so I am also giving you a prescription for this for home and if you feel any of these symptoms try taking 1 of these.  There is also a referral in for you to see an eye doctor to ensure that there are no problems in the eyes themselves with the vision changes today.  Stroke neurology wants to be sure that you continue to take a baby aspirin daily due to some mild narrowing in the blood vessels in your neck which is something preventative.

## 2025-04-16 NOTE — ED PROVIDER NOTES
EMERGENCY DEPARTMENT ENCOUNTER     NAME: Kimani Glover   AGE: 79 year old male   YOB: 1945   MRN: [id number]   EVALUATION DATE & TIME: 4/16/2025  7:44 AM   PCP: Gorge Gandhi     Chief Complaint   Patient presents with    Chest Pain    Eye Problem   :    FINAL IMPRESSION       1. Blurred vision    2. Dizziness    3. Transient vision disturbance of both eyes           ED COURSE & MEDICAL DECISION MAKING      Pertinent Labs & Imaging studies reviewed. (See chart for details)   79 year old male  presents to the Emergency Department for evaluation of multiple issues, but noting that he went to bed feeling normal at about 8:00 last night, woke up at about 3 AM feeling foggy bilateral blurred vision as well as dizziness described as both lightheadedness and vertigo.  He also initially mentioned chest discomfort to EMS but he notes to me that this is actually been present consistently ever since his heart attack, which on chart review was in 2023. Initial Vitals Reviewed. Initial exam notable for generally well-appearing male who has an NIH stroke scale of 0, no pronator drift, no focal neurologic deficit, no nystagmus but does note that he feels a vision change described as there being a foggy screen in front of his vision bilaterally.  This does not change with covering of 1 eye.  Because of symptoms that would be more significant including both vision and vertigo I did end up calling a tier 2 stroke code though he was out of the window for TNK.  Initial CT and CTA are negative for bleed or large vessel occlusion.  Case discussed several times with the stroke neurology team, MRI obtained and is reassuring.  Labs do not show anything like ACS and I am not suspicious of this given 2 negative troponins and patient's ongoing symptoms for what sounds like almost 2 years.  When I reassessed him after some meclizine he feels improved, is ambulating with his assist device without assistance from  another person, and actually notes that the vision has almost completely normalized as well.  At this time, stroke neurology feels comfortable with patient being discharged with outpatient ophthalmology referral, ongoing baby aspirin, which he already takes daily, return precautions, and I did also add a prescription for meclizine as it seems to have helped here.  This may be something like peripheral vertigo and patient and family are comfortable with discharge.    7:51 AM Spoke with stroke neurology.   11:14 AM Re-evaluated patient at bedside.           At the conclusion of the encounter I discussed the results of all of the tests and the disposition. The questions were answered. The patient or family acknowledged understanding and was agreeable with the care plan.     0 minutes critical care time, see procedure note below for details if relevant    Medical Decision Making  I obtained history from EMS  I reviewed the EMR: Inpatient Record: Admission for STEMI June 2023  Care impacted by Heart Disease, Hyperlipidemia, Hypertension, and Mental Health  I discussed the care with another health care provider: Stroke neurology, radiology  Discharge. I prescribed additional prescription strength medication(s) as charted. I considered admission, but ultimately discharged patient with reassuring evaluation, stroke neurology consultation.    MIPS (CTPE, Dental pain, Mckenzie, Sinusitis, Asthma/COPD, Head Trauma): Not Applicable    SEPSIS: The patient has stroke symptoms:         ED Stroke specific documentation           NIHSS PDF     Patient last known well time: 8pm 4/15/25  ED Provider first to bedside at: 7:45am  CT Results received at: 8:22am    Thrombolytics:   Not given due to:   - minor/isolated/quickly resolving symptoms  - unclear or unfavorable risk-benefit profile for extended window thrombolysis beyond the conventional 4.5 hour time window    If treating with thrombolytics: Ensure SBP<180 and DBP<105 prior to  treatment with thrombolytics.  Administering thrombolytics after treatment with IV labetalol, hydralazine, or nicardipine is reasonable once BP control is established.    Endovascular Retrieval:  Not initiated due to absence of proximal vessel occlusion    National Institutes of Health Stroke Scale (Baseline)  Time Performed: 7:45am     Score    Level of consciousness: (0)   Alert, keenly responsive    LOC questions: (0)   Answers both questions correctly    LOC commands: (0)   Performs both tasks correctly    Best gaze: (0)   Normal    Visual: (0)   No visual loss    Facial palsy: (0)   Normal symmetrical movements    Motor arm (left): (0)   No drift    Motor arm (right): (0)   No drift    Motor leg (left): (0)   No drift    Motor leg (right): (0)   No drift    Limb ataxia: (0)   Absent    Sensory: (0)   Normal- no sensory loss    Best language: (0)   Normal- no aphasia    Dysarthria: (0)   Normal    Extinction and inattention: (0)   No abnormality        Total Score:  0        Stroke Mimics were considered (including migraine headache, seizure disorder, hypoglycemia (or hyperglycemia), head or spinal trauma, CNS infection, Toxin ingestion and shock state (e.g. sepsis) .                              MEDICATIONS GIVEN IN THE EMERGENCY:   Medications - No data to display   NEW PRESCRIPTIONS STARTED AT TODAY'S ER VISIT   New Prescriptions    No medications on file     ================================================================   HISTORY OF PRESENT ILLNESS       Patient information was obtained from: Patient   Use of Intrepreter: N/A   Kimani LAWS Adalberto is a 79 year old male with history of hyperlipidemia, hypertension, paroxysmal atrial fibrillation, myocardial infarction, who presents for blurry vision and dizziness     Per patient:  He arrives via EMS from [facility]. He went to sleep last night (04/15/2025) at 2000 (~12 hours ago) at baseline, he denied vision changes or any other symptoms. Since  "being woken up earlier today (04/16/2025) at 0300 (~5 hours ago) he has endorsed bilateral blurry vision described as a \"foggy screen\" along with a dizziness described as \"gentle movement\". He denies headache or upper or lower extremity weakness. Of note, since his myocardial infarction in June of 2003 (~22 years ago), he has endorsed ongoing chest tightness described as \"not really that bad\" with no recent change. He denies any other symptoms at this time.        ================================================================        PAST HISTORY     PAST MEDICAL HISTORY:   No past medical history on file.   PAST SURGICAL HISTORY:   Past Surgical History:   Procedure Laterality Date    CV CORONARY ANGIOGRAM N/A 6/11/2023    Procedure: Coronary Angiogram;  Surgeon: Filemon Leavitt MD;  Location: [facility]    CV CORONARY ANGIOGRAM N/A 6/11/2023    Procedure: Coronary Angiogram;  Surgeon: Derek Mullen MD;  Location: [facility] CARDIAC CATH LAB    CV INTRA AORTIC BALLOON N/A 6/11/2023    Procedure: Intraprocedure Aortic Balloon Pump Insertion;  Surgeon: Filemon Leavitt MD;  Location: [facility]    CV PCI ASPIRATION THROMECTOMY N/A 6/11/2023    Procedure: Percutaneous Coronary Intervention Aspiration Thrombectomy;  Surgeon: Filemon Leavitt MD;  Location: [facility]    CV PCI STENT DRUG ELUTING N/A 6/11/2023    Procedure: Percutaneous Coronary Intervention Stent;  Surgeon: Filemon Leavitt MD;  Location: [facility]    CV TEMPORARY PACEMAKER INSERTION N/A 6/11/2023    Procedure: Temporary Pacemaker Insertion;  Surgeon: Filemon Leavitt MD;  Location: [facility] CV    LAPAROSCOPIC APPENDECTOMY N/A 9/2/2022    Procedure: APPENDECTOMY, LAPAROSCOPIC;  Surgeon: Robi Carmona MD;  Location: [facility] Main OR      CURRENT MEDICATIONS:   atorvastatin (LIPITOR) 80 MG tablet  lisinopril (ZESTRIL) 10 MG tablet  metoprolol succinate ER (TOPROL XL) 25 MG 24 hr " tablet  traZODone (DESYREL) 50 MG tablet      ALLERGIES:   No Known Allergies   FAMILY HISTORY:   No family history on file.   SOCIAL HISTORY:   Social History     Socioeconomic History    Marital status: Single   Tobacco Use    Smoking status: Never    Smokeless tobacco: Never   Substance and Sexual Activity    Alcohol use: Yes     Alcohol/week: 15.0 standard drinks of alcohol    Drug use: Never    Sexual activity: Yes     Partners: Female     Birth control/protection: Condom     Social Drivers of Health     Financial Resource Strain: Low Risk  (11/13/2024)    Received from [employer] [facility]    Financial Resource Strain     Difficulty of Paying Living Expenses: 3   Food Insecurity: No Food Insecurity (11/13/2024)    [facility]    Food Insecurity     Do you worry your food will run out before you are able to buy more?: 1   Transportation Needs: No Transportation Needs (11/13/2024)    Received from [facility] [employer] [facility]    Transportation Needs     Does lack of transportation keep you from medical appointments?: 1     Does lack of transportation keep you from work, meetings or getting things that you need?: 1   Social Connections: Socially Integrated (11/13/2024)    [facility]    Social Connections     Do you often feel lonely or isolated from those around you?: 0   Housing Stability: Low Risk  (11/13/2024)    Received from [facility] [facility]    Housing Stability     What is your housing situation today?: 1        VITALS  Patient Vitals for the past 24 hrs:   BP Temp Temp src Pulse Resp SpO2 Height Weight   04/16/25 1131 (!) 178/90 -- -- 72 -- 97 % -- --   04/16/25 1100 (!) 144/75 -- -- 67 -- 99 % -- --   04/16/25 1030 (!) 194/92 -- -- 76 -- 97 % -- --   04/16/25 0900 -- -- -- 69 20 98 % -- --   04/16/25 0845 -- -- -- 73 19 99 % -- --   04/16/25  "0815 (!) 206/89 97.6  F (36.4  C) Oral 77 24 100 % -- --   04/16/25 0814 -- -- -- -- -- -- 1.778 m (5' 10\") 103.9 kg (229 lb)   04/16/25 0745 -- -- -- 75 20 97 % -- --        ================================================================    PHYSICAL EXAM     VITAL SIGNS: BP (!) 178/90   Pulse 72   Temp 97.6  F (36.4  C) (Oral)   Resp 20   Ht 1.778 m (5' 10\")   Wt 103.9 kg (229 lb)   SpO2 97%   BMI 32.86 kg/m     Constitutional:  Awake, no acute distress   HENT:  Atraumatic, oropharynx without exudate or erythema, membranes moist  Lymph:  No adenopathy  Eyes: EOM intact, PERRL, no injection  Neck: Supple  Respiratory:  Clear to auscultation bilaterally, no wheezes or crackles   Cardiovascular:  Regular rate and rhythm, single S1 and S2   GI:  Soft, nontender, nondistended, no rebound or guarding   Musculoskeletal:  Moves all extremities, no lower extremity edema, no deformities    Skin:  Warm, dry  Neurologic:  Alert and oriented x3, no focal deficits noted       ================================================================  LAB       All pertinent labs reviewed and interpreted.   Labs Ordered and Resulted from Time of ED Arrival to Time of ED Departure   BASIC METABOLIC PANEL - Abnormal       Result Value    Sodium 140      Potassium 4.7      Chloride 105      Carbon Dioxide (CO2) 26      Anion Gap 9      Urea Nitrogen 14.6      Creatinine 1.00      GFR Estimate 76      Calcium 10.2      Glucose 123 (*)    CBC WITH PLATELETS AND DIFFERENTIAL - Abnormal    WBC Count 7.9      RBC Count 5.14      Hemoglobin 14.4      Hematocrit 43.6      MCV 85      MCH 28.0      MCHC 33.0      RDW 14.6      Platelet Count 127 (*)     % Neutrophils 77      % Lymphocytes 14      % Monocytes 6      % Eosinophils 2      % Basophils 0      % Immature Granulocytes 1      NRBCs per 100 WBC 0      Absolute Neutrophils 6.1      Absolute Lymphocytes 1.1      Absolute Monocytes 0.4      Absolute Eosinophils 0.2      Absolute " Basophils 0.0      Absolute Immature Granulocytes 0.1      Absolute NRBCs 0.0     INR - Normal    INR 1.00     PARTIAL THROMBOPLASTIN TIME - Normal    aPTT 32     TROPONIN T, HIGH SENSITIVITY - Normal    Troponin T, High Sensitivity 12     TROPONIN T, HIGH SENSITIVITY - Normal    Troponin T, High Sensitivity 11     GLUCOSE MONITOR NURSING POCT        ===============================================================  RADIOLOGY       Reviewed all pertinent imaging. Please see official radiology report.   MR Brain w/o & w Contrast   Final Result   IMPRESSION:      HEAD MRI:   1.  No acute intracranial process.   2.  Mild presumed age-related changes of the brain, as described.      HEAD MRA:   1.  Apparent segmental loss of the normal flow-related enhancement in an inferior division left MCA M2 branch, favored to be due to artifact. This vessel demonstrated normal opacification on the previous CTA and also shows normal contrast opacification    on the postcontrast MRA images from MRA neck exam of same session.   2.  Otherwise, no high-grade stenosis or large vessel occlusion of the major intracranial arteries.   3.  No intracranial aneurysm or high-flow vascular malformation.      NECK MRA:   1.  Mild/moderate focal stenosis of the origin of the right vertebral artery.   2.  Mild multifocal stenosis of the left vertebral artery.   3.  Otherwise, unremarkable neck MRA.               MRA Brain ([place]) w/o Contrast   Final Result   IMPRESSION:      HEAD MRI:   1.  No acute intracranial process.   2.  Mild presumed age-related changes of the brain, as described.      HEAD MRA:   1.  Apparent segmental loss of the normal flow-related enhancement in an inferior division left MCA M2 branch, favored to be due to artifact. This vessel demonstrated normal opacification on the previous CTA and also shows normal contrast opacification    on the postcontrast MRA images from MRA neck exam of same session.   2.  Otherwise,  no high-grade stenosis or large vessel occlusion of the major intracranial arteries.   3.  No intracranial aneurysm or high-flow vascular malformation.      NECK MRA:   1.  Mild/moderate focal stenosis of the origin of the right vertebral artery.   2.  Mild multifocal stenosis of the left vertebral artery.   3.  Otherwise, unremarkable neck MRA.               MRA Neck ([place]) wo & w Contrast   Final Result   IMPRESSION:      HEAD MRI:   1.  No acute intracranial process.   2.  Mild presumed age-related changes of the brain, as described.      HEAD MRA:   1.  Apparent segmental loss of the normal flow-related enhancement in an inferior division left MCA M2 branch, favored to be due to artifact. This vessel demonstrated normal opacification on the previous CTA and also shows normal contrast opacification    on the postcontrast MRA images from MRA neck exam of same session.   2.  Otherwise, no high-grade stenosis or large vessel occlusion of the major intracranial arteries.   3.  No intracranial aneurysm or high-flow vascular malformation.      NECK MRA:   1.  Mild/moderate focal stenosis of the origin of the right vertebral artery.   2.  Mild multifocal stenosis of the left vertebral artery.   3.  Otherwise, unremarkable neck MRA.               CT Head Perfusion w Contrast - For Tier 2 Stroke   Final Result      CTA Head Neck with Contrast   Final Result   IMPRESSION:    HEAD CT:   1.  No acute intracranial process.      HEAD CTA:    1.  No large vessel occlusion.   2.  Questionable high-grade stenosis versus poor bolus timing of the left posterior cervical artery as described in the body of the report. A contrast enhanced MRI is recommended for further evaluation      NECK CTA:   1.  No hemodynamically significant stenosis in the neck vessels.    2.  No evidence for dissection.      The findings of no acute intracranial pathology and no large vessel occlusion were made to Dr. Fournier at 8:10 AM on 04/16/2025 by  Ezequiel Pandey.            ================================================================  EKG     EKG reviewed interpreted by me shows sinus rhythm with rate of 76, first-degree AV block with WI interval 268, normal axis, QTc 387 with no acute ST or T wave changes since January 26    I have independently reviewed and interpreted the EKG(s) documented above.     ================================================================  PROCEDURES         I, Christiano Palomino, am serving as a scribe to document services personally performed by Dr. Fournier based on my observation and the provider's statements to me. I, Barbara Fournier MD attest that Christiano Palomino is acting in a scribe capacity, has observed my performance of the services and has documented them in accordance with my direction.   Barbara Fournier M.D.   Emergency Medicine   [facility] EMERGENCY DEPARTMENT  [facility] [address]  [phone]  Dept: [phone]       Barbara Fournier MD  04/16/25 7212

## 2025-04-16 NOTE — PROGRESS NOTES
Interval Vascular Neurology Note    MRI does not show stroke. He does have mild/moderate bilateral vertebral artery stenosis which would warrant initiation of antiplatelet therapy, recommend aspirin 81 mg daily. He is already on atorvastatin 80 mg daily. Recommend tight blood pressure control while avoiding hypotension given the vert stenosis. No need to admit for further work up from a cerebrovascular perspective, defer to ED provider whether or not he requires further work up or therapies for symptoms. I have placed an ophthalmology referral for his blurry vision.     Winifred Lewis NP

## 2025-04-17 ENCOUNTER — TELEPHONE (OUTPATIENT)
Dept: OPHTHALMOLOGY | Facility: CLINIC | Age: 80
End: 2025-04-17
Payer: COMMERCIAL

## 2025-04-21 ENCOUNTER — TELEPHONE (OUTPATIENT)
Dept: OPHTHALMOLOGY | Facility: CLINIC | Age: 80
End: 2025-04-21
Payer: COMMERCIAL

## 2025-04-22 LAB
ATRIAL RATE - MUSE: 76 BPM
DIASTOLIC BLOOD PRESSURE - MUSE: 89 MMHG
INTERPRETATION ECG - MUSE: NORMAL
P AXIS - MUSE: 71 DEGREES
PR INTERVAL - MUSE: 268 MS
QRS DURATION - MUSE: 86 MS
QT - MUSE: 344 MS
QTC - MUSE: 387 MS
R AXIS - MUSE: 0 DEGREES
SYSTOLIC BLOOD PRESSURE - MUSE: 206 MMHG
T AXIS - MUSE: -49 DEGREES
VENTRICULAR RATE- MUSE: 76 BPM

## 2025-04-23 ENCOUNTER — PATIENT OUTREACH (OUTPATIENT)
Dept: CARE COORDINATION | Facility: CLINIC | Age: 80
End: 2025-04-23
Payer: COMMERCIAL

## 2025-05-06 ENCOUNTER — TELEPHONE (OUTPATIENT)
Dept: OPHTHALMOLOGY | Facility: CLINIC | Age: 80
End: 2025-05-06
Payer: COMMERCIAL

## 2025-05-13 ENCOUNTER — OFFICE VISIT (OUTPATIENT)
Dept: URGENT CARE | Facility: URGENT CARE | Age: 80
End: 2025-05-13
Payer: COMMERCIAL

## 2025-05-13 VITALS
OXYGEN SATURATION: 97 % | DIASTOLIC BLOOD PRESSURE: 82 MMHG | TEMPERATURE: 97.3 F | SYSTOLIC BLOOD PRESSURE: 148 MMHG | HEART RATE: 74 BPM | RESPIRATION RATE: 20 BRPM

## 2025-05-13 DIAGNOSIS — N50.812 LEFT TESTICULAR PAIN: Primary | ICD-10-CM

## 2025-05-13 PROCEDURE — 99213 OFFICE O/P EST LOW 20 MIN: CPT | Performed by: PHYSICIAN ASSISTANT

## 2025-05-13 RX ORDER — CLOTRIMAZOLE AND BETAMETHASONE DIPROPIONATE 10; .64 MG/G; MG/G
CREAM TOPICAL 2 TIMES DAILY
Qty: 45 G | Refills: 0 | Status: SHIPPED | OUTPATIENT
Start: 2025-05-13 | End: 2025-05-20

## 2025-05-13 RX ORDER — SULFAMETHOXAZOLE AND TRIMETHOPRIM 800; 160 MG/1; MG/1
1 TABLET ORAL 2 TIMES DAILY
Qty: 14 TABLET | Refills: 0 | Status: SHIPPED | OUTPATIENT
Start: 2025-05-13 | End: 2025-05-20

## 2025-05-13 NOTE — PROGRESS NOTES
URGENT CARE VISIT:    SUBJECTIVE:   Kimani Glover is a 79 year old male who presents with left testicle pain since 1 day(s) ago. Pain is burning sensation. Denies fever, abdominal pain, pelvic pain, dysuria, urinary frequency, nausea, and vomiting. Symptoms have been gradually worsening.  Treatment tried include cold packs with some relief of symptoms. He thinks it could be laundry detergent.    No LMP for male patient.    PMH: No past medical history on file.  Allergies: Patient has no known allergies.   Medications:   Current Outpatient Medications   Medication Sig Dispense Refill    atorvastatin (LIPITOR) 80 MG tablet Take 1 tablet (80 mg) by mouth every evening 90 tablet 3    clotrimazole-betamethasone (LOTRISONE) 1-0.05 % external cream Apply topically 2 times daily for 7 days. 45 g 0    lisinopril (ZESTRIL) 10 MG tablet Take 1 tablet (10 mg) by mouth daily. *PLEASE SCHEDULE APPT. FOR REFILLS [phone]* 30 tablet 0    meclizine (ANTIVERT) 25 MG tablet Take 1 tablet (25 mg) by mouth 3 times daily as needed for dizziness. 20 tablet 0    metoprolol succinate ER (TOPROL XL) 25 MG 24 hr tablet Take 1 tablet (25 mg) by mouth daily 90 tablet 3    sulfamethoxazole-trimethoprim (BACTRIM DS) 800-160 MG tablet Take 1 tablet by mouth 2 times daily for 7 days. 14 tablet 0    traZODone (DESYREL) 50 MG tablet [TRAZODONE (DESYREL) 50 MG TABLET] Take 50 mg by mouth at bedtime.       Social History:   Social History     Tobacco Use    Smoking status: Never    Smokeless tobacco: Never   Substance Use Topics    Alcohol use: Yes     Alcohol/week: 15.0 standard drinks of alcohol         ROS:   Review of systems negative except as stated above.    OBJECTIVE:  BP (!) 148/82   Pulse 74   Temp 97.3  F (36.3  C) (Tympanic)   Resp 20   SpO2 97%   GENERAL APPEARANCE: healthy, alert and no distress  CV: regular rates and rhythm, normal S1 S2, no murmur noted  ABDOMEN:  soft, nontender, no HSM or masses and bowel sounds  normal  BACK: No CVA tenderness  SKIN: no suspicious lesions or rashes  Genitourinary: absent right testicle. Left testicle is diffusely erythematous and sensitive to touch      ASSESSMENT:    ICD-10-CM    1. Left testicular pain  N50.812 sulfamethoxazole-trimethoprim (BACTRIM DS) 800-160 MG tablet     clotrimazole-betamethasone (LOTRISONE) 1-0.05 % external cream          PLAN:  Patient Instructions   Patient was educated on the natural course of rash.  I suspect bacterial vs candidiasis. Will treat for both. Take medications as prescribed. Side effects discussed. Conservative measures include cool compresses. See your primary care provider if symptoms worsen or do not improve in 7 days. Seek emergency care if you develop severe pain/redness, shortness of breath, or difficulty swallowing.      Patient verbalized understanding and is agreeable to plan. The patient was discharged ambulatory and in stable condition.    Yaneth Varner PA-C ....................  5/13/2025   10:33 AM

## 2025-05-13 NOTE — PROGRESS NOTES
"Urgent Care Clinic Visit    Chief Complaint   Patient presents with    Testicular Problem     Lt swelling and pain x yesterday. Poss new laundry detergent. Hot to the touch \"feels like sitting on an iron\" with some burning sensation.               5/13/2025    10:13 AM   Additional Questions   Roomed by Ros Mendez MA   Accompanied by Daughter in law         5/13/2025   Declines Weight   Did patient decline having their weight taken? Yes             "

## 2025-05-13 NOTE — PATIENT INSTRUCTIONS
Patient was educated on the natural course of rash.  Take medications as prescribed. Side effects discussed. Conservative measures include cool compresses. See your primary care provider if symptoms worsen or do not improve in 7 days. Seek emergency care if you develop severe pain/redness, shortness of breath, or difficulty swallowing.

## 2025-05-15 ENCOUNTER — MEDICAL CORRESPONDENCE (OUTPATIENT)
Dept: HEALTH INFORMATION MANAGEMENT | Facility: CLINIC | Age: 80
End: 2025-05-15

## 2025-05-15 ENCOUNTER — HOSPITAL ENCOUNTER (EMERGENCY)
Facility: HOSPITAL | Age: 80
Discharge: HOME | End: 2025-05-15
Attending: EMERGENCY MEDICINE
Payer: COMMERCIAL

## 2025-05-15 ENCOUNTER — APPOINTMENT (OUTPATIENT)
Dept: RADIOLOGY | Facility: HOSPITAL | Age: 80
End: 2025-05-15
Attending: EMERGENCY MEDICINE
Payer: COMMERCIAL

## 2025-05-15 VITALS
DIASTOLIC BLOOD PRESSURE: 72 MMHG | HEART RATE: 62 BPM | BODY MASS INDEX: 31.1 KG/M2 | HEIGHT: 69 IN | TEMPERATURE: 98.4 F | OXYGEN SATURATION: 95 % | WEIGHT: 210 LBS | RESPIRATION RATE: 13 BRPM | SYSTOLIC BLOOD PRESSURE: 132 MMHG

## 2025-05-15 DIAGNOSIS — R07.89 ATYPICAL CHEST PAIN: ICD-10-CM

## 2025-05-15 LAB
ANION GAP SERPL CALCULATED.3IONS-SCNC: 7 MMOL/L (ref 7–15)
ATRIAL RATE - MUSE: 71 BPM
BASOPHILS # BLD AUTO: 0.1 10E3/UL (ref 0–0.2)
BASOPHILS NFR BLD AUTO: 1 %
BUN SERPL-MCNC: 14.4 MG/DL (ref 8–23)
CALCIUM SERPL-MCNC: 10.4 MG/DL (ref 8.8–10.4)
CHLORIDE SERPL-SCNC: 103 MMOL/L (ref 98–107)
CREAT SERPL-MCNC: 1.18 MG/DL (ref 0.67–1.17)
D DIMER PPP FEU-MCNC: 0.41 UG/ML FEU (ref 0–0.5)
DIASTOLIC BLOOD PRESSURE - MUSE: 74 MMHG
EGFRCR SERPLBLD CKD-EPI 2021: 62 ML/MIN/1.73M2
EOSINOPHIL # BLD AUTO: 0 10E3/UL (ref 0–0.7)
EOSINOPHIL NFR BLD AUTO: 1 %
ERYTHROCYTE [DISTWIDTH] IN BLOOD BY AUTOMATED COUNT: 15 % (ref 10–15)
GLUCOSE SERPL-MCNC: 143 MG/DL (ref 70–99)
HCO3 SERPL-SCNC: 26 MMOL/L (ref 22–29)
HCT VFR BLD AUTO: 44.6 % (ref 40–53)
HGB BLD-MCNC: 14.5 G/DL (ref 13.3–17.7)
IMM GRANULOCYTES # BLD: 0.1 10E3/UL
IMM GRANULOCYTES NFR BLD: 1 %
INTERPRETATION ECG - MUSE: NORMAL
LYMPHOCYTES # BLD AUTO: 0.7 10E3/UL (ref 0.8–5.3)
LYMPHOCYTES NFR BLD AUTO: 9 %
MCH RBC QN AUTO: 27.3 PG (ref 26.5–33)
MCHC RBC AUTO-ENTMCNC: 32.5 G/DL (ref 31.5–36.5)
MCV RBC AUTO: 84 FL (ref 78–100)
MONOCYTES # BLD AUTO: 0.4 10E3/UL (ref 0–1.3)
MONOCYTES NFR BLD AUTO: 4 %
NEUTROPHILS # BLD AUTO: 7.3 10E3/UL (ref 1.6–8.3)
NEUTROPHILS NFR BLD AUTO: 86 %
NRBC # BLD AUTO: 0 10E3/UL
NRBC BLD AUTO-RTO: 0 /100
NT-PROBNP SERPL-MCNC: 639 PG/ML (ref 0–852)
P AXIS - MUSE: 36 DEGREES
PLATELET # BLD AUTO: 147 10E3/UL (ref 150–450)
POTASSIUM SERPL-SCNC: 4.4 MMOL/L (ref 3.4–5.3)
PR INTERVAL - MUSE: 252 MS
QRS DURATION - MUSE: 88 MS
QT - MUSE: 376 MS
QTC - MUSE: 408 MS
R AXIS - MUSE: -4 DEGREES
RBC # BLD AUTO: 5.32 10E6/UL (ref 4.4–5.9)
SODIUM SERPL-SCNC: 136 MMOL/L (ref 135–145)
SYSTOLIC BLOOD PRESSURE - MUSE: 158 MMHG
T AXIS - MUSE: -9 DEGREES
TROPONIN T SERPL HS-MCNC: 10 NG/L
TROPONIN T SERPL HS-MCNC: 11 NG/L
VENTRICULAR RATE- MUSE: 71 BPM
WBC # BLD AUTO: 8.6 10E3/UL (ref 4–11)

## 2025-05-15 PROCEDURE — 85004 AUTOMATED DIFF WBC COUNT: CPT | Performed by: EMERGENCY MEDICINE

## 2025-05-15 PROCEDURE — 93005 ELECTROCARDIOGRAM TRACING: CPT | Performed by: EMERGENCY MEDICINE

## 2025-05-15 PROCEDURE — 84484 ASSAY OF TROPONIN QUANT: CPT | Performed by: EMERGENCY MEDICINE

## 2025-05-15 PROCEDURE — 99285 EMERGENCY DEPT VISIT HI MDM: CPT | Mod: 25

## 2025-05-15 PROCEDURE — 83880 ASSAY OF NATRIURETIC PEPTIDE: CPT | Performed by: EMERGENCY MEDICINE

## 2025-05-15 PROCEDURE — 85379 FIBRIN DEGRADATION QUANT: CPT | Performed by: EMERGENCY MEDICINE

## 2025-05-15 PROCEDURE — 36415 COLL VENOUS BLD VENIPUNCTURE: CPT | Performed by: EMERGENCY MEDICINE

## 2025-05-15 PROCEDURE — 71045 X-RAY EXAM CHEST 1 VIEW: CPT

## 2025-05-15 PROCEDURE — 80048 BASIC METABOLIC PNL TOTAL CA: CPT | Performed by: EMERGENCY MEDICINE

## 2025-05-15 ASSESSMENT — ACTIVITIES OF DAILY LIVING (ADL)
ADLS_ACUITY_SCORE: 58

## 2025-05-15 NOTE — ED NOTES
Pt up to the bathroom with standby assist. Endorses some dizziness while ambulating but states this is similar to his baseline. Chest pressure remains unchanged.

## 2025-05-15 NOTE — ED TRIAGE NOTES
Pt arrives via [facility] from assisted living facility. Woke up this morning with left-sided chest pressure with associated dizziness. States it feels like someone is standing on his chest. Hx stent placement. Alert and oriented.      Triage Assessment (Adult)       Row Name 05/15/25 0858          Triage Assessment    Airway WDL WDL        Respiratory WDL    Respiratory WDL WDL        Skin Circulation/Temperature WDL    Skin Circulation/Temperature WDL WDL        Cardiac WDL    Cardiac WDL X;chest pain        Chest Pain Assessment    Chest Pain Location anterior chest, left     Character pressure     Associated Signs/Symptoms dizziness     Chest Pain Intervention 12-lead ECG obtained;cardiac monitoring continued        Peripheral/Neurovascular WDL    Peripheral Neurovascular WDL WDL        Cognitive/Neuro/Behavioral WDL    Cognitive/Neuro/Behavioral WDL WDL

## 2025-05-15 NOTE — ED NOTES
[facility]  Expected date: 5/15/25  Expected time: 8:45 AM  Means of arrival:   Comments:  Blair/Jacqueline male/chest pain

## 2025-05-15 NOTE — ED PROVIDER NOTES
EMERGENCY DEPARTMENT ENCOUNTER      NAME: Kimani Glover  AGE: 79 year old male  YOB: 1945  MRN: [id number]  EVALUATION DATE & TIME: 5/15/2025  8:55 AM    PCP: Gorge Gandhi    ED PROVIDER: Robi Michelle D.O.      Chief Complaint   Patient presents with    Chest Pain       FINAL IMPRESSION:  1. Atypical chest pain        ED COURSE & MEDICAL DECISION MAKIN:08 AM I met with the patient to gather history and to perform my initial exam. I discussed the plan for care while in the Emergency Department.  12:33 PM Reassessed and updated patient with results. Patient states his pain is gone.  1:37 PM Reevaluated and updated the patient with results. We discussed the plan for discharge and the patient is agreeable. Reviewed supportive cares, symptomatic treatment, outpatient follow up, and reasons to return to the Emergency Department. Patient to be discharged by ED RN.              Pertinent Labs & Imaging studies reviewed. (See chart for details)  79 year old male presents to the Emergency Department for evaluation of left-sided lower chest wall pain.  Differential does include ACS, PE, dissection, pneumothorax, musculoskeletal etiology, other acute process.  Patient did report that the pain would be exacerbated by movement and deep breaths.  EKG did not show any evidence of ischemia or arrhythmia, 2 troponins did come back negative.  D-dimer was negative making PE unlikely.  Chest x-ray did not show any evidence of mediastinal widening that would be suggestive of dissection, nor was there evidence of pneumothorax or consolidation consistent with pneumonia.  As the pain was quite reproducible on my exam, at this time I do suspect musculoskeletal etiology, and the patient did report improvement of his pain while in the ER.  Therefore I believe he can be safely discharged home with outpatient follow-up with his primary care provider and did not require definitive emergent follow-up with  "cardiology.  Patient was comfortable with this plan.  Return precautions were discussed    Medical Decision Making  I considered additional work up, including CT chest, but deferred due to negative D-dimer  Discharge. No recommendations on prescription strength medication(s). I considered admission, but discharged the patient after share decision making conversation.    MIPS (CTPE, Dental pain, Mckenzie, Sinusitis, Asthma/COPD, Head Trauma): Not Applicable    SEPSIS: None          At the conclusion of the encounter I discussed the results of all of the tests and the disposition. The questions were answered. The patient or family acknowledged understanding and was agreeable with the care plan.      HPI    Patient information was obtained from: patient    Use of : N/A      Kimani Glover is a 79 year old male who presents to this ED for evaluation of chest pain.    Patient was sent to this ED via EMS after his house doctor suspected MI. He endorses tightness in his chest, like \"someone is standing on it\", and associated dizziness and blurred vision. He states symptoms began when he woke up this morning. He states he was feeling fine last night. Patient states the pain is worse upon movement or upon inhalation. He denies nausea, vomiting, fever, cough, and congestion. There were no other concerns/complaints at this time.       Per Chart Review:   4/16/2025: Patient presented to [facility] ED for chest pain and eye issues. He woke up in the middle of the night with bilaterally blurred vision, dizziness, and an underlying chest pain. Exam showed NIH stroke scale of 0, no pronator drift, no focal neurologic deficit, no nystagmus but does note that he feels a vision change described as there being a foggy screen in front of his vision bilaterally. Initial CT and CTA are negative for bleed or large vessel occlusion. Labs were unremarkable, 2 negative troponins. Patient was given meclizine and clinically improved " while in the ED. Patient was discharged with meclizine.       PAST MEDICAL HISTORY:  No past medical history on file.    PAST SURGICAL HISTORY:  Past Surgical History:   Procedure Laterality Date    CV CORONARY ANGIOGRAM N/A 6/11/2023    Procedure: Coronary Angiogram;  Surgeon: Filemon Leavitt MD;  Location: [facility] CV    CV CORONARY ANGIOGRAM N/A 6/11/2023    Procedure: Coronary Angiogram;  Surgeon: Derek Mullen MD;  Location:  [facility]ATH LAB    CV INTRA AORTIC BALLOON N/A 6/11/2023    Procedure: Intraprocedure Aortic Balloon Pump Insertion;  Surgeon: Filemon Leavitt MD;  Location: [facility] CV    CV PCI ASPIRATION THROMECTOMY N/A 6/11/2023    Procedure: Percutaneous Coronary Intervention Aspiration Thrombectomy;  Surgeon: Filemon Leavitt MD;  Location: [facility] CV    CV PCI STENT DRUG ELUTING N/A 6/11/2023    Procedure: Percutaneous Coronary Intervention Stent;  Surgeon: Filemon Leavitt MD;  Location: [facility] CV    CV TEMPORARY PACEMAKER INSERTION N/A 6/11/2023    Procedure: Temporary Pacemaker Insertion;  Surgeon: Filemon Leavitt MD;  Location: [facility] CV    LAPAROSCOPIC APPENDECTOMY N/A 9/2/2022    Procedure: APPENDECTOMY, LAPAROSCOPIC;  Surgeon: Robi Carmona MD;  Location: [facility] [place] OR         CURRENT MEDICATIONS:    No current facility-administered medications for this encounter.     Current Outpatient Medications   Medication Sig Dispense Refill    atorvastatin (LIPITOR) 80 MG tablet Take 1 tablet (80 mg) by mouth every evening 90 tablet 3    clotrimazole-betamethasone (LOTRISONE) 1-0.05 % external cream Apply topically 2 times daily for 7 days. 45 g 0    lisinopril (ZESTRIL) 10 MG tablet Take 1 tablet (10 mg) by mouth daily. *PLEASE SCHEDULE APPT. FOR REFILLS [phone]* 30 tablet 0    meclizine (ANTIVERT) 25 MG tablet Take 1 tablet (25 mg) by mouth 3 times daily as needed for dizziness. 20 tablet 0    metoprolol  succinate ER (TOPROL XL) 25 MG 24 hr tablet Take 1 tablet (25 mg) by mouth daily 90 tablet 3    sulfamethoxazole-trimethoprim (BACTRIM DS) 800-160 MG tablet Take 1 tablet by mouth 2 times daily for 7 days. 14 tablet 0    traZODone (DESYREL) 50 MG tablet [TRAZODONE (DESYREL) 50 MG TABLET] Take 50 mg by mouth at bedtime.           ALLERGIES:  No Known Allergies    FAMILY HISTORY:  No family history on file.    SOCIAL HISTORY:  Social History     Socioeconomic History    Marital status: Single   Tobacco Use    Smoking status: Never    Smokeless tobacco: Never   Substance and Sexual Activity    Alcohol use: Yes     Alcohol/week: 15.0 standard drinks of alcohol    Drug use: Never    Sexual activity: Yes     Partners: Female     Birth control/protection: Condom     Social Drivers of Health     Financial Resource Strain: Low Risk  (11/13/2024)    Received from [facility]    Financial Resource Strain     Difficulty of Paying Living Expenses: 3   Food Insecurity: No Food Insecurity (11/13/2024)    [facility]    Food Insecurity     Do you worry your food will run out before you are able to buy more?: 1   Transportation Needs: No Transportation Needs (11/13/2024)    Received from [facility]    Transportation Needs     Does lack of transportation keep you from medical appointments?: 1     Does lack of transportation keep you from work, meetings or getting things that you need?: 1   Social Connections: Socially Integrated (11/13/2024)    [facility]    Social Connections     Do you often feel lonely or isolated from those around you?: 0   Housing Stability: Low Risk  (11/13/2024)    Received from [facility]    Housing Stability     What is your housing situation today?: 1       VITALS:  Patient Vitals for the past 24 hrs:   BP Temp  "Temp src Pulse Resp SpO2 Height Weight   05/15/25 1045 132/72 -- -- 62 13 95 % -- --   05/15/25 1030 (!) 156/80 -- -- 71 26 98 % -- --   05/15/25 1015 (!) 145/80 -- -- 62 23 97 % -- --   05/15/25 1000 (!) 144/76 -- -- 63 22 97 % -- --   05/15/25 0945 137/71 -- -- 64 20 96 % -- --   05/15/25 0930 134/83 -- -- 71 26 95 % -- --   05/15/25 0915 (!) 149/67 -- -- 69 16 99 % -- --   05/15/25 0901 (!) 142/68 98.4  F (36.9  C) Oral -- -- 98 % 1.753 m (5' 9\") 95.3 kg (210 lb)   05/15/25 0900 -- -- -- 72 21 98 % -- --   05/15/25 0856 -- -- -- 75 25 99 % -- --       PHYSICAL EXAM    VITAL SIGNS: /72   Pulse 62   Temp 98.4  F (36.9  C) (Oral)   Resp 13   Ht 1.753 m (5' 9\")   Wt 95.3 kg (210 lb)   SpO2 95%   BMI 31.01 kg/m      General Appearance: Well-appearing, well-nourished, no acute distress   Head:  Normocephalic, without obvious abnormality, atraumatic  Eyes:  PERRL, conjunctiva/corneas clear, EOM's intact,  ENT:  Lips, mucosa, and tongue normal, membranes are moist without pallor  Neck:  Normal ROM, symmetrical, trachea midline    Chest:  Tenderness to palpation in the left lower anterior chest wall, no deformity, no crepitus  Cardio:  Regular rate and rhythm, no murmur, rub or gallop, 2+ pulses symmetric in all extremities  Pulm:  Clear to auscultation bilaterally, respirations unlabored,  Abdomen:  Soft, non-tender, no rebound or guarding.  Musculoskeletal: Full ROM, no edema, no cyanosis, good ROM of major joints  Integument:  Warm, Dry, No erythema, No rash.    Neurologic:  Alert & oriented.  No focal deficits appreciated.    Psychiatric:  Affect normal, Judgment normal, Mood normal.      LABS  Results for orders placed or performed during the hospital encounter of 05/15/25 (from the past 24 hours)   CBC with platelets + differential    Narrative    The following orders were created for panel order CBC with platelets + differential.  Procedure                               Abnormality         Status       "               ---------                               -----------         ------                     CBC with platelets and ...[[id number]]  Abnormal            Final result                 Please view results for these tests on the individual orders.   Basic metabolic panel   Result Value Ref Range    Sodium 136 135 - 145 mmol/L    Potassium 4.4 3.4 - 5.3 mmol/L    Chloride 103 98 - 107 mmol/L    Carbon Dioxide (CO2) 26 22 - 29 mmol/L    Anion Gap 7 7 - 15 mmol/L    Urea Nitrogen 14.4 8.0 - 23.0 mg/dL    Creatinine 1.18 (H) 0.67 - 1.17 mg/dL    GFR Estimate 62 >60 mL/min/1.73m2    Calcium 10.4 8.8 - 10.4 mg/dL    Glucose 143 (H) 70 - 99 mg/dL   Troponin T, High Sensitivity   Result Value Ref Range    Troponin T, High Sensitivity 11 <=22 ng/L   NT-proBNP   Result Value Ref Range    NT-proBNP 639 0 - 852 pg/mL   D dimer quantitative   Result Value Ref Range    D-Dimer Quantitative 0.41 0.00 - 0.50 ug/mL FEU    Narrative    This D-dimer assay is intended for use in conjunction with a clinical pretest probability assessment model to exclude pulmonary embolism (PE) and deep venous thrombosis (DVT) in outpatients suspected of PE or DVT. The cut-off value is 0.50 ug/mL FEU.    For patients 50 years of age or older, the application of age-adjusted cut-off values for D-Dimer may increase the specificity without significant effect on sensitivity. The literature suggested calculation age adjusted cut-off in ug/L = age in years x 10 ug/L. The results in this laboratory are reported as ug/mL rather than ug/L. The calculation for age adjusted cut off in ug/mL= age in years x 0.01 ug/mL. For example, the cut off for a 76 year old male is 74 x 0.01 ug/mL = 0.76 ug/mL (760 ug/L).    M Jimmie et al. Age adjusted D-dimer cut-off levels to rule out pulmonary embolism: The ADJUST-PE Study. MALIK 2014;311:3622-4217.; HJ Cassie et al. Diagnostic accuracy of conventional or age adjusted D-dimer cutoff values in older patients with  suspected venous thromboembolism. Systemic review and meta-analysis. BMJ 2013:346:f2492.   CBC with platelets and differential   Result Value Ref Range    WBC Count 8.6 4.0 - 11.0 10e3/uL    RBC Count 5.32 4.40 - 5.90 10e6/uL    Hemoglobin 14.5 13.3 - 17.7 g/dL    Hematocrit 44.6 40.0 - 53.0 %    MCV 84 78 - 100 fL    MCH 27.3 26.5 - 33.0 pg    MCHC 32.5 31.5 - 36.5 g/dL    RDW 15.0 10.0 - 15.0 %    Platelet Count 147 (L) 150 - 450 10e3/uL    % Neutrophils 86 %    % Lymphocytes 9 %    % Monocytes 4 %    % Eosinophils 1 %    % Basophils 1 %    % Immature Granulocytes 1 %    NRBCs per 100 WBC 0 <1 /100    Absolute Neutrophils 7.3 1.6 - 8.3 10e3/uL    Absolute Lymphocytes 0.7 (L) 0.8 - 5.3 10e3/uL    Absolute Monocytes 0.4 0.0 - 1.3 10e3/uL    Absolute Eosinophils 0.0 0.0 - 0.7 10e3/uL    Absolute Basophils 0.1 0.0 - 0.2 10e3/uL    Absolute Immature Granulocytes 0.1 <=0.4 10e3/uL    Absolute NRBCs 0.0 10e3/uL   XR Chest Port 1 View    Narrative    EXAM: XR CHEST PORT 1 VIEW  LOCATION: [facility]  DATE: 5/15/2025    INDICATION: Chest pain  COMPARISON: Chest CTA 1/26/2025      Impression    IMPRESSION: Heart size and vascularity are normal. Known 8 mm right middle lobe nodule not well visualized by chest radiograph. No focal consolidation, pneumothorax nor pleural effusion.   Troponin T, High Sensitivity   Result Value Ref Range    Troponin T, High Sensitivity 10 <=22 ng/L         RADIOLOGY  XR Chest Port 1 View   Final Result   IMPRESSION: Heart size and vascularity are normal. Known 8 mm right middle lobe nodule not well visualized by chest radiograph. No focal consolidation, pneumothorax nor pleural effusion.             EKG:    Rate: 71 bpm  Rhythm: Normal Sinus Rhythm  Axis: Normal  Interval: prolonged MO  Conduction: First-degree AV block  QRS: Normal  ST: Normal  T-wave: Normal  QT: Not prolonged  Comparison EKG: No significant change when compared to 16 April 2025  Impression:  No acute  ischemic change   I have independently reviewed and interpreted today's EKG, pending Cardiologist read          MEDICATIONS GIVEN IN THE EMERGENCY:  Medications - No data to display    NEW PRESCRIPTIONS STARTED AT TODAY'S ER VISIT  New Prescriptions    No medications on file        I, Mg Emery, am serving as a scribe to document services personally performed by Robi Michelle D.O., based on my observations and the provider's statements to me.  I, Robi Michelle D.O., attest that Mg Emery is acting in a scribe capacity, has observed my performance of the services and has documented them in accordance with my direction.     Robi Michelle D.O.  Emergency Medicine  [facility] EMERGENCY DEPARTMENT  [facility] [facility] [id number] [phone]  Dept: [phone]       Robi Michelle,   05/15/25 1421

## 2025-05-17 ENCOUNTER — HEALTH MAINTENANCE LETTER (OUTPATIENT)
Age: 80
End: 2025-05-17

## 2025-05-19 ENCOUNTER — LAB REQUISITION (OUTPATIENT)
Dept: LAB | Facility: CLINIC | Age: 80
End: 2025-05-19
Payer: COMMERCIAL

## 2025-05-19 DIAGNOSIS — R60.0 LOCALIZED EDEMA: ICD-10-CM

## 2025-05-20 LAB
ANION GAP SERPL CALCULATED.3IONS-SCNC: 10 MMOL/L (ref 7–15)
BUN SERPL-MCNC: 23.5 MG/DL (ref 8–23)
CALCIUM SERPL-MCNC: 10.3 MG/DL (ref 8.8–10.4)
CHLORIDE SERPL-SCNC: 106 MMOL/L (ref 98–107)
CREAT SERPL-MCNC: 0.94 MG/DL (ref 0.67–1.17)
EGFRCR SERPLBLD CKD-EPI 2021: 81 ML/MIN/1.73M2
ERYTHROCYTE [DISTWIDTH] IN BLOOD BY AUTOMATED COUNT: 15.6 % (ref 10–15)
GLUCOSE SERPL-MCNC: 109 MG/DL (ref 70–99)
HCO3 SERPL-SCNC: 18 MMOL/L (ref 22–29)
HCT VFR BLD AUTO: 44.6 % (ref 40–53)
HGB BLD-MCNC: 14.4 G/DL (ref 13.3–17.7)
MCH RBC QN AUTO: 27.4 PG (ref 26.5–33)
MCHC RBC AUTO-ENTMCNC: 32.3 G/DL (ref 31.5–36.5)
MCV RBC AUTO: 85 FL (ref 78–100)
PLATELET # BLD AUTO: 156 10E3/UL (ref 150–450)
POTASSIUM SERPL-SCNC: 4.6 MMOL/L (ref 3.4–5.3)
RBC # BLD AUTO: 5.25 10E6/UL (ref 4.4–5.9)
SODIUM SERPL-SCNC: 134 MMOL/L (ref 135–145)
URATE SERPL-MCNC: 6 MG/DL (ref 3.4–7)
WBC # BLD AUTO: 10.2 10E3/UL (ref 4–11)

## 2025-05-29 ENCOUNTER — LAB REQUISITION (OUTPATIENT)
Dept: LAB | Facility: CLINIC | Age: 80
End: 2025-05-29
Payer: COMMERCIAL

## 2025-05-29 DIAGNOSIS — I10 ESSENTIAL (PRIMARY) HYPERTENSION: ICD-10-CM

## 2025-05-29 DIAGNOSIS — R60.0 LOCALIZED EDEMA: ICD-10-CM

## 2025-06-03 LAB
ALBUMIN SERPL BCG-MCNC: 3.4 G/DL (ref 3.5–5.2)
ANION GAP SERPL CALCULATED.3IONS-SCNC: 8 MMOL/L (ref 7–15)
BASOPHILS # BLD AUTO: 0 10E3/UL (ref 0–0.2)
BASOPHILS NFR BLD AUTO: 0 %
BUN SERPL-MCNC: 12.2 MG/DL (ref 8–23)
CALCIUM SERPL-MCNC: 9.7 MG/DL (ref 8.8–10.4)
CHLORIDE SERPL-SCNC: 107 MMOL/L (ref 98–107)
CREAT SERPL-MCNC: 0.91 MG/DL (ref 0.67–1.17)
EGFRCR SERPLBLD CKD-EPI 2021: 85 ML/MIN/1.73M2
EOSINOPHIL # BLD AUTO: 0.2 10E3/UL (ref 0–0.7)
EOSINOPHIL NFR BLD AUTO: 2 %
ERYTHROCYTE [DISTWIDTH] IN BLOOD BY AUTOMATED COUNT: 14.8 % (ref 10–15)
ERYTHROCYTE [SEDIMENTATION RATE] IN BLOOD BY WESTERGREN METHOD: 14 MM/HR (ref 0–20)
GLUCOSE SERPL-MCNC: 138 MG/DL (ref 70–99)
HCO3 SERPL-SCNC: 23 MMOL/L (ref 22–29)
HCT VFR BLD AUTO: 41.5 % (ref 40–53)
HGB BLD-MCNC: 13.2 G/DL (ref 13.3–17.7)
IMM GRANULOCYTES # BLD: 0.1 10E3/UL
IMM GRANULOCYTES NFR BLD: 1 %
LYMPHOCYTES # BLD AUTO: 1.1 10E3/UL (ref 0.8–5.3)
LYMPHOCYTES NFR BLD AUTO: 13 %
MCH RBC QN AUTO: 27.5 PG (ref 26.5–33)
MCHC RBC AUTO-ENTMCNC: 31.8 G/DL (ref 31.5–36.5)
MCV RBC AUTO: 87 FL (ref 78–100)
MONOCYTES # BLD AUTO: 0.4 10E3/UL (ref 0–1.3)
MONOCYTES NFR BLD AUTO: 5 %
NEUTROPHILS # BLD AUTO: 6.7 10E3/UL (ref 1.6–8.3)
NEUTROPHILS NFR BLD AUTO: 79 %
NRBC # BLD AUTO: 0 10E3/UL
NRBC BLD AUTO-RTO: 0 /100
PLATELET # BLD AUTO: 166 10E3/UL (ref 150–450)
POTASSIUM SERPL-SCNC: 4.5 MMOL/L (ref 3.4–5.3)
RBC # BLD AUTO: 4.8 10E6/UL (ref 4.4–5.9)
SODIUM SERPL-SCNC: 138 MMOL/L (ref 135–145)
WBC # BLD AUTO: 8.5 10E3/UL (ref 4–11)

## 2025-06-03 PROCEDURE — 80048 BASIC METABOLIC PNL TOTAL CA: CPT | Mod: ORL | Performed by: PHYSICIAN ASSISTANT

## 2025-06-03 PROCEDURE — P9604 ONE-WAY ALLOW PRORATED TRIP: HCPCS | Mod: ORL | Performed by: PHYSICIAN ASSISTANT

## 2025-06-03 PROCEDURE — 85652 RBC SED RATE AUTOMATED: CPT | Mod: ORL | Performed by: PHYSICIAN ASSISTANT

## 2025-06-03 PROCEDURE — 85025 COMPLETE CBC W/AUTO DIFF WBC: CPT | Mod: ORL | Performed by: PHYSICIAN ASSISTANT

## 2025-06-03 PROCEDURE — 82040 ASSAY OF SERUM ALBUMIN: CPT | Mod: ORL | Performed by: PHYSICIAN ASSISTANT

## 2025-06-03 PROCEDURE — 36415 COLL VENOUS BLD VENIPUNCTURE: CPT | Mod: ORL | Performed by: PHYSICIAN ASSISTANT

## 2025-06-09 NOTE — Clinical Note
Temp pacer set to 50 52-year-old male history of dementia presents from [facility] after presumed unwitnessed fall. Overall well appearing without external signs of trauma. Will obtain CT imaging to eval for acute traumatic injury, if negative likely dc home

## 2025-06-23 ENCOUNTER — LAB REQUISITION (OUTPATIENT)
Dept: LAB | Facility: CLINIC | Age: 80
End: 2025-06-23
Payer: COMMERCIAL

## 2025-06-23 DIAGNOSIS — E78.2 MIXED HYPERLIPIDEMIA: ICD-10-CM

## 2025-06-24 LAB
CHOLEST SERPL-MCNC: 128 MG/DL
FASTING STATUS PATIENT QL REPORTED: NO
HDLC SERPL-MCNC: 40 MG/DL
LDLC SERPL CALC-MCNC: 47 MG/DL
NONHDLC SERPL-MCNC: 88 MG/DL
TRIGL SERPL-MCNC: 207 MG/DL

## (undated) DEVICE — ESU LIGASURE MARYLAND LAPAROSCOPIC SLR/DVDR 5MMX37CM LF1937

## (undated) DEVICE — CATH GUIDING 6FR AL .75 LA6AL75

## (undated) DEVICE — SHTH INTRO 0.021IN ID 6FR DIA

## (undated) DEVICE — DEVICE INFLATION SYR W/ HEMOSTASIS VALVE 12IN EXT IN4904

## (undated) DEVICE — Device

## (undated) DEVICE — CANISTER ENGINE FOR INDIGO SYSTEM

## (undated) DEVICE — KIT HAND CONTROL ACIST 014644 AR-P54

## (undated) DEVICE — CATH BALLOON EMERGE 2.0X15MM H7493918915200

## (undated) DEVICE — ENDO TROCAR OPTICAL ACCESS KII Z-THRD 05X100MM CTR03

## (undated) DEVICE — WIRE GUIDE HI-TRQ  WHISPER MS JTIP 0.014"X190CM 1005357HJ

## (undated) DEVICE — CATH ANGIO INFINITI JL4 4FRX100CM 538420

## (undated) DEVICE — ENDO TROCAR SLEEVE KII Z-THREADED 05X100MM CTS02

## (undated) DEVICE — MANIFOLD KIT ANGIO AUTOMATED 014613

## (undated) DEVICE — CATH BALLOON EMERGE 2.5X15MM H7493918915250

## (undated) DEVICE — CATH BALLOON EMERGE 3.0X15MM H7493918915300

## (undated) DEVICE — CATH DUAL ACCESS TWIN PASS 5200

## (undated) DEVICE — CATH BALLOON EMERGE 2.5X20MM H7493918920250

## (undated) DEVICE — TUBING LAP SUCT/IRRIG STRYKER 250070500

## (undated) DEVICE — GUIDEWIRE JTIP 3MM .035 180CM IQ35F180J3

## (undated) DEVICE — SYR ANGIOGRAPHY MULTIUSE KIT ACIST 014612

## (undated) DEVICE — EXCHANGE WIRE .035 260 STAR/JFC/035/260/ M001491681

## (undated) DEVICE — GOWN XXL 9575

## (undated) DEVICE — SUCTION MANIFOLD NEPTUNE 2 SYS 1 PORT 702-025-000

## (undated) DEVICE — SOL WATER IRRIG 1000ML BOTTLE 2F7114

## (undated) DEVICE — GUIDEWIRE FORTE FLOPPY J TOP 34949-05J

## (undated) DEVICE — DRSG TELFA 3X4" 1050

## (undated) DEVICE — CUSTOM PACK CORONARY SAN5BCRHEA

## (undated) DEVICE — STPL ENDO ARTICULATING 60MM EC60A

## (undated) DEVICE — SU VICRYL+ 0 27 UR6 VLT VCP603H

## (undated) DEVICE — BLADE KNIFE SURG 11 371111

## (undated) DEVICE — DRSG TEGADERM 2 3/8X2 3/4" 1624W

## (undated) DEVICE — CATH BALLOON EMERGE 3.5X15MM H7493918915350

## (undated) DEVICE — SLEEVE SHEATH STERILE 60CM 406501

## (undated) DEVICE — ELECTRODE DEFIB CADENCE 22550R

## (undated) DEVICE — TUBING PRESSURE 30"

## (undated) DEVICE — PREP CHLORAPREP 26ML TINTED HI-LITE ORANGE 930815

## (undated) DEVICE — GLOVE BIOGEL PI ULTRATOUCH G SZ 8.0 42180

## (undated) DEVICE — STPL RELOAD REG TISSUE ECHELON 60 X 3.6MM BLUE GST60B

## (undated) DEVICE — PLATE GROUNDING ADULT W/CORD 9165L

## (undated) DEVICE — PACK HEART LEFT CUSTOM

## (undated) DEVICE — CATH ANGIO INFINITI 3DRC 6FRX100CM 534676T

## (undated) DEVICE — KIT CATHETER INDIGO RX 140CM WITH LG LUMEN ASP TUBING

## (undated) DEVICE — CATH BALLOON IABP 7.5FRX50ML INTRA-AORTIC 0684-00-0576-01U

## (undated) DEVICE — CATH ANGIO INFINITI 3DRC 4FRX100CM 538476

## (undated) DEVICE — ENDO POUCH UNIV RETRIEVAL SYSTEM INZII 10MM CD001

## (undated) DEVICE — SYSTEM LAPAROVUE VISIBILITY LAPVUE10

## (undated) DEVICE — INTRO SHEATH 6FRX10CM PINNACLE RSS602

## (undated) DEVICE — INTRO SHEATH 6FRX25CM PINNACLE RSS606

## (undated) DEVICE — SU MONOCRYL+ 4-0 18IN PS2 UND MCP496G

## (undated) DEVICE — CATH GUIDELINER 6FR 5571

## (undated) DEVICE — ENDO SHEARS RENEW LAP ENDOCUT SCISSOR TIP 16.5MM 3142

## (undated) DEVICE — DECANTER VIAL 2006S

## (undated) DEVICE — CUSTOM PACK LAP CHOLE SBA5BLCHEA

## (undated) DEVICE — CATH BALLOON NC EMERGE 3.50X15MM H7493926715350

## (undated) DEVICE — KIT HAND CONTROL ACIST 016795

## (undated) DEVICE — BASIN EMESIS STERILE  SSK9005A

## (undated) DEVICE — TUBING SMOKE EVAC PNEUMOCLEAR HIGH FLOW 0620050250

## (undated) DEVICE — ENDO TROCAR FIRST ENTRY KII FIOS Z-THRD 12X100MM CTF73

## (undated) DEVICE — DEVICE STATLOCK INTRA-AORTIC BALL PUMP 0684-00-0472

## (undated) DEVICE — CATH BALLOON NC EMERGE 4.50X12MM H7493926712450

## (undated) RX ORDER — LIDOCAINE HYDROCHLORIDE 10 MG/ML
INJECTION, SOLUTION EPIDURAL; INFILTRATION; INTRACAUDAL; PERINEURAL
Status: DISPENSED
Start: 2023-06-11

## (undated) RX ORDER — EPTIFIBATIDE 2 MG/ML
INJECTION, SOLUTION INTRAVENOUS
Status: DISPENSED
Start: 2023-06-11

## (undated) RX ORDER — PROPOFOL 10 MG/ML
INJECTION, EMULSION INTRAVENOUS
Status: DISPENSED
Start: 2022-09-02

## (undated) RX ORDER — ONDANSETRON 2 MG/ML
INJECTION INTRAMUSCULAR; INTRAVENOUS
Status: DISPENSED
Start: 2023-06-11

## (undated) RX ORDER — HEPARIN SODIUM 1000 [USP'U]/ML
INJECTION, SOLUTION INTRAVENOUS; SUBCUTANEOUS
Status: DISPENSED
Start: 2023-06-11

## (undated) RX ORDER — ONDANSETRON 2 MG/ML
INJECTION INTRAMUSCULAR; INTRAVENOUS
Status: DISPENSED
Start: 2022-09-02

## (undated) RX ORDER — FENTANYL CITRATE 50 UG/ML
INJECTION, SOLUTION INTRAMUSCULAR; INTRAVENOUS
Status: DISPENSED
Start: 2022-09-02

## (undated) RX ORDER — LIDOCAINE HYDROCHLORIDE 10 MG/ML
INJECTION, SOLUTION EPIDURAL; INFILTRATION; INTRACAUDAL; PERINEURAL
Status: DISPENSED
Start: 2022-09-02

## (undated) RX ORDER — FENTANYL CITRATE 50 UG/ML
INJECTION, SOLUTION INTRAMUSCULAR; INTRAVENOUS
Status: DISPENSED
Start: 2023-06-11

## (undated) RX ORDER — BUPIVACAINE HYDROCHLORIDE AND EPINEPHRINE 2.5; 5 MG/ML; UG/ML
INJECTION, SOLUTION INFILTRATION; PERINEURAL
Status: DISPENSED
Start: 2022-09-02

## (undated) RX ORDER — FENTANYL CITRATE-0.9 % NACL/PF 10 MCG/ML
PLASTIC BAG, INJECTION (ML) INTRAVENOUS
Status: DISPENSED
Start: 2022-09-02